# Patient Record
(demographics unavailable — no encounter records)

---

## 2024-10-10 NOTE — CONSULT LETTER
[Dear  ___] : Dear  [unfilled], [Courtesy Letter:] : I had the pleasure of seeing your patient, [unfilled], in my office today. [Please see my note below.] : Please see my note below. [Consult Closing:] : Thank you very much for allowing me to participate in the care of this patient.  If you have any questions, please do not hesitate to contact me. [Sincerely,] : Sincerely, [DrGuanakito  ___] : Dr. SCOTT

## 2024-10-16 NOTE — PHYSICAL EXAM
[Fully active, able to carry on all pre-disease performance without restriction] : Status 0 - Fully active, able to carry on all pre-disease performance without restriction [Normal] : affect appropriate [de-identified] : anicteric  [de-identified] : no LE edema

## 2024-10-16 NOTE — PHYSICAL EXAM
[Fully active, able to carry on all pre-disease performance without restriction] : Status 0 - Fully active, able to carry on all pre-disease performance without restriction [Normal] : affect appropriate [de-identified] : anicteric  [de-identified] : no LE edema

## 2024-10-16 NOTE — REVIEW OF SYSTEMS
[Fatigue] : fatigue [Joint Pain] : joint pain [Hot Flashes] : hot flashes [Fever] : no fever [Chills] : no chills [Night Sweats] : no night sweats [Chest Pain] : no chest pain [Palpitations] : no palpitations [Lower Ext Edema] : no lower extremity edema [Shortness Of Breath] : no shortness of breath [Cough] : no cough [Abdominal Pain] : no abdominal pain [Vomiting] : no vomiting [Constipation] : no constipation [Dysuria] : no dysuria [Incontinence] : no incontinence [Joint Stiffness] : no joint stiffness [Muscle Pain] : no muscle pain [Muscle Weakness] : no muscle weakness [Dizziness] : no dizziness [Easy Bleeding] : no tendency for easy bleeding [Easy Bruising] : no tendency for easy bruising [de-identified] : occasional

## 2024-10-16 NOTE — ASSESSMENT
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 He started abiraterone + prednisone in June 2020, dose was reduced to 500mg d/t transaminitis. Abiraterone discontinued late December 2023 for disease progression. Xtandi started Jan 2024.   mCRPC: - PSA prior to start of Xtandi was 3.6 on 1/2/24, 5.17 on 2/14/24, 5.6 on 3/14/24, 6.8 on 4/16/24, 9.68 on 5/15/24, 8.13 on 6/12/24, 9.39 on 7/11/24, 10.8 on 8/14/24, 12.3 on 9/17/24.  - Repeat PSA today is pending. CT scans at end of May and bone scan in late June were stable. To consider repeat scans if PSA continues to rise.  - 5/30/24 CT chest/abd/pelvis shows stable disease, appearance of bone is grossly unchanged. 6/25/24 bone scan shows significant improve in skeletal lesions when compared to the prior Dec 2023 scan.  - Continue Xtandi 80mg daily (dose reduced d/t poor appetite and weight loss), tolerating better at a decreased dose.  - Feb 2024 Foundation Liquid CDx was negative for any actionable mutations, MSI high not detected, TMB 8 muts/Mb, ctDNA <1%.  - Continue Eligard q6mo inj with urology, given 6/10/24, next scheduled for 12/12/24.   Bones: - He is off Ca + D supplementation d/t hypercalcemia.  - Monthly Xgeva inj started 5/15/24, given today   Diarrhea: - Occasional diarrhea is improved, only 2 episodes over the past month, monitor closely.  - Continue PRN Imodium per package insert. Instructed to contact the office if diarrhea is more persistent.   CKD: - Baseline Cr has been 1.52 - 1.87 over the past year - 8/14/24 Cr = 1.5 - 9/17/24 Cr = 1.5 - Repeat CMP today is pending.  - Continue follow up with nephrology.  Instructed to contact our office with any new/worsening symptoms. Pt and family member educated regarding plan of care, all questions/concerns addressed to the best of my abilities and their apparent satisfaction. F/u in 1 month for provider visit + Xgeva inj, transfer care to Dr. Moody as Dr. Basurto is leaving [facility].  [AdvancecareDate] : 1/2/24 [FreeTextEntry2] : Health care proxy form provided to pt 12/5/23, he still has at home. Instructed once again to bring the completed form to his next office visit.

## 2024-10-16 NOTE — HISTORY OF PRESENT ILLNESS
[Disease: _____________________] : Disease: [unfilled] [T: ___] : T[unfilled] [N: ___] : N[unfilled] [M: ___] : M[unfilled] [de-identified] : This 66-year-old male was first seen in consultation on July 20, 2016. In August of 2013 his PSA was 4.3. In September 30, 2013 it was 4.9. He underwent a biopsy in March 5, 2014 revealing Ade 7 (3+4) disease. He underwent external beam radiation therapy for a total dose of 8100 cGy in 45 fractions from May 25 and total July 31 of 2014. He subsequently experienced PSA failure and brachytherapy was considered. A CT scan was performed as part of that evaluation revealing a filling defect in the left ureter. In March 2016, he presented to the emergency room with a creatinine of 13.7 and potassium of 8.9. Bilateral nephrostomy tubes were placed. He was found to have GI bleeding and a colonoscopy revealed evidence of radiation proctitis. He has been on Lupron. 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 His initial staging was stage II, [id number].    The original pathology was reviewed prior to radiation. The left base revealed adenocarcinoma the prostate, Ade score 7 (3+4) involving 2 out of 2 cores, 40% of each core, with perineural invasion. The left mid zone revealed adenocarcinoma the prostate, Ade score 7 and (4+3) involving 50% of each of 2 cores, with perineural invasion noted. The left apex had 3 of 3 cores involved. One core was Ade 7 (3+4) involving 100% of the core. The other 2 cores had a Ade 6 (3+3) in less than 5% of 2 cores. Perineural invasion was identified as well.  Posttreatment biopsy revealed the presence of adenocarcinoma within the prostate gland. The PSA was 11 at the time of the biopsy.  Feels well at initial consultation. Started Lupron in January 2016 before the renal failure/obstruction. No pains. Fatigue at times. Appetite good, stable weight. Lost weight with illness in March, typical weight prior was 210, left hospital at 170. Weight stable. He was transfused multiple times. Some urine through penis recently. Has bilateral percutaneous nephrostomies. No blood, dysuria, no incontinence. Nocturia x 2-3 at this time. Right sided nephrostomy still has output, small amount in left bag. Due for change of tubes in September. Had radiation proctitis and has laser treatment. Sees GI in follow-up next month.   10/18/16...Had a colonoscopy, showed radiation colitis. No further rectal blood noted. Urine flow mostly from PCNs, some from penis. No dysuria, some  hematuria noted at times. Appetite is normal. No weight loss. No fatigue. Hot flushes occur daily. .  4/18/17...last scans 9/26.  Saw PCP and had PSA done, shows slight increase over manjinder. he was given ferrous sulfate to take for anemia. No pains. Urine flow is mostly thru PCNs. Still has occasional blood in the urine, noted in the bag. Has less volume form the left side.  Hot flushes occur a few a day, 15-20 minutes, occ sweats. Appetite is good, no weight loss. Some fatigue noted. No edema. Occ blood with stools, saw Dr Grossman in September, due for follow up. has not had major bleeding recently.   8/1/17...Surya states that he is feeling good, his urine flow is strong, slight increase, no pains anywhere, he gets hot flashes a "few  day, they are coming." He has some minor fatigue, He has percutaneous nephrostomies, He follows with Dr. Dick, no obvious hematuria, no breast tenderness.  11/14/17...Received Lupron from Dr Dick a few weeks ago. PSA was 0.02 on 8/1/17. He gets hot flashes a couple times a day, He has b/l nephrostomies, is able to pass urine through his penis. He denies hematuria, dysuria He states appetite is good. He gets some fatigue, He denies any pains.  4/17/18...Received Lupron from Dr Dick Jan 23, 2018. Not seen since November 17, due to illness. 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 Feels well. No pains noted. Some fatigue. Appetite is good. Reports that he has diarrheal for 2-3 months, not daily. He had loose stools this AM, took Imodium. Diarrhea occurs 1-2 days a week, the n stops. Some flatus, no cramps. No blood in the stool. Urine flow is via nephrostomy, minor amount thru penis, no dysuria, no incontinence. No blood in urine. Hot flushes most days.    Minor fatigue. Appetite normal.   7/17/18...On CAB. Feels "okay". No pains. Has bilateral nephrostomy tubes, due for change in August. No recent infections, occasionally passes urine via penis, no blood, no dysuria. Still has hot flushes, daily, more in the winter. Some fatigue, no physical impairment. Appetite is good. Weight stable. Diarrhea persists, on and off, not daily. Watery at times. takes Imodium on occasion. Occ small amount of blood with hard stools, secondary to radiation proctitis.   10/23/18...Feels "okay". No pains. Urine flow is minimal, most comes out of the nephrostomy tubes, nocturia -none. No blood, no dysuria. No incontinence. Hot flushes occur, day and night. Appetite is good, no weight. No dyspepsia, no nausea, no vomiting. Diarrheal stools occur episodically, every 2-3 weeks. No blood in the stool. Usually 1-2 BMs per day. Occasionally notes blood on toiler paper with hard stool. has RT proctitis.   2/5/19...Feels well, no pains. has hot flushes frequently, daily. Has some sweats with them at night. Appetite is good, no weight loss. Urine flow is minimal thru penis, nocturia does not occur. Most urine form PCNs, last catheter change January 2019, by IR at [facility]. Occ minor blood in the urine, when the catheter is loose. No back pains, no bone pains. Occ fatigue.   5/7/19...Had a calcium of 11.1 last visit. Called him and told to stop calcium and vit D. Repeat 3 weeks later was normal. Continues on Lupron and Casodex. Feels well. Hot flushes every day. He has some fatigue, unchanged. Appetite is good, no weight loss. Urine is minimal from penis, has bilateral PCNs. No bone pains. No edema.   8/8/19...Feels "all right", as a URTI. No pains noted. Hot flushes multiple time a day. Fatigue is present, mild. Urine flow is 'good', PCN bilateral, most flow form the right. Some flow thru penis, voids 1-2 times a day. NO blood, no dysuria. Due for PCN change next week. Appetite is good, no weight change.  10/29/19...Local failure prostate cancer, on CAB. Feels well. No pains., Urine flow is good. Has bilateral PCNs. Most of urine goes to PCNs. Occ small amount of blood in the bag. Voids 2 times a day. Hot flushes, daily, with sweats on occasion. has fatigue at times. Appetite is good, no weight loss. NO leg edema. No bleeding from the RT proctitis recently  1/28/20...Stopped bicalutamide after last visit. Last seen 3 months ago. PSA on 1/7 was 0.27, thus continued to rise somewhat. NO bone pains. Hot flushes daily, occ sweats. Appetite is good, no weight loss. No fatigue of note. Urine flow is good, but most flow is into the PCNs. Last change of PCNs was December after he had some leaking on one side. No hematuria, no dysuria. No incontinence.   5/6/20...Verbal permission granted for telephone services by patient, Surya Cervantes, on 5/6/20 at 1:35 PM.   Fells well. No pains noted. Nephrostomy tubes in place, replaced last week. Appetite is good, no weight loss. Hot flushes daily. He has chronic fatigue complaints. No leg edema noted. Urine through penis is minimal, only when tubes become clogged. No blood in the urine. No nausea, no vomiting. Has diarrhea at times, once every 2-3 weeks. No back pains noted. No fevers, no chills., No cough, no sputum.   6/17/20...Verbal permission for telephone contact was granted by the patient, Surya Cervantes, on June 17, at 4 PM. Feels "all right". NO pains. Hot flushes, daily, occ minor sweats. Fatigue is present, rated mild, occasional naps. Appetite is good, no weight loss. No edema. Urine flow is minimal from penis, has bilateral PCNs. No blood. No incontinence. No recent infections. No cough NO CHEN. No F/C. Helps in care of his mother, watches TV, not very active. Jeana from [employer].   7/29/20...Verbal permission for telephone contact was granted by the patient, Surya Cervantes, on July 29, 2020 at 3:10 PM.  Started tawanda/pred on June 16, 2020. Feels  "pretty good". He was having diarrhea prior and he no longer had diarrhea.   No pains at this time. No edema. No headaches. Checks BP on occasion.  BPs have been "normal", he will check often. Urine flow "about the same",. Most urine comes from PCNs. No nocturia, no  hematuria, no dysuria. Appetite is normal, thinks he may have lost a few pounds. No cough, No CHEN. NO F/C. Hot flushes occur daily. No change. Mild fatigue.  8/26/20...On abiraterone and prednisone since June, PSA dropped significantly. taking meds correctly. Feels 'all right", no pains. Fatigue is present, as before, "not terrible", takes naps. Slightly more fatigue than before. Appetite is good, but lost weight. eats with his mother, she eats less and so does he. No nausea, no vomiting. No headaches, no edema of note. Most of urine comes from PCNs, Urine from penis is minimal, does not get up at night. No blood, no dysuria. No incontinence. No blood in the PCN bags.  Has leg cramps daily, particularly in the AM. Dr Angulo administers Lupron, due for Rx on 9/1/20.   9/22/20...He is seen in the office today in follow-up. He's been on abiraterone and prednisone since June. He had elevated transaminases on September 3 and his abiraterone was subsequently held. He will have repeat blood work done today after today's visit. He states that he feels "all right." His appetite is stated to be "all right." His weight has been stable. He denies dyspepsia, nausea or vomiting. He denies constipation or diarrheal stools. There is no cough or shortness of breath. He denies skin rashes or pruritus. There are no complaints of pains at this time. Fatigue is present but it does not impair his daily activities. He takes occasional naps during the day. He denies arthralgias or myalgias. There are no headaches or dizziness. He has bilateral percutaneous nephrostomy tubes draining yellow urine. There is no blood in the percutaneous nephrostomy bags. Most of the urine comes from the percutaneous nephrostomy tubes with minimal amount of urine coming out through the penis. There is no nocturia. There is no hematuria or dysuria. There is no incontinence. There is no edema in the extremities. He has occasional hot flashes but felt it has improved these past few days. He denies fevers or chills. He infrequently checks his blood pressure at home. He would get blood pressure readings ranging from 150s-160s systolic over 80s-90s diastolic. He remains independent in his activities of daily living.   10/27/20...Verbal permission for telephone services granted by the patient,  Surya Cervantes on October 27, 2020 at 3:48 PM.  Stared abiraterone in June. Feels "pretty good". NO increased edema. No headaches. Hot flushes are less. Appetite is good, no weight loss. Has PCNs, has urine form the penis at times. NO blood in the urine. No dysuria. No nocturia. No incontinence. No bone pains noted. Fatigue  RTs, takes a nap at times. Cares for his elderly mother at home.   11/25/20...Verbal permission for telephone services granted by the patient,  Surya Cervantes on November 25, 2020 at 1:50 PM On 1/2 dose tawanda due to transaminases. No pains, feels "all right". Some fatigue. Urine flow form penis in minimal, has bilateral PCNs. Occasional small amount of blood in the right PCN tube. Appetite is good, no weight loss. No cough, no CHEN. Hot flushes daily. No headaches, no edema. BP monitored at home, 151/89, pulse 95.   12/23/20...Verbal permission for telephone services granted by the patient,  Surya Cervantes on December 23, 2020 at  1:32 PM.  Feels well. No pains noted. Urine flow RTS. Has PCNs, minimal urine via penis on occasion. No blood in urine or tubes of note. Appetite is good, no weight loss. NO edema of the legs. NO headaches, no dizziness. No cough, no CHEN. No F/C. Hot flushes occur daily, less often. Due Lupron next month from Dr Angulo. Mild fatigue, take a nap. Independent in ADLs, cares for his mother. BP at home 138/90, pulse 71. Left arm 147/92  1/28/21...Verbal permission for telephone services granted by the patient,  Surya Cervantes on January 28, 2021 at 315 PM.  Surya reports that he has no pains.  There is no significant urine within his blood.  In the interim, he had to have the left percutaneous nephrostomy tube changed as there was pus present.  They had some difficulties apparently.  The tube was clogged and he was unable to change it over a guidewire.  He was not treated with any antibiotics.  He does have some urine from the penis at times.  There is no fevers or chills.  His appetite is good and there is no weight loss.  He reports that his weight is 190 pounds today.  He says his blood pressure was 146/92 in the left arm and 145/85 on the right arm.  The pulse rate was 75.  He reports no edema or headaches.  There are some hot flushes.  2/25/21...Verbal permission for telephone services granted by the patient,  Surya Cervantes on 2/25/21 at 3:10 PM Feels "all right". NO pains. He was having issues with PCNs clogged, had to have removal and reinsertion. It was the right side this week, done on Tuesday. Told to flush it daily rather than every 3 days. Appetite is good, no weight loss, no edema. No cough, no CHEN. BP is monitored, right side 139/84 today, left 134/82, pulse 79. No headaches, no significant fatigue, does nap on occasion. NO leg weakness. No N/V/D/C. Minimal urine from penis. There was more flow when the dysfunction occurred. NO blood in the urine, no dysuria.   4/7/21...  10/26/21....Last evaluation was 4/7/21, by telephone. Completed antibiotics. Was in rehab for 6 weeks. Strength is good, no pains. Appetite is good, gained some weight. urine flow is good, no incontinence, very little urine from penis, mostly from the tubes. No headaches, no dizziness, no balance issues, No falls. Hot flushes occur. Lives with mother, helps her. No edema at this time.   From neurosurgery, this summary of events....70 yo Male w/ PMHx of HTN, prostate CA (on chemo, s/p radiation), b/l nephrostomy tubes, presented  to [facility] ED on 6/14/21 with weakness and confusion. In ED foulurine in b/l nephrostomy bags noted, tachycardic, and hypotensive. s/p IVF and levo drip for septic shock, developed fluid overload with pitting edema and decreased  EF. Imaging of the spine concerning for discitis and osteomyelitis c-spine with T1 inflammation/signal changes.   Hospital coursed remarkable for BC + Klebsiella and strep anguinous. Started on vanco/zosyn 6/14. As per ID switched to ceftriaxone 2 g on 6/17. Hospitalization c/b ASHLEY on CKD with right hydronephrosis. IR consulted- no intervention, both nephrostomy tubes flushing adequately. Nephrology  consulted, started on stress dose steroids/hydrocortisone 50 mg q6 and Midodrine. CTAP showed Sludge in gallbladder.  Patient  underwent C6-7 corpectomy and posterior fusion C4-T2 on 6/29/21. Hospital course c/b tachycardia, fever with low O2 sats. Chest CT negative for  PE, lower extremity Dopplers negative. s/p PICC, discharged to inpatient rehab on 7/12/21 and then was discharged home after completion of rehab.   Last Eligard was August with Dr Angulo, 30 mg dose.   11/30/21...on tawanda/pred since June 2020. Feels  "all right". Has hot flushes and fatigue. Does not prevent activities. He falls asleep easily if he sits in a chair. Independent in ADLs, cares for his mother. No infections recently, No F/C. No recent adventures with with his PCNs. No blood in urine, occ urine via penis. Appetite is good, gained 2.2 kilos. Edema decreased. No chest pain/pressure. No cough/ CHEN. No headaches, no dizziness. No N/V/D/C.   1/11/22....telehealth today. Feels  "all right". No pains of note. t flushes daily. Has some fatigue as well. No edema noted. NO cough/CEHN No chest pain/pressure. No F/C. Appetite is good, no weight loss. Weighs 176. Checks BP at home, 142/98 RP 72 today, L arm, 142/91. Takes terazosin at night. Sees PCP in March. Needs to call PCP for tighter BP control. NO headaches, no dizziness. No F/C. No hematuria, has bilateral PCNS, has some small amount of urine from the penis. Had change of PCNs last week. Independent in ADLs.   2/8/22 - telehealth visit today. Continues on abiraterone 500mg daily + prednisone. Reports his BP today was 143/94 with HR of 74. Overall feeling "alright". Moderate fatigue, takes naps during the day. Occasional hot flashes. Appetite is good, weight today is 180lbs, he attributes recent weight gain to eating better after being discharged from the hospital. Mild ptosis of left eyelid comes and goes and has reportedly been stable for his "whole life". Bilat percutaneous nephrostomy tubes in place, no hematuria. Ongoing mild BLE edema is reportedly stable. Ongoing intermittent arthritis pains of left knee and right shoulder are stable. Denies fever, chills, headache, dizziness, balance issues, eye pain/problems, mucositis/odynophagia, chest pain, palpitations, SOB, cough, nausea/vomiting, diarrhea/constipation, abdominal pain, hematuria, rash/pruritus, neuropathy, bleeding, weakness, anxiety/depression.  3/17/22...tawanda/pred started mid June 2020. PSA was 11.6 at start. Feels "all right". No pains. No major issues with the PCNs, having a change done next week. Appetite is good, no weight loss. No cough, No CHEN. Some leg edema. No chest pain/pressure. Urine from penis is relatively little. No dysuria, no blood. has calcium oxalate excretion, which causes his tubes to be clogged. Notes some bruising. NO headaches, no dizziness, no balance issues. No F/C. No N/V/C, some minor diarrheal stools at times. Takes Imodium as as needed. Fatigue is present at times, has hot flushes occur multiple times a day.   4/20/22 - continues on abiraterone (500mg daily) + prednisone since June 2020 for mCRPC. Overall feeling "alright", ongoing mild fatigue is stable. Intermittent hot flashes are tolerable. Mild night sweats during the summer when it gets warmer. Ongoing mildly productive cough which he's had for "a long time". Occasional diarrhea that doesn't last more than a day. Bilateral percutaneous nephrostomy tubes in place, passing minimal urine from his penis. Occasional hematuria especially from right nephrostomy bag, clear today. Ongoing mild BLE edema. Ongoing chronic left knee swelling and pain 2/2 arthritis. Ongoing right shoulder pain 2/2 arthritis as well. Denies fever, chills, headache, dizziness, balance issues, eye pain/problems, mucositis/odynophagia, chest pain, palpitations, SOB, nausea/vomiting, constipation, abdominal pain, rash/pruritus, bleeding, muscle pain/weakness  5/24/22 - continues on abiraterone (500mg daily) + prednisone since June 2020 for mCRPC. BP today is 164/95, asymptomatic. BP at home has been 150s/80-90s. On occasion the DBP has been >100. Overall feeling "alright", ongoing fatigue is stable. Ongoing intermittent hot flashes. Appetite is good. Vision is changing in his left eye, he has an appt with [employer] this Thurs. Bilateral nephrostomy tubes in place, occasional hematuria at times resolves after 2-3 days. Ongoing trace edema of BLEs. Intermittent pruritus around nephrostomy tube dressings. Denies fever, chills, night sweats, headache, dizziness, balance issues, mucositis/odynophagia, chest pain, palpitations, SOB, cough, nausea/vomiting, diarrhea/constipation, abdominal pain, dysuria, incontinence,  rash, neuropathy, bleeding, muscle or joint pain/weakness.   6/21/22 - continues on abiraterone, half dose + prednisone since June 2020 for mCRPC. PSA 11.6 at start of treatment in June 2020, manjinder PSA at o.o5 August 2021, slow rise, recent PSA in May 2022 was 0.17.  feels  "all right". No pains noted. Has bilateral PCNs, changed last week. No infections, no F/C. Some fatigue, always, no worse. Appetite is good, no weight loss. NO headaches, no dizziness, no balance issues. Some edema noted, as before. No chest pain/pressure. NO bone pains. No N/V/D/C. Small amounts of urine via the penis. Hot flushes daily, multiple times. No cough, no CHEN.   7/28/22 - continues on abiraterone 500mg + prednisone since June 2020 for mCRPC. Overall feeling "alright", ongoing mild fatigue for which he occasionally takes a nap once a day. Ongoing hot flashes are tolerable. Appetite is good. Occasional cough. Bilateral percutaneous nephrostomy tubes in place passing most of the urine through the bags but still passing minimal urine via penis, no nocturia. Trace edema of BLEs. Notes intermittent cramping of hands which may be arthritis as it improves with movement. Has ongoing intermittent right buttock pain radiating down to the knee, present for the past year, pain is worse with sitting for a long time, improves with changing positions, max pain is 3/10 not requiring medication. Denies fever, chills, night sweats, headache, dizziness, balance issues, eye pain/problems, mucositis/odynophagia, chest pain, palpitations, SOB, nausea/vomiting, diarrhea/constipation, abdominal pain, dysuria, hematuria, incontinence, rash/pruritus, bleeding, weakness.   10/11/22 - continues on abiraterone 500mg daily + prednisone since June 2020 for mCRPC. Neurosurgical notes reviewed...."S/P cervical spinal fusion, 70 year old male 15 months pos op C6-7 corpectomy and posterior fusion C4-T2 on 6/29/21 with Dr. Tiffanie Martinez". Now seeing Dr Resendiz. Overall, feels "all right". No pains noted. urine flow is "fine", no incontinence, most urine still out PCNs, some from the penis. No blood, no dysuria. Hot flushes daily.  Some fatigue, RTS. Appetite is good, no weight loss. No headaches, dizziness, no balance issues, No paresthesias. Occ cough, sputum is white. No CHEN. Tubes to be changed next week. No N/V/D/C.  Some edema , improved. No chest pain/pressure  11/9/22 - continues on abiraterone 500mg daily + prednisone since June 2020 for mCRPC which he is tolerating well. Pt reports feeling generally well. No new complaints. Bilateral nephrostomy tubes in place, denies hematuria . Ongoing trace edema of BLEs. Intermittent pruritus around nephrostomy tube dressings. Denies fever, chills, night sweats, headache, dizziness, balance issues, mucositis/odynophagia, chest pain, palpitations, SOB, cough, nausea/vomiting, diarrhea/constipation, abdominal pain, dysuria, incontinence, rash, neuropathy, bleeding, muscle or joint pain/weakness. Appetite reported as good. No changes in medications  12/7/22 - continues on abiraterone 500mg daily + prednisone since June 2020 for mCRPC.  gets his Eligard 45 tomorrow form Dr Angulo. feels "all right". No pains noted except for arthrtic complaints. Appetite is okay< dropped a few pounds. usual edema, no change. No headaches, NO dizziness. Most of the urine comes from PCNs, changed every 6 weeks due to prior infections. NO fevers, no chills. Hot flushes occur, 1-2 times a day. Notes a bit more urine thru penis lately. Occ cough, no CHEN. No chest pain/pressure/palpitations. NO N/V/D/C. Mild fatigue. Independent in ADLs.   1/4/23 - continues on abiraterone 500mg daily + prednisone since June 2020 for mCRPC. Overall feeling "alright", ongoing fatigue is stable. Ongoing hot flashes are tolerable. Appetite is "ok". Ongoing occasional productive cough is stable. Bilateral perc nephrostomy tubes in place, continues to pass some urine through the penis, no nocturia. Denies fever, chills, night sweats,headache, dizziness, balance issues, eye pain/problems, mucositis/odynophagia, chest pain, palpitations, SOB, nausea/vomiting, diarrhea/constipation, abdominal pain, dysuria, hematuria, incontinence, LE edema, rash/pruritus, bleeding, muscle or joint pain/weakness.   2/13/23..... continues on abiraterone 500mg daily + prednisone since June 2020 for mCRPC. treated with cephalexin and developed rash, red skin, pruritus. Chart labeled as allergy. Hospitalization was brief. sudden onset of symptoms, Hd diarrheal stools as well. Feels  "pretty good", except for residual pruritus. No pains noted.  Has tube change set for next week. Appetite has retuned. No N/V. Diarrhea resolved, NO taste alteration. No headaches, no dizziness. had some edema. resolved. No chest pain/pressure/palpations.  has his usual fatigue and hot flushes. Cough, chronic, with clear sputum. No CHEN/SOB. No fevers of chills since discharge  Hospital Course:  Discharge Date	01-Feb-2023  Admission Date	29-Jan-2023 17:52  Reason for Admission	Syncope  Hospital Course	  69 yo M w/ HTN, gout, prostate cancer s/p b/l nephrostomy tubes, on abiraterone, admitted w/ sepsis 2/2 UTI, improved on antibiotics.  Sepsis secondary to UTI.  was febrile, tachycardic in the ED, met criteria for sepsis on admission  - U/A w/ +nitrite, large LE, prior cultures with klebsiella and E. coli  - current Ucx >3 organisms suggestive of collection contamination  - received IV zosyn (1/29/23-2/1/23),  Bcx negative, discharge on oral cephalexin to complete 10 days for complicated UTI (end date 2/7/23)  - sepsis resolved (currently afebrile, without leukocytosis, tachycardia resolved)  - IR consulted nephrostomy tubes in position and draining well, no indication for exchange at this time.   Gastroenteritis.   had reported nausea/vomiting/diarrhea after eating fried rice w/ beef  - s/p IV fluids, now resolved.   Syncope.   reports syncopal episode likely 2/2 dehydration from gastroenteritis  - s/p IV fluids, EKG sinus rhythm, troponin indeterminate but no increased  - reports symptoms resolved, ambulating around unit independently (advised call don't fall).   3/21/23..... continues on abiraterone 500mg daily + prednisone since June 2020 for mCRPC. treated with cephalexin and developed rash, red skin, pruritus. Chart labeled as allergy. Overall, he feels "all right". No pains of note. Appetite is good, no weight loss. No edema. No chest pain/pressure/palpitations. some cough, no CHEN. Hot flushes daily. Fatigue is present.  Does yoga, runs in place. No HA, no dizziness, no falls. Independent in ADLs. no fevers, no chills. Minimal urine from penis, no dysuria. PCNs are changed every 6 weeks, no blood in tubes. No N/V/D/C.   4/17/23.....  on abiraterone 500mg and prednisone since June 2020 for mCRPC. Prior transaminitis led to decrease in dose. "doing okay". Minor discomfort on left side near the PCN. Has change every 6 weeks at this time. Appetite is good, no weight loss. No N/V/D/C. Has some urine form the penis, noted if PCNs pinch. No blood in the urine, no hematuria. Hot flushes as before, about 3 times a day.  fatigue RTS. No cough, no CHEN, No fevers, no chills. No edema, no chest pains,pressure/palpitations  Exercises every AM.   5/16/23....continues on abiraterone 500mg and prednisone since June 2020 for mCRPC. Prior transaminitis led to decrease in dose. Eligard next month with Dr Angulo. Overall, feels "all right". No pains noted. Appetite is good, no weight loss. No N/V/D/C. Urine from penis on occasion, empties bladder before bed. No hematuria, no dysuria. No fevers, no chills. PCNs were changed last week. No headaches, no dizziness, no balance issues.  Exercises daily, walking and running. Occ cough, no CHEN. No chest pain/pressure/palpitations. Hot flushes remains. No fevers, no chills  6/12/23.... on abiraterone 500mg and prednisone since June 2020 for mCRPC. Prior transaminitis led to decrease in dose. Mara with Dr Angulo. Feels "well". NO pains, except for radicular pain in right thigh, present for about 6 weeks, constant. uses a topical, icy-hot. Does not awaken him, better when he is lying down, aggravated by ambulation. Appetite is normal. No weight loss. NO fevers, no chills. NO fatigue. No headaches, no dizziness, no paresthesias. No cane. No falls. No cough, no CHEN. NO N/V/D/C. Urine from PCNs. Some from the penis, no hematuria, no dysuria. No nocturia. Hot flushes daily. No edema. NO chest pain/pressure/palpitations. No back pains. Independent in ADLs  7/18/23 - continues on abiraterone + prednisone (500mg daily d/t transaminitis) for mCRPC since June 2020. Ongoing fatigue after periods activity, naps once a day. Ongoing hot flashes are tolerable. Appetite is good. Occasional cough. Bilateral perc nephrostomy tubes in place, scant blood if the tubing moves around but not persistent. Ongoing pain in right buttock radiating down posterior thigh/leg, no pain with lying down, pain is worse with sitting for a prolonged time and improves with walking, max pain is 8/10. Not taking any pain medication as the pain eases up when he gets up and walks around.   8/16/23 - continues on abiraterone + prednisone (500mg daily d/t transaminitis) for mCRPC since June 2020. Overall feeling "alright", ongoing fatigue is stable. Ongoing frequent hot flashes, tolerable. Appetite is "alright". Ongoing occasional cough. Perc nephrostomy tubes exchanged 2wks ago, occasional transient hematuria which he attributes to accidentally pulling on the tubes. Ongoing stable intermittent right buttock pain radiating down posterior thigh down to the calf, improves with movement and worse with sitting for a long time, max pain is 5/10. Using a topical cream called "[place] Dream" which has been helpful.   9/20/23 - continues on abiraterone + prednisone (500mg daily d/t transaminitis) for mCRPC since June 2020. last PCN change was last week. On a 6 week change protocol. Feels "not bad", chronic leg pains, form right buttock, down the leg. Passing urine via penis, very small amounts. No blood, no dysuria. No nocturia. No incontinence, voids about 2 times a day. Hot flushes, every day, no sweats. Fatigue is present, naps, feels refreshed afterwards. Does yoga, stretching, walking, lifts some weights, daily basis. Appetite is good, no weight Kenji Occ cough, no CHEN. No edema, no chest pain/pressure.   10/31/23 - continues on abiraterone + prednisone (500mg daily d/t transaminitis) for mCRPC since June 2020. Overall feeling "alright", ongoing fatigue is stable. Occasional hot flashes. Appetite is "alright". Perc nephrostomy tubes in place, passing minimal urine through his penis. Ongoing pain in right buttock that radiates down the posterior leg, worse in the morning when first waking up and worse with sitting for a prolonged period, improves/resolves with activity. Denies fever, chills, night sweats, headache, dizziness, balance issues, chest pain, palpitations, SOB, cough, nausea/vomiting, diarrhea/constipation, abdominal pain, dysuria, hematuria, incontinence, LE edema, bleeding.   12/5/23 - continues on abiraterone + prednisone (500mg daily d/t transaminitis) for mCRPC since June 2020. Overall feeling well, some fatigue but remains active and exercising regularly. Ongoing hot flashes are tolerable. Appetite is good. Bilateral percutaneous nephrostomy tubes in place, no hematuria. Ongoing right buttock pain that radiates down the posterior leg, worse in the morning and improves with activity, max pain is 4-5/10, declines referral to PM&R. Denies fever, chills, night sweats, headache, dizziness, balance issues, chest pain, palpitations, SOB, cough, nausea/vomiting, diarrhea/constipation, abdominal pain, LE edema, bleeding.  1/2/24 - on abiraterone + prednisone since June 2020, discontinued 12/26/23 for POD seen on 12/21/23 bone scan. Feeling well, no significant fatigue. Remains active and doing exercises daily. Occasional hot flashes are tolerable. Bilateral percutaneous nephrostomy tubes in place, no hematuria. Ongoing right buttock pain that radiates down the posterior leg, worse in the morning and improves with activity, max pain is 7-8/10, pain is stable. Denies fever, chills, night sweats, headache, dizziness, balance issues, chest pain, palpitations, SOB, cough, nausea/vomiting, diarrhea/constipation, abdominal pain, LE edema, bleeding.   2/14/24 - Xtandi started early Jan 2024 for mCRPC. Over the past week he's been very fatigued. Ongoing hot flashes. Appetite is decreased and not eating as much as he used to, weight is down 11lbs over the past 6wks. Stable cough since before start of Xtandi. Percutaneous nephrostomy tubes in place, no hematuria. Notes some pains in the hands, knees, and ankles which he attributes to arthritis. Ongoing pain in right buttock radiating down posterior leg, pain is worse in the AM when first waking up and improves with activity/walking, pain waxes and wanes, max pain is 7/10 but not taking any medication for this. Denies fever, chills, night sweats, headache, dizziness, balance issues, chest pain, palpitations, SOB, nausea/vomiting, diarrhea/constipation, abdominal pain, LE edema, bleeding.   3/14/24 - continues on Xtandi since early Jan 2024 for mCRPC. Overall feeling ok, fatigue is a little improved which he believes is because his BP hasn't been as low as it was in the past. BP at home has been 120's/70-80's in the mornings. Ongoing hot flashes are not as intense. Appetite is decreased, weight is down 7lbs over the past month, but overall down 18lbs over 2 months. Urine from bilateral percutaneous nephrostomy tubes is "good", no hematuria. Ongoing pain in right buttock down the posterior leg, pain is worse with sitting for a prolonged period, improves with getting up and moving around, not interfering with his sleep, not needing any medication for pain. Denies fever, chills, night sweats, headache, dizziness, balance issues, chest pain, palpitations, SOB, cough, nausea/vomiting, diarrhea/constipation, abdominal pain, LE edema, bleeding.  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 NO AAW benefit. Started tawanda/pred mid June 2020, discontinued 12/26/23 for disease progression. Xtandi started early Jan 2024, decreased to 80mg daily in May 2024 d/t weight loss.  [de-identified] : 4/16/24 - continues on Xtandi since early Jan 2024 for mCRPC. Excess lambda light chains on last blood work, no M-spike. Feels well. no Pains. Has to some fatigue, naps during the day. Appetite is "good", No N/V/D/C. No cough, no CHEN. NO chest pain/pressure. No headaches, no dizziness. No balance issues, minimal, walks with a cane. No falls. Urine  flos via tubes, no recent infections. NO blood in urine. Some minor urine amounts from penis. No nocturia, no dysuria. Hot flushes occur, less than before, 1-2 times a day. No edema noted. Independent in ADLs.  CDX done, no targets  5/15/24 - continues on Xtandi since early Jan 2024 for mCRPC. Feeling "alright". Ongoing fatigue, naps 1-2x/day. Occasional hot flashes. Decreased appetite, not eating as much as before, drinking 1-2 Ensure per day, weight is down 5lbs over the past month. Bilateral percutaneous nephrostomy tubes in place, no hematuria. Ongoing arthritic pains of the shoulders and hands at times. Denies fever, chills, night sweats, headache, dizziness, balance issues, chest pain, palpitations, SOB, cough, nausea/vomiting, diarrhea/constipation, abdominal pain, LE edema, bleeding.   6/12/24 - continues on Xtandi since early Jan 2024 for mCRPC, decreased to 80mg daily as of May 2024 for weight loss. Ongoing fatigue is stable. Ongoing hot flashes. Appetite is "a little better". Urine from nephrostomy tubes is clear yellow, no hematuria. Denies fever, chills, night sweats, headache, dizziness, balance issues, chest pain, palpitations, SOB, cough, nausea/vomiting, diarrhea/constipation, abdominal pain, LE edema, bleeding, muscle or joint pain/weakness.  7/11/24 - on Xtandi since early Jan 2024 for mCRPC, decreased to 80mg daily as of May 2024 for weight loss. Patient accompanied by brother, reports to be feeling well, appetite slowly improving, continues to have nephrostomy tubes in places, last changed 23 weeks ago draining clear yellow urine. Denies fever, chills, headaches, chest pain, sob, abdominal pain/back pain.   8/14/24 - on Xtandi since early Jan 2024 for mCRPC, decreased to 80mg daily as of May 2024 for weight loss. Overall feeling well, ongoing fatigue is stable. Ongoing hot flashes.  Appetite is stable, weight is up a couple lbs since last visit. Diarrhea yesterday "all day" with >6-7 episodes of soft/loose stool, he took PRN Imodium yesterday, no more stool today, unsure if it was r/t something he ate. Urine from nephrostomy tubes is "good", no hematuria. Denies fever, chills, night sweats, headache, dizziness, chest pain, palpitations, SOB, cough, nausea/vomiting, constipation, abdominal pain, LE edema, bleeding, muscle or joint pain/weakness.  9/17/24 - on Xtandi since early Jan 2024 for mCRPC, decreased to 80mg daily as of May 2024 for weight loss. Feeling "alright", stable fatigue. Ongoing hot flashes are tolerable. Appetite is stable. Having intermittent diarrhea approx every other week with approx 3 episodes when it occurs but resolves with PRN Imodium 1-2 tabs, having normal BMs in between, unsure if diarrhea is r/t eating spinach. Bilateral nephrostomy tubes in place. Denies fever, chills, night sweats, headache, dizziness, chest pain, palpitations, SOB, cough, nausea/vomiting, constipation, abdominal pain, hematuria, LE edema, bleeding, muscle or joint pain/weakness.  10/16/24 - on Xtandi since early Jan 2024 for mCRPC, decreased to 80mg daily as of May 2024 for weight loss. Ongoing fatigue is stable. Ongoing hot flashes are tolerable. Appetite is "alright". Occasional diarrhea is improved, only 2 episodes of diarrhea over the past month. Bilateral percutaneous nephrostomy tubes in place, still passes some urine through his penis. Ongoing intermittent arthritic pains of the shoulders and knees are stable since before cancer diagnosis. Denies fever, chills, night sweats, headache, dizziness, chest pain, palpitations, SOB, cough, nausea/vomiting, constipation, abdominal pain, dysuria, hematuria, incontinence, LE edema, bleeding.

## 2024-10-16 NOTE — HISTORY OF PRESENT ILLNESS
[Disease: _____________________] : Disease: [unfilled] [T: ___] : T[unfilled] [N: ___] : N[unfilled] [M: ___] : M[unfilled] [de-identified] : This 66-year-old male was first seen in consultation on July 20, 2016. In August of 2013 his PSA was 4.3. In September 30, 2013 it was 4.9. He underwent a biopsy in March 5, 2014 revealing Ade 7 (3+4) disease. He underwent external beam radiation therapy for a total dose of 8100 cGy in 45 fractions from May 25 and total July 31 of 2014. He subsequently experienced PSA failure and brachytherapy was considered. A CT scan was performed as part of that evaluation revealing a filling defect in the left ureter. In March 2016, he presented to the emergency room with a creatinine of 13.7 and potassium of 8.9. Bilateral nephrostomy tubes were placed. He was found to have GI bleeding and a colonoscopy revealed evidence of radiation proctitis. He has been on Lupron. 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 His initial staging was stage II, [id number].    The original pathology was reviewed prior to radiation. The left base revealed adenocarcinoma the prostate, Ade score 7 (3+4) involving 2 out of 2 cores, 40% of each core, with perineural invasion. The left mid zone revealed adenocarcinoma the prostate, Ade score 7 and (4+3) involving 50% of each of 2 cores, with perineural invasion noted. The left apex had 3 of 3 cores involved. One core was Ade 7 (3+4) involving 100% of the core. The other 2 cores had a Ade 6 (3+3) in less than 5% of 2 cores. Perineural invasion was identified as well.  Posttreatment biopsy revealed the presence of adenocarcinoma within the prostate gland. The PSA was 11 at the time of the biopsy.  Feels well at initial consultation. Started Lupron in January 2016 before the renal failure/obstruction. No pains. Fatigue at times. Appetite good, stable weight. Lost weight with illness in March, typical weight prior was 210, left hospital at 170. Weight stable. He was transfused multiple times. Some urine through penis recently. Has bilateral percutaneous nephrostomies. No blood, dysuria, no incontinence. Nocturia x 2-3 at this time. Right sided nephrostomy still has output, small amount in left bag. Due for change of tubes in September. Had radiation proctitis and has laser treatment. Sees GI in follow-up next month.   10/18/16...Had a colonoscopy, showed radiation colitis. No further rectal blood noted. Urine flow mostly from PCNs, some from penis. No dysuria, some  hematuria noted at times. Appetite is normal. No weight loss. No fatigue. Hot flushes occur daily. .  4/18/17...last scans 9/26.  Saw PCP and had PSA done, shows slight increase over manjinder. he was given ferrous sulfate to take for anemia. No pains. Urine flow is mostly thru PCNs. Still has occasional blood in the urine, noted in the bag. Has less volume form the left side.  Hot flushes occur a few a day, 15-20 minutes, occ sweats. Appetite is good, no weight loss. Some fatigue noted. No edema. Occ blood with stools, saw Dr Grossman in September, due for follow up. has not had major bleeding recently.   8/1/17...Surya states that he is feeling good, his urine flow is strong, slight increase, no pains anywhere, he gets hot flashes a "few  day, they are coming." He has some minor fatigue, He has percutaneous nephrostomies, He follows with Dr. Dick, no obvious hematuria, no breast tenderness.  11/14/17...Received Lupron from Dr Dick a few weeks ago. PSA was 0.02 on 8/1/17. He gets hot flashes a couple times a day, He has b/l nephrostomies, is able to pass urine through his penis. He denies hematuria, dysuria He states appetite is good. He gets some fatigue, He denies any pains.  4/17/18...Received Lupron from Dr Dick Jan 23, 2018. Not seen since November 17, due to illness. 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 Feels well. No pains noted. Some fatigue. Appetite is good. Reports that he has diarrheal for 2-3 months, not daily. He had loose stools this AM, took Imodium. Diarrhea occurs 1-2 days a week, the n stops. Some flatus, no cramps. No blood in the stool. Urine flow is via nephrostomy, minor amount thru penis, no dysuria, no incontinence. No blood in urine. Hot flushes most days.    Minor fatigue. Appetite normal.   7/17/18...On CAB. Feels "okay". No pains. Has bilateral nephrostomy tubes, due for change in August. No recent infections, occasionally passes urine via penis, no blood, no dysuria. Still has hot flushes, daily, more in the winter. Some fatigue, no physical impairment. Appetite is good. Weight stable. Diarrhea persists, on and off, not daily. Watery at times. takes Imodium on occasion. Occ small amount of blood with hard stools, secondary to radiation proctitis.   10/23/18...Feels "okay". No pains. Urine flow is minimal, most comes out of the nephrostomy tubes, nocturia -none. No blood, no dysuria. No incontinence. Hot flushes occur, day and night. Appetite is good, no weight. No dyspepsia, no nausea, no vomiting. Diarrheal stools occur episodically, every 2-3 weeks. No blood in the stool. Usually 1-2 BMs per day. Occasionally notes blood on toiler paper with hard stool. has RT proctitis.   2/5/19...Feels well, no pains. has hot flushes frequently, daily. Has some sweats with them at night. Appetite is good, no weight loss. Urine flow is minimal thru penis, nocturia does not occur. Most urine form PCNs, last catheter change January 2019, by IR at [facility]. Occ minor blood in the urine, when the catheter is loose. No back pains, no bone pains. Occ fatigue.   5/7/19...Had a calcium of 11.1 last visit. Called him and told to stop calcium and vit D. Repeat 3 weeks later was normal. Continues on Lupron and Casodex. Feels well. Hot flushes every day. He has some fatigue, unchanged. Appetite is good, no weight loss. Urine is minimal from penis, has bilateral PCNs. No bone pains. No edema.   8/8/19...Feels "all right", as a URTI. No pains noted. Hot flushes multiple time a day. Fatigue is present, mild. Urine flow is 'good', PCN bilateral, most flow form the right. Some flow thru penis, voids 1-2 times a day. NO blood, no dysuria. Due for PCN change next week. Appetite is good, no weight change.  10/29/19...Local failure prostate cancer, on CAB. Feels well. No pains., Urine flow is good. Has bilateral PCNs. Most of urine goes to PCNs. Occ small amount of blood in the bag. Voids 2 times a day. Hot flushes, daily, with sweats on occasion. has fatigue at times. Appetite is good, no weight loss. NO leg edema. No bleeding from the RT proctitis recently  1/28/20...Stopped bicalutamide after last visit. Last seen 3 months ago. PSA on 1/7 was 0.27, thus continued to rise somewhat. NO bone pains. Hot flushes daily, occ sweats. Appetite is good, no weight loss. No fatigue of note. Urine flow is good, but most flow is into the PCNs. Last change of PCNs was December after he had some leaking on one side. No hematuria, no dysuria. No incontinence.   5/6/20...Verbal permission granted for telephone services by patient, Surya Cervantes, on 5/6/20 at 1:35 PM.   Fells well. No pains noted. Nephrostomy tubes in place, replaced last week. Appetite is good, no weight loss. Hot flushes daily. He has chronic fatigue complaints. No leg edema noted. Urine through penis is minimal, only when tubes become clogged. No blood in the urine. No nausea, no vomiting. Has diarrhea at times, once every 2-3 weeks. No back pains noted. No fevers, no chills., No cough, no sputum.   6/17/20...Verbal permission for telephone contact was granted by the patient, Surya Cervantes, on June 17, at 4 PM. Feels "all right". NO pains. Hot flushes, daily, occ minor sweats. Fatigue is present, rated mild, occasional naps. Appetite is good, no weight loss. No edema. Urine flow is minimal from penis, has bilateral PCNs. No blood. No incontinence. No recent infections. No cough NO CHEN. No F/C. Helps in care of his mother, watches TV, not very active. Jeana from [employer].   7/29/20...Verbal permission for telephone contact was granted by the patient, Surya Cervantes, on July 29, 2020 at 3:10 PM.  Started tawanda/pred on June 16, 2020. Feels  "pretty good". He was having diarrhea prior and he no longer had diarrhea.   No pains at this time. No edema. No headaches. Checks BP on occasion.  BPs have been "normal", he will check often. Urine flow "about the same",. Most urine comes from PCNs. No nocturia, no  hematuria, no dysuria. Appetite is normal, thinks he may have lost a few pounds. No cough, No CHEN. NO F/C. Hot flushes occur daily. No change. Mild fatigue.  8/26/20...On abiraterone and prednisone since June, PSA dropped significantly. taking meds correctly. Feels 'all right", no pains. Fatigue is present, as before, "not terrible", takes naps. Slightly more fatigue than before. Appetite is good, but lost weight. eats with his mother, she eats less and so does he. No nausea, no vomiting. No headaches, no edema of note. Most of urine comes from PCNs, Urine from penis is minimal, does not get up at night. No blood, no dysuria. No incontinence. No blood in the PCN bags.  Has leg cramps daily, particularly in the AM. Dr Angulo administers Lupron, due for Rx on 9/1/20.   9/22/20...He is seen in the office today in follow-up. He's been on abiraterone and prednisone since June. He had elevated transaminases on September 3 and his abiraterone was subsequently held. He will have repeat blood work done today after today's visit. He states that he feels "all right." His appetite is stated to be "all right." His weight has been stable. He denies dyspepsia, nausea or vomiting. He denies constipation or diarrheal stools. There is no cough or shortness of breath. He denies skin rashes or pruritus. There are no complaints of pains at this time. Fatigue is present but it does not impair his daily activities. He takes occasional naps during the day. He denies arthralgias or myalgias. There are no headaches or dizziness. He has bilateral percutaneous nephrostomy tubes draining yellow urine. There is no blood in the percutaneous nephrostomy bags. Most of the urine comes from the percutaneous nephrostomy tubes with minimal amount of urine coming out through the penis. There is no nocturia. There is no hematuria or dysuria. There is no incontinence. There is no edema in the extremities. He has occasional hot flashes but felt it has improved these past few days. He denies fevers or chills. He infrequently checks his blood pressure at home. He would get blood pressure readings ranging from 150s-160s systolic over 80s-90s diastolic. He remains independent in his activities of daily living.   10/27/20...Verbal permission for telephone services granted by the patient,  Suyra Cervantes on October 27, 2020 at 3:48 PM.  Stared abiraterone in June. Feels "pretty good". NO increased edema. No headaches. Hot flushes are less. Appetite is good, no weight loss. Has PCNs, has urine form the penis at times. NO blood in the urine. No dysuria. No nocturia. No incontinence. No bone pains noted. Fatigue  RTs, takes a nap at times. Cares for his elderly mother at home.   11/25/20...Verbal permission for telephone services granted by the patient,  Surya Cervantes on November 25, 2020 at 1:50 PM On 1/2 dose tawanda due to transaminases. No pains, feels "all right". Some fatigue. Urine flow form penis in minimal, has bilateral PCNs. Occasional small amount of blood in the right PCN tube. Appetite is good, no weight loss. No cough, no CHEN. Hot flushes daily. No headaches, no edema. BP monitored at home, 151/89, pulse 95.   12/23/20...Verbal permission for telephone services granted by the patient,  Surya Cervantes on December 23, 2020 at  1:32 PM.  Feels well. No pains noted. Urine flow RTS. Has PCNs, minimal urine via penis on occasion. No blood in urine or tubes of note. Appetite is good, no weight loss. NO edema of the legs. NO headaches, no dizziness. No cough, no CHEN. No F/C. Hot flushes occur daily, less often. Due Lupron next month from Dr Angulo. Mild fatigue, take a nap. Independent in ADLs, cares for his mother. BP at home 138/90, pulse 71. Left arm 147/92  1/28/21...Verbal permission for telephone services granted by the patient,  Surya Cervantes on January 28, 2021 at 315 PM.  Surya reports that he has no pains.  There is no significant urine within his blood.  In the interim, he had to have the left percutaneous nephrostomy tube changed as there was pus present.  They had some difficulties apparently.  The tube was clogged and he was unable to change it over a guidewire.  He was not treated with any antibiotics.  He does have some urine from the penis at times.  There is no fevers or chills.  His appetite is good and there is no weight loss.  He reports that his weight is 190 pounds today.  He says his blood pressure was 146/92 in the left arm and 145/85 on the right arm.  The pulse rate was 75.  He reports no edema or headaches.  There are some hot flushes.  2/25/21...Verbal permission for telephone services granted by the patient,  Surya Cervantes on 2/25/21 at 3:10 PM Feels "all right". NO pains. He was having issues with PCNs clogged, had to have removal and reinsertion. It was the right side this week, done on Tuesday. Told to flush it daily rather than every 3 days. Appetite is good, no weight loss, no edema. No cough, no CHEN. BP is monitored, right side 139/84 today, left 134/82, pulse 79. No headaches, no significant fatigue, does nap on occasion. NO leg weakness. No N/V/D/C. Minimal urine from penis. There was more flow when the dysfunction occurred. NO blood in the urine, no dysuria.   4/7/21...  10/26/21....Last evaluation was 4/7/21, by telephone. Completed antibiotics. Was in rehab for 6 weeks. Strength is good, no pains. Appetite is good, gained some weight. urine flow is good, no incontinence, very little urine from penis, mostly from the tubes. No headaches, no dizziness, no balance issues, No falls. Hot flushes occur. Lives with mother, helps her. No edema at this time.   From neurosurgery, this summary of events....70 yo Male w/ PMHx of HTN, prostate CA (on chemo, s/p radiation), b/l nephrostomy tubes, presented  to [facility] ED on 6/14/21 with weakness and confusion. In ED foulurine in b/l nephrostomy bags noted, tachycardic, and hypotensive. s/p IVF and levo drip for septic shock, developed fluid overload with pitting edema and decreased  EF. Imaging of the spine concerning for discitis and osteomyelitis c-spine with T1 inflammation/signal changes.   Hospital coursed remarkable for BC + Klebsiella and strep anguinous. Started on vanco/zosyn 6/14. As per ID switched to ceftriaxone 2 g on 6/17. Hospitalization c/b ASHLEY on CKD with right hydronephrosis. IR consulted- no intervention, both nephrostomy tubes flushing adequately. Nephrology  consulted, started on stress dose steroids/hydrocortisone 50 mg q6 and Midodrine. CTAP showed Sludge in gallbladder.  Patient  underwent C6-7 corpectomy and posterior fusion C4-T2 on 6/29/21. Hospital course c/b tachycardia, fever with low O2 sats. Chest CT negative for  PE, lower extremity Dopplers negative. s/p PICC, discharged to inpatient rehab on 7/12/21 and then was discharged home after completion of rehab.   Last Eligard was August with Dr Angulo, 30 mg dose.   11/30/21...on tawanda/pred since June 2020. Feels  "all right". Has hot flushes and fatigue. Does not prevent activities. He falls asleep easily if he sits in a chair. Independent in ADLs, cares for his mother. No infections recently, No F/C. No recent adventures with with his PCNs. No blood in urine, occ urine via penis. Appetite is good, gained 2.2 kilos. Edema decreased. No chest pain/pressure. No cough/ CHEN. No headaches, no dizziness. No N/V/D/C.   1/11/22....telehealth today. Feels  "all right". No pains of note. t flushes daily. Has some fatigue as well. No edema noted. NO cough/CHEN No chest pain/pressure. No F/C. Appetite is good, no weight loss. Weighs 176. Checks BP at home, 142/98 RP 72 today, L arm, 142/91. Takes terazosin at night. Sees PCP in March. Needs to call PCP for tighter BP control. NO headaches, no dizziness. No F/C. No hematuria, has bilateral PCNS, has some small amount of urine from the penis. Had change of PCNs last week. Independent in ADLs.   2/8/22 - telehealth visit today. Continues on abiraterone 500mg daily + prednisone. Reports his BP today was 143/94 with HR of 74. Overall feeling "alright". Moderate fatigue, takes naps during the day. Occasional hot flashes. Appetite is good, weight today is 180lbs, he attributes recent weight gain to eating better after being discharged from the hospital. Mild ptosis of left eyelid comes and goes and has reportedly been stable for his "whole life". Bilat percutaneous nephrostomy tubes in place, no hematuria. Ongoing mild BLE edema is reportedly stable. Ongoing intermittent arthritis pains of left knee and right shoulder are stable. Denies fever, chills, headache, dizziness, balance issues, eye pain/problems, mucositis/odynophagia, chest pain, palpitations, SOB, cough, nausea/vomiting, diarrhea/constipation, abdominal pain, hematuria, rash/pruritus, neuropathy, bleeding, weakness, anxiety/depression.  3/17/22...tawanda/pred started mid June 2020. PSA was 11.6 at start. Feels "all right". No pains. No major issues with the PCNs, having a change done next week. Appetite is good, no weight loss. No cough, No CHEN. Some leg edema. No chest pain/pressure. Urine from penis is relatively little. No dysuria, no blood. has calcium oxalate excretion, which causes his tubes to be clogged. Notes some bruising. NO headaches, no dizziness, no balance issues. No F/C. No N/V/C, some minor diarrheal stools at times. Takes Imodium as as needed. Fatigue is present at times, has hot flushes occur multiple times a day.   4/20/22 - continues on abiraterone (500mg daily) + prednisone since June 2020 for mCRPC. Overall feeling "alright", ongoing mild fatigue is stable. Intermittent hot flashes are tolerable. Mild night sweats during the summer when it gets warmer. Ongoing mildly productive cough which he's had for "a long time". Occasional diarrhea that doesn't last more than a day. Bilateral percutaneous nephrostomy tubes in place, passing minimal urine from his penis. Occasional hematuria especially from right nephrostomy bag, clear today. Ongoing mild BLE edema. Ongoing chronic left knee swelling and pain 2/2 arthritis. Ongoing right shoulder pain 2/2 arthritis as well. Denies fever, chills, headache, dizziness, balance issues, eye pain/problems, mucositis/odynophagia, chest pain, palpitations, SOB, nausea/vomiting, constipation, abdominal pain, rash/pruritus, bleeding, muscle pain/weakness  5/24/22 - continues on abiraterone (500mg daily) + prednisone since June 2020 for mCRPC. BP today is 164/95, asymptomatic. BP at home has been 150s/80-90s. On occasion the DBP has been >100. Overall feeling "alright", ongoing fatigue is stable. Ongoing intermittent hot flashes. Appetite is good. Vision is changing in his left eye, he has an appt with [email or web address] this Thurs. Bilateral nephrostomy tubes in place, occasional hematuria at times resolves after 2-3 days. Ongoing trace edema of BLEs. Intermittent pruritus around nephrostomy tube dressings. Denies fever, chills, night sweats, headache, dizziness, balance issues, mucositis/odynophagia, chest pain, palpitations, SOB, cough, nausea/vomiting, diarrhea/constipation, abdominal pain, dysuria, incontinence,  rash, neuropathy, bleeding, muscle or joint pain/weakness.   6/21/22 - continues on abiraterone, half dose + prednisone since June 2020 for mCRPC. PSA 11.6 at start of treatment in June 2020, manjinder PSA at o.o5 August 2021, slow rise, recent PSA in May 2022 was 0.17.  feels  "all right". No pains noted. Has bilateral PCNs, changed last week. No infections, no F/C. Some fatigue, always, no worse. Appetite is good, no weight loss. NO headaches, no dizziness, no balance issues. Some edema noted, as before. No chest pain/pressure. NO bone pains. No N/V/D/C. Small amounts of urine via the penis. Hot flushes daily, multiple times. No cough, no CHEN.   7/28/22 - continues on abiraterone 500mg + prednisone since June 2020 for mCRPC. Overall feeling "alright", ongoing mild fatigue for which he occasionally takes a nap once a day. Ongoing hot flashes are tolerable. Appetite is good. Occasional cough. Bilateral percutaneous nephrostomy tubes in place passing most of the urine through the bags but still passing minimal urine via penis, no nocturia. Trace edema of BLEs. Notes intermittent cramping of hands which may be arthritis as it improves with movement. Has ongoing intermittent right buttock pain radiating down to the knee, present for the past year, pain is worse with sitting for a long time, improves with changing positions, max pain is 3/10 not requiring medication. Denies fever, chills, night sweats, headache, dizziness, balance issues, eye pain/problems, mucositis/odynophagia, chest pain, palpitations, SOB, nausea/vomiting, diarrhea/constipation, abdominal pain, dysuria, hematuria, incontinence, rash/pruritus, bleeding, weakness.   10/11/22 - continues on abiraterone 500mg daily + prednisone since June 2020 for mCRPC. Neurosurgical notes reviewed...."S/P cervical spinal fusion, 70 year old male 15 months pos op C6-7 corpectomy and posterior fusion C4-T2 on 6/29/21 with Dr. Tiffanie Martinez". Now seeing Dr Resendiz. Overall, feels "all right". No pains noted. urine flow is "fine", no incontinence, most urine still out PCNs, some from the penis. No blood, no dysuria. Hot flushes daily.  Some fatigue, RTS. Appetite is good, no weight loss. No headaches, dizziness, no balance issues, No paresthesias. Occ cough, sputum is white. No CHEN. Tubes to be changed next week. No N/V/D/C.  Some edema , improved. No chest pain/pressure  11/9/22 - continues on abiraterone 500mg daily + prednisone since June 2020 for mCRPC which he is tolerating well. Pt reports feeling generally well. No new complaints. Bilateral nephrostomy tubes in place, denies hematuria . Ongoing trace edema of BLEs. Intermittent pruritus around nephrostomy tube dressings. Denies fever, chills, night sweats, headache, dizziness, balance issues, mucositis/odynophagia, chest pain, palpitations, SOB, cough, nausea/vomiting, diarrhea/constipation, abdominal pain, dysuria, incontinence, rash, neuropathy, bleeding, muscle or joint pain/weakness. Appetite reported as good. No changes in medications  12/7/22 - continues on abiraterone 500mg daily + prednisone since June 2020 for mCRPC.  gets his Eligard 45 tomorrow form Dr Angulo. feels "all right". No pains noted except for arthrtic complaints. Appetite is okay< dropped a few pounds. usual edema, no change. No headaches, NO dizziness. Most of the urine comes from PCNs, changed every 6 weeks due to prior infections. NO fevers, no chills. Hot flushes occur, 1-2 times a day. Notes a bit more urine thru penis lately. Occ cough, no CHEN. No chest pain/pressure/palpitations. NO N/V/D/C. Mild fatigue. Independent in ADLs.   1/4/23 - continues on abiraterone 500mg daily + prednisone since June 2020 for mCRPC. Overall feeling "alright", ongoing fatigue is stable. Ongoing hot flashes are tolerable. Appetite is "ok". Ongoing occasional productive cough is stable. Bilateral perc nephrostomy tubes in place, continues to pass some urine through the penis, no nocturia. Denies fever, chills, night sweats,headache, dizziness, balance issues, eye pain/problems, mucositis/odynophagia, chest pain, palpitations, SOB, nausea/vomiting, diarrhea/constipation, abdominal pain, dysuria, hematuria, incontinence, LE edema, rash/pruritus, bleeding, muscle or joint pain/weakness.   2/13/23..... continues on abiraterone 500mg daily + prednisone since June 2020 for mCRPC. treated with cephalexin and developed rash, red skin, pruritus. Chart labeled as allergy. Hospitalization was brief. sudden onset of symptoms, Hd diarrheal stools as well. Feels  "pretty good", except for residual pruritus. No pains noted.  Has tube change set for next week. Appetite has retuned. No N/V. Diarrhea resolved, NO taste alteration. No headaches, no dizziness. had some edema. resolved. No chest pain/pressure/palpations.  has his usual fatigue and hot flushes. Cough, chronic, with clear sputum. No CHEN/SOB. No fevers of chills since discharge  Hospital Course:  Discharge Date	01-Feb-2023  Admission Date	29-Jan-2023 17:52  Reason for Admission	Syncope  Hospital Course	  69 yo M w/ HTN, gout, prostate cancer s/p b/l nephrostomy tubes, on abiraterone, admitted w/ sepsis 2/2 UTI, improved on antibiotics.  Sepsis secondary to UTI.  was febrile, tachycardic in the ED, met criteria for sepsis on admission  - U/A w/ +nitrite, large LE, prior cultures with klebsiella and E. coli  - current Ucx >3 organisms suggestive of collection contamination  - received IV zosyn (1/29/23-2/1/23),  Bcx negative, discharge on oral cephalexin to complete 10 days for complicated UTI (end date 2/7/23)  - sepsis resolved (currently afebrile, without leukocytosis, tachycardia resolved)  - IR consulted nephrostomy tubes in position and draining well, no indication for exchange at this time.   Gastroenteritis.   had reported nausea/vomiting/diarrhea after eating fried rice w/ beef  - s/p IV fluids, now resolved.   Syncope.   reports syncopal episode likely 2/2 dehydration from gastroenteritis  - s/p IV fluids, EKG sinus rhythm, troponin indeterminate but no increased  - reports symptoms resolved, ambulating around unit independently (advised call don't fall).   3/21/23..... continues on abiraterone 500mg daily + prednisone since June 2020 for mCRPC. treated with cephalexin and developed rash, red skin, pruritus. Chart labeled as allergy. Overall, he feels "all right". No pains of note. Appetite is good, no weight loss. No edema. No chest pain/pressure/palpitations. some cough, no CHEN. Hot flushes daily. Fatigue is present.  Does yoga, runs in place. No HA, no dizziness, no falls. Independent in ADLs. no fevers, no chills. Minimal urine from penis, no dysuria. PCNs are changed every 6 weeks, no blood in tubes. No N/V/D/C.   4/17/23.....  on abiraterone 500mg and prednisone since June 2020 for mCRPC. Prior transaminitis led to decrease in dose. "doing okay". Minor discomfort on left side near the PCN. Has change every 6 weeks at this time. Appetite is good, no weight loss. No N/V/D/C. Has some urine form the penis, noted if PCNs pinch. No blood in the urine, no hematuria. Hot flushes as before, about 3 times a day.  fatigue RTS. No cough, no CHEN, No fevers, no chills. No edema, no chest pains,pressure/palpitations  Exercises every AM.   5/16/23....continues on abiraterone 500mg and prednisone since June 2020 for mCRPC. Prior transaminitis led to decrease in dose. Eligard next month with Dr Angulo. Overall, feels "all right". No pains noted. Appetite is good, no weight loss. No N/V/D/C. Urine from penis on occasion, empties bladder before bed. No hematuria, no dysuria. No fevers, no chills. PCNs were changed last week. No headaches, no dizziness, no balance issues.  Exercises daily, walking and running. Occ cough, no CHEN. No chest pain/pressure/palpitations. Hot flushes remains. No fevers, no chills  6/12/23.... on abiraterone 500mg and prednisone since June 2020 for mCRPC. Prior transaminitis led to decrease in dose. Mara with Dr Angulo. Feels "well". NO pains, except for radicular pain in right thigh, present for about 6 weeks, constant. uses a topical, icy-hot. Does not awaken him, better when he is lying down, aggravated by ambulation. Appetite is normal. No weight loss. NO fevers, no chills. NO fatigue. No headaches, no dizziness, no paresthesias. No cane. No falls. No cough, no CHEN. NO N/V/D/C. Urine from PCNs. Some from the penis, no hematuria, no dysuria. No nocturia. Hot flushes daily. No edema. NO chest pain/pressure/palpitations. No back pains. Independent in ADLs  7/18/23 - continues on abiraterone + prednisone (500mg daily d/t transaminitis) for mCRPC since June 2020. Ongoing fatigue after periods activity, naps once a day. Ongoing hot flashes are tolerable. Appetite is good. Occasional cough. Bilateral perc nephrostomy tubes in place, scant blood if the tubing moves around but not persistent. Ongoing pain in right buttock radiating down posterior thigh/leg, no pain with lying down, pain is worse with sitting for a prolonged time and improves with walking, max pain is 8/10. Not taking any pain medication as the pain eases up when he gets up and walks around.   8/16/23 - continues on abiraterone + prednisone (500mg daily d/t transaminitis) for mCRPC since June 2020. Overall feeling "alright", ongoing fatigue is stable. Ongoing frequent hot flashes, tolerable. Appetite is "alright". Ongoing occasional cough. Perc nephrostomy tubes exchanged 2wks ago, occasional transient hematuria which he attributes to accidentally pulling on the tubes. Ongoing stable intermittent right buttock pain radiating down posterior thigh down to the calf, improves with movement and worse with sitting for a long time, max pain is 5/10. Using a topical cream called "Australian Dream" which has been helpful.   9/20/23 - continues on abiraterone + prednisone (500mg daily d/t transaminitis) for mCRPC since June 2020. last PCN change was last week. On a 6 week change protocol. Feels "not bad", chronic leg pains, form right buttock, down the leg. Passing urine via penis, very small amounts. No blood, no dysuria. No nocturia. No incontinence, voids about 2 times a day. Hot flushes, every day, no sweats. Fatigue is present, naps, feels refreshed afterwards. Does yoga, stretching, walking, lifts some weights, daily basis. Appetite is good, no weight Kenji Occ cough, no CHEN. No edema, no chest pain/pressure.   10/31/23 - continues on abiraterone + prednisone (500mg daily d/t transaminitis) for mCRPC since June 2020. Overall feeling "alright", ongoing fatigue is stable. Occasional hot flashes. Appetite is "alright". Perc nephrostomy tubes in place, passing minimal urine through his penis. Ongoing pain in right buttock that radiates down the posterior leg, worse in the morning when first waking up and worse with sitting for a prolonged period, improves/resolves with activity. Denies fever, chills, night sweats, headache, dizziness, balance issues, chest pain, palpitations, SOB, cough, nausea/vomiting, diarrhea/constipation, abdominal pain, dysuria, hematuria, incontinence, LE edema, bleeding.   12/5/23 - continues on abiraterone + prednisone (500mg daily d/t transaminitis) for mCRPC since June 2020. Overall feeling well, some fatigue but remains active and exercising regularly. Ongoing hot flashes are tolerable. Appetite is good. Bilateral percutaneous nephrostomy tubes in place, no hematuria. Ongoing right buttock pain that radiates down the posterior leg, worse in the morning and improves with activity, max pain is 4-5/10, declines referral to PM&R. Denies fever, chills, night sweats, headache, dizziness, balance issues, chest pain, palpitations, SOB, cough, nausea/vomiting, diarrhea/constipation, abdominal pain, LE edema, bleeding.  1/2/24 - on abiraterone + prednisone since June 2020, discontinued 12/26/23 for POD seen on 12/21/23 bone scan. Feeling well, no significant fatigue. Remains active and doing exercises daily. Occasional hot flashes are tolerable. Bilateral percutaneous nephrostomy tubes in place, no hematuria. Ongoing right buttock pain that radiates down the posterior leg, worse in the morning and improves with activity, max pain is 7-8/10, pain is stable. Denies fever, chills, night sweats, headache, dizziness, balance issues, chest pain, palpitations, SOB, cough, nausea/vomiting, diarrhea/constipation, abdominal pain, LE edema, bleeding.   2/14/24 - Xtandi started early Jan 2024 for mCRPC. Over the past week he's been very fatigued. Ongoing hot flashes. Appetite is decreased and not eating as much as he used to, weight is down 11lbs over the past 6wks. Stable cough since before start of Xtandi. Percutaneous nephrostomy tubes in place, no hematuria. Notes some pains in the hands, knees, and ankles which he attributes to arthritis. Ongoing pain in right buttock radiating down posterior leg, pain is worse in the AM when first waking up and improves with activity/walking, pain waxes and wanes, max pain is 7/10 but not taking any medication for this. Denies fever, chills, night sweats, headache, dizziness, balance issues, chest pain, palpitations, SOB, nausea/vomiting, diarrhea/constipation, abdominal pain, LE edema, bleeding.   3/14/24 - continues on Xtandi since early Jan 2024 for mCRPC. Overall feeling ok, fatigue is a little improved which he believes is because his BP hasn't been as low as it was in the past. BP at home has been 120's/70-80's in the mornings. Ongoing hot flashes are not as intense. Appetite is decreased, weight is down 7lbs over the past month, but overall down 18lbs over 2 months. Urine from bilateral percutaneous nephrostomy tubes is "good", no hematuria. Ongoing pain in right buttock down the posterior leg, pain is worse with sitting for a prolonged period, improves with getting up and moving around, not interfering with his sleep, not needing any medication for pain. Denies fever, chills, night sweats, headache, dizziness, balance issues, chest pain, palpitations, SOB, cough, nausea/vomiting, diarrhea/constipation, abdominal pain, LE edema, bleeding.  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 NO AAW benefit. Started tawanda/pred mid June 2020, discontinued 12/26/23 for disease progression. Xtandi started early Jan 2024, decreased to 80mg daily in May 2024 d/t weight loss.  [de-identified] : 4/16/24 - continues on Xtandi since early Jan 2024 for mCRPC. Excess lambda light chains on last blood work, no M-spike. Feels well. no Pains. Has to some fatigue, naps during the day. Appetite is "good", No N/V/D/C. No cough, no CHEN. NO chest pain/pressure. No headaches, no dizziness. No balance issues, minimal, walks with a cane. No falls. Urine  flos via tubes, no recent infections. NO blood in urine. Some minor urine amounts from penis. No nocturia, no dysuria. Hot flushes occur, less than before, 1-2 times a day. No edema noted. Independent in ADLs.  CDX done, no targets  5/15/24 - continues on Xtandi since early Jan 2024 for mCRPC. Feeling "alright". Ongoing fatigue, naps 1-2x/day. Occasional hot flashes. Decreased appetite, not eating as much as before, drinking 1-2 Ensure per day, weight is down 5lbs over the past month. Bilateral percutaneous nephrostomy tubes in place, no hematuria. Ongoing arthritic pains of the shoulders and hands at times. Denies fever, chills, night sweats, headache, dizziness, balance issues, chest pain, palpitations, SOB, cough, nausea/vomiting, diarrhea/constipation, abdominal pain, LE edema, bleeding.   6/12/24 - continues on Xtandi since early Jan 2024 for mCRPC, decreased to 80mg daily as of May 2024 for weight loss. Ongoing fatigue is stable. Ongoing hot flashes. Appetite is "a little better". Urine from nephrostomy tubes is clear yellow, no hematuria. Denies fever, chills, night sweats, headache, dizziness, balance issues, chest pain, palpitations, SOB, cough, nausea/vomiting, diarrhea/constipation, abdominal pain, LE edema, bleeding, muscle or joint pain/weakness.  7/11/24 - on Xtandi since early Jan 2024 for mCRPC, decreased to 80mg daily as of May 2024 for weight loss. Patient accompanied by brother, reports to be feeling well, appetite slowly improving, continues to have nephrostomy tubes in places, last changed 23 weeks ago draining clear yellow urine. Denies fever, chills, headaches, chest pain, sob, abdominal pain/back pain.   8/14/24 - on Xtandi since early Jan 2024 for mCRPC, decreased to 80mg daily as of May 2024 for weight loss. Overall feeling well, ongoing fatigue is stable. Ongoing hot flashes.  Appetite is stable, weight is up a couple lbs since last visit. Diarrhea yesterday "all day" with >6-7 episodes of soft/loose stool, he took PRN Imodium yesterday, no more stool today, unsure if it was r/t something he ate. Urine from nephrostomy tubes is "good", no hematuria. Denies fever, chills, night sweats, headache, dizziness, chest pain, palpitations, SOB, cough, nausea/vomiting, constipation, abdominal pain, LE edema, bleeding, muscle or joint pain/weakness.  9/17/24 - on Xtandi since early Jan 2024 for mCRPC, decreased to 80mg daily as of May 2024 for weight loss. Feeling "alright", stable fatigue. Ongoing hot flashes are tolerable. Appetite is stable. Having intermittent diarrhea approx every other week with approx 3 episodes when it occurs but resolves with PRN Imodium 1-2 tabs, having normal BMs in between, unsure if diarrhea is r/t eating spinach. Bilateral nephrostomy tubes in place. Denies fever, chills, night sweats, headache, dizziness, chest pain, palpitations, SOB, cough, nausea/vomiting, constipation, abdominal pain, hematuria, LE edema, bleeding, muscle or joint pain/weakness.  10/16/24 - on Xtandi since early Jan 2024 for mCRPC, decreased to 80mg daily as of May 2024 for weight loss. Ongoing fatigue is stable. Ongoing hot flashes are tolerable. Appetite is "alright". Occasional diarrhea is improved, only 2 episodes of diarrhea over the past month. Bilateral percutaneous nephrostomy tubes in place, still passes some urine through his penis. Ongoing intermittent arthritic pains of the shoulders and knees are stable since before cancer diagnosis. Denies fever, chills, night sweats, headache, dizziness, chest pain, palpitations, SOB, cough, nausea/vomiting, constipation, abdominal pain, dysuria, hematuria, incontinence, LE edema, bleeding.

## 2024-10-16 NOTE — REVIEW OF SYSTEMS
[Fatigue] : fatigue [Joint Pain] : joint pain [Hot Flashes] : hot flashes [Fever] : no fever [Chills] : no chills [Night Sweats] : no night sweats [Chest Pain] : no chest pain [Palpitations] : no palpitations [Lower Ext Edema] : no lower extremity edema [Shortness Of Breath] : no shortness of breath [Cough] : no cough [Abdominal Pain] : no abdominal pain [Vomiting] : no vomiting [Constipation] : no constipation [Dysuria] : no dysuria [Incontinence] : no incontinence [Joint Stiffness] : no joint stiffness [Muscle Pain] : no muscle pain [Muscle Weakness] : no muscle weakness [Dizziness] : no dizziness [Easy Bleeding] : no tendency for easy bleeding [Easy Bruising] : no tendency for easy bruising [de-identified] : occasional

## 2024-10-16 NOTE — ASSESSMENT
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 He started abiraterone + prednisone in June 2020, dose was reduced to 500mg d/t transaminitis. Abiraterone discontinued late December 2023 for disease progression. Xtandi started Jan 2024.   mCRPC: - PSA prior to start of Xtandi was 3.6 on 1/2/24, 5.17 on 2/14/24, 5.6 on 3/14/24, 6.8 on 4/16/24, 9.68 on 5/15/24, 8.13 on 6/12/24, 9.39 on 7/11/24, 10.8 on 8/14/24, 12.3 on 9/17/24.  - Repeat PSA today is pending. CT scans at end of May and bone scan in late June were stable. To consider repeat scans if PSA continues to rise.  - 5/30/24 CT chest/abd/pelvis shows stable disease, appearance of bone is grossly unchanged. 6/25/24 bone scan shows significant improve in skeletal lesions when compared to the prior Dec 2023 scan.  - Continue Xtandi 80mg daily (dose reduced d/t poor appetite and weight loss), tolerating better at a decreased dose.  - Feb 2024 [facility] Liquid CDx was negative for any actionable mutations, MSI high not detected, TMB 8 muts/Mb, ctDNA <1%.  - Continue Eligard q6mo inj with urology, given 6/10/24, next scheduled for 12/12/24.   Bones: - He is off Ca + D supplementation d/t hypercalcemia.  - Monthly Xgeva inj started 5/15/24, given today   Diarrhea: - Occasional diarrhea is improved, only 2 episodes over the past month, monitor closely.  - Continue PRN Imodium per package insert. Instructed to contact the office if diarrhea is more persistent.   CKD: - Baseline Cr has been 1.52 - 1.87 over the past year - 8/14/24 Cr = 1.5 - 9/17/24 Cr = 1.5 - Repeat CMP today is pending.  - Continue follow up with nephrology.  Instructed to contact our office with any new/worsening symptoms. Pt and family member educated regarding plan of care, all questions/concerns addressed to the best of my abilities and their apparent satisfaction. F/u in 1 month for provider visit + Xgeva inj, transfer care to Dr. Moody as Dr. Basurto is leaving [facility].  [AdvancecareDate] : 1/2/24 [FreeTextEntry2] : Health care proxy form provided to pt 12/5/23, he still has at home. Instructed once again to bring the completed form to his next office visit.

## 2024-11-16 NOTE — HISTORY OF PRESENT ILLNESS
[Disease: _____________________] : Disease: [unfilled] [T: ___] : T[unfilled] [N: ___] : N[unfilled] [M: ___] : M[unfilled] [de-identified] : This 66-year-old male was first seen in consultation on July 20, 2016. In August of 2013 his PSA was 4.3. In September 30, 2013 it was 4.9. He underwent a biopsy in March 5, 2014 revealing Ade 7 (3+4) disease. He underwent external beam radiation therapy for a total dose of 8100 cGy in 45 fractions from May 25 and total July 31 of 2014. He subsequently experienced PSA failure and brachytherapy was considered. A CT scan was performed as part of that evaluation revealing a filling defect in the left ureter. In March 2016, he presented to the emergency room with a creatinine of 13.7 and potassium of 8.9. Bilateral nephrostomy tubes were placed. He was found to have GI bleeding and a colonoscopy revealed evidence of radiation proctitis. He has been on Lupron. 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 His initial staging was stage II, [id number].    The original pathology was reviewed prior to radiation. The left base revealed adenocarcinoma the prostate, Ade score 7 (3+4) involving 2 out of 2 cores, 40% of each core, with perineural invasion. The left mid zone revealed adenocarcinoma the prostate, Ade score 7 and (4+3) involving 50% of each of 2 cores, with perineural invasion noted. The left apex had 3 of 3 cores involved. One core was Ade 7 (3+4) involving 100% of the core. The other 2 cores had a Ade 6 (3+3) in less than 5% of 2 cores. Perineural invasion was identified as well.  Posttreatment biopsy revealed the presence of adenocarcinoma within the prostate gland. The PSA was 11 at the time of the biopsy.  Feels well at initial consultation. Started Lupron in January 2016 before the renal failure/obstruction. No pains. Fatigue at times. Appetite good, stable weight. Lost weight with illness in March, typical weight prior was 210, left hospital at 170. Weight stable. He was transfused multiple times. Some urine through penis recently. Has bilateral percutaneous nephrostomies. No blood, dysuria, no incontinence. Nocturia x 2-3 at this time. Right sided nephrostomy still has output, small amount in left bag. Due for change of tubes in September. Had radiation proctitis and has laser treatment. Sees GI in follow-up next month.   10/18/16...Had a colonoscopy, showed radiation colitis. No further rectal blood noted. Urine flow mostly from PCNs, some from penis. No dysuria, some  hematuria noted at times. Appetite is normal. No weight loss. No fatigue. Hot flushes occur daily. .  4/18/17...last scans 9/26.  Saw PCP and had PSA done, shows slight increase over manjinder. he was given ferrous sulfate to take for anemia. No pains. Urine flow is mostly thru PCNs. Still has occasional blood in the urine, noted in the bag. Has less volume form the left side.  Hot flushes occur a few a day, 15-20 minutes, occ sweats. Appetite is good, no weight loss. Some fatigue noted. No edema. Occ blood with stools, saw Dr Grossman in September, due for follow up. has not had major bleeding recently.   8/1/17...Surya states that he is feeling good, his urine flow is strong, slight increase, no pains anywhere, he gets hot flashes a "few  day, they are coming." He has some minor fatigue, He has percutaneous nephrostomies, He follows with Dr. Dick, no obvious hematuria, no breast tenderness.  11/14/17...Received Lupron from Dr Dick a few weeks ago. PSA was 0.02 on 8/1/17. He gets hot flashes a couple times a day, He has b/l nephrostomies, is able to pass urine through his penis. He denies hematuria, dysuria He states appetite is good. He gets some fatigue, He denies any pains.  4/17/18...Received Lupron from Dr Dick Jan 23, 2018. Not seen since November 17, due to illness. 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 Feels well. No pains noted. Some fatigue. Appetite is good. Reports that he has diarrheal for 2-3 months, not daily. He had loose stools this AM, took Imodium. Diarrhea occurs 1-2 days a week, the n stops. Some flatus, no cramps. No blood in the stool. Urine flow is via nephrostomy, minor amount thru penis, no dysuria, no incontinence. No blood in urine. Hot flushes most days.    Minor fatigue. Appetite normal.   7/17/18...On CAB. Feels "okay". No pains. Has bilateral nephrostomy tubes, due for change in August. No recent infections, occasionally passes urine via penis, no blood, no dysuria. Still has hot flushes, daily, more in the winter. Some fatigue, no physical impairment. Appetite is good. Weight stable. Diarrhea persists, on and off, not daily. Watery at times. takes Imodium on occasion. Occ small amount of blood with hard stools, secondary to radiation proctitis.   10/23/18...Feels "okay". No pains. Urine flow is minimal, most comes out of the nephrostomy tubes, nocturia -none. No blood, no dysuria. No incontinence. Hot flushes occur, day and night. Appetite is good, no weight. No dyspepsia, no nausea, no vomiting. Diarrheal stools occur episodically, every 2-3 weeks. No blood in the stool. Usually 1-2 BMs per day. Occasionally notes blood on toiler paper with hard stool. has RT proctitis.   2/5/19...Feels well, no pains. has hot flushes frequently, daily. Has some sweats with them at night. Appetite is good, no weight loss. Urine flow is minimal thru penis, nocturia does not occur. Most urine form PCNs, last catheter change January 2019, by IR at [facility]. Occ minor blood in the urine, when the catheter is loose. No back pains, no bone pains. Occ fatigue.   5/7/19...Had a calcium of 11.1 last visit. Called him and told to stop calcium and vit D. Repeat 3 weeks later was normal. Continues on Lupron and Casodex. Feels well. Hot flushes every day. He has some fatigue, unchanged. Appetite is good, no weight loss. Urine is minimal from penis, has bilateral PCNs. No bone pains. No edema.   8/8/19...Feels "all right", as a URTI. No pains noted. Hot flushes multiple time a day. Fatigue is present, mild. Urine flow is 'good', PCN bilateral, most flow form the right. Some flow thru penis, voids 1-2 times a day. NO blood, no dysuria. Due for PCN change next week. Appetite is good, no weight change.  10/29/19...Local failure prostate cancer, on CAB. Feels well. No pains., Urine flow is good. Has bilateral PCNs. Most of urine goes to PCNs. Occ small amount of blood in the bag. Voids 2 times a day. Hot flushes, daily, with sweats on occasion. has fatigue at times. Appetite is good, no weight loss. NO leg edema. No bleeding from the RT proctitis recently  1/28/20...Stopped bicalutamide after last visit. Last seen 3 months ago. PSA on 1/7 was 0.27, thus continued to rise somewhat. NO bone pains. Hot flushes daily, occ sweats. Appetite is good, no weight loss. No fatigue of note. Urine flow is good, but most flow is into the PCNs. Last change of PCNs was December after he had some leaking on one side. No hematuria, no dysuria. No incontinence.   5/6/20...Verbal permission granted for telephone services by patient, Surya Cervantes, on 5/6/20 at 1:35 PM.   Fells well. No pains noted. Nephrostomy tubes in place, replaced last week. Appetite is good, no weight loss. Hot flushes daily. He has chronic fatigue complaints. No leg edema noted. Urine through penis is minimal, only when tubes become clogged. No blood in the urine. No nausea, no vomiting. Has diarrhea at times, once every 2-3 weeks. No back pains noted. No fevers, no chills., No cough, no sputum.   6/17/20...Verbal permission for telephone contact was granted by the patient, Surya Cervantes, on June 17, at 4 PM. Feels "all right". NO pains. Hot flushes, daily, occ minor sweats. Fatigue is present, rated mild, occasional naps. Appetite is good, no weight loss. No edema. Urine flow is minimal from penis, has bilateral PCNs. No blood. No incontinence. No recent infections. No cough NO CHEN. No F/C. Helps in care of his mother, watches TV, not very active. Jeana from [employer].   7/29/20...Verbal permission for telephone contact was granted by the patient, Surya Cervantes, on July 29, 2020 at 3:10 PM.  Started tawanda/pred on June 16, 2020. Feels  "pretty good". He was having diarrhea prior and he no longer had diarrhea.   No pains at this time. No edema. No headaches. Checks BP on occasion.  BPs have been "normal", he will check often. Urine flow "about the same",. Most urine comes from PCNs. No nocturia, no  hematuria, no dysuria. Appetite is normal, thinks he may have lost a few pounds. No cough, No CHEN. NO F/C. Hot flushes occur daily. No change. Mild fatigue.  8/26/20...On abiraterone and prednisone since June, PSA dropped significantly. taking meds correctly. Feels 'all right", no pains. Fatigue is present, as before, "not terrible", takes naps. Slightly more fatigue than before. Appetite is good, but lost weight. eats with his mother, she eats less and so does he. No nausea, no vomiting. No headaches, no edema of note. Most of urine comes from PCNs, Urine from penis is minimal, does not get up at night. No blood, no dysuria. No incontinence. No blood in the PCN bags.  Has leg cramps daily, particularly in the AM. Dr Angulo administers Lupron, due for Rx on 9/1/20.   9/22/20...He is seen in the office today in follow-up. He's been on abiraterone and prednisone since June. He had elevated transaminases on September 3 and his abiraterone was subsequently held. He will have repeat blood work done today after today's visit. He states that he feels "all right." His appetite is stated to be "all right." His weight has been stable. He denies dyspepsia, nausea or vomiting. He denies constipation or diarrheal stools. There is no cough or shortness of breath. He denies skin rashes or pruritus. There are no complaints of pains at this time. Fatigue is present but it does not impair his daily activities. He takes occasional naps during the day. He denies arthralgias or myalgias. There are no headaches or dizziness. He has bilateral percutaneous nephrostomy tubes draining yellow urine. There is no blood in the percutaneous nephrostomy bags. Most of the urine comes from the percutaneous nephrostomy tubes with minimal amount of urine coming out through the penis. There is no nocturia. There is no hematuria or dysuria. There is no incontinence. There is no edema in the extremities. He has occasional hot flashes but felt it has improved these past few days. He denies fevers or chills. He infrequently checks his blood pressure at home. He would get blood pressure readings ranging from 150s-160s systolic over 80s-90s diastolic. He remains independent in his activities of daily living.   10/27/20...Verbal permission for telephone services granted by the patient,  Surya Cervantes on October 27, 2020 at 3:48 PM.  Stared abiraterone in June. Feels "pretty good". NO increased edema. No headaches. Hot flushes are less. Appetite is good, no weight loss. Has PCNs, has urine form the penis at times. NO blood in the urine. No dysuria. No nocturia. No incontinence. No bone pains noted. Fatigue  RTs, takes a nap at times. Cares for his elderly mother at home.   11/25/20...Verbal permission for telephone services granted by the patient,  Surya Cervantes on November 25, 2020 at 1:50 PM On 1/2 dose tawanda due to transaminases. No pains, feels "all right". Some fatigue. Urine flow form penis in minimal, has bilateral PCNs. Occasional small amount of blood in the right PCN tube. Appetite is good, no weight loss. No cough, no CHEN. Hot flushes daily. No headaches, no edema. BP monitored at home, 151/89, pulse 95.   12/23/20...Verbal permission for telephone services granted by the patient,  Surya Cervantes on December 23, 2020 at  1:32 PM.  Feels well. No pains noted. Urine flow RTS. Has PCNs, minimal urine via penis on occasion. No blood in urine or tubes of note. Appetite is good, no weight loss. NO edema of the legs. NO headaches, no dizziness. No cough, no CHEN. No F/C. Hot flushes occur daily, less often. Due Lupron next month from Dr Angulo. Mild fatigue, take a nap. Independent in ADLs, cares for his mother. BP at home 138/90, pulse 71. Left arm 147/92  1/28/21...Verbal permission for telephone services granted by the patient,  Surya Cervantes on January 28, 2021 at 315 PM.  Surya reports that he has no pains.  There is no significant urine within his blood.  In the interim, he had to have the left percutaneous nephrostomy tube changed as there was pus present.  They had some difficulties apparently.  The tube was clogged and he was unable to change it over a guidewire.  He was not treated with any antibiotics.  He does have some urine from the penis at times.  There is no fevers or chills.  His appetite is good and there is no weight loss.  He reports that his weight is 190 pounds today.  He says his blood pressure was 146/92 in the left arm and 145/85 on the right arm.  The pulse rate was 75.  He reports no edema or headaches.  There are some hot flushes.  2/25/21...Verbal permission for telephone services granted by the patient,  Surya Cervantes on 2/25/21 at 3:10 PM Feels "all right". NO pains. He was having issues with PCNs clogged, had to have removal and reinsertion. It was the right side this week, done on Tuesday. Told to flush it daily rather than every 3 days. Appetite is good, no weight loss, no edema. No cough, no CHEN. BP is monitored, right side 139/84 today, left 134/82, pulse 79. No headaches, no significant fatigue, does nap on occasion. NO leg weakness. No N/V/D/C. Minimal urine from penis. There was more flow when the dysfunction occurred. NO blood in the urine, no dysuria.   4/7/21...  10/26/21....Last evaluation was 4/7/21, by telephone. Completed antibiotics. Was in rehab for 6 weeks. Strength is good, no pains. Appetite is good, gained some weight. urine flow is good, no incontinence, very little urine from penis, mostly from the tubes. No headaches, no dizziness, no balance issues, No falls. Hot flushes occur. Lives with mother, helps her. No edema at this time.   From neurosurgery, this summary of events....70 yo Male w/ PMHx of HTN, prostate CA (on chemo, s/p radiation), b/l nephrostomy tubes, presented  to [facility] ED on 6/14/21 with weakness and confusion. In ED foulurine in b/l nephrostomy bags noted, tachycardic, and hypotensive. s/p IVF and levo drip for septic shock, developed fluid overload with pitting edema and decreased  EF. Imaging of the spine concerning for discitis and osteomyelitis c-spine with T1 inflammation/signal changes.   Hospital coursed remarkable for BC + Klebsiella and strep anguinous. Started on vanco/zosyn 6/14. As per ID switched to ceftriaxone 2 g on 6/17. Hospitalization c/b ASHLEY on CKD with right hydronephrosis. IR consulted- no intervention, both nephrostomy tubes flushing adequately. Nephrology  consulted, started on stress dose steroids/hydrocortisone 50 mg q6 and Midodrine. CTAP showed Sludge in gallbladder.  Patient  underwent C6-7 corpectomy and posterior fusion C4-T2 on 6/29/21. Hospital course c/b tachycardia, fever with low O2 sats. Chest CT negative for  PE, lower extremity Dopplers negative. s/p PICC, discharged to inpatient rehab on 7/12/21 and then was discharged home after completion of rehab.   Last Eligard was August with Dr Angulo, 30 mg dose.   11/30/21...on tawanda/pred since June 2020. Feels  "all right". Has hot flushes and fatigue. Does not prevent activities. He falls asleep easily if he sits in a chair. Independent in ADLs, cares for his mother. No infections recently, No F/C. No recent adventures with with his PCNs. No blood in urine, occ urine via penis. Appetite is good, gained 2.2 kilos. Edema decreased. No chest pain/pressure. No cough/ CHEN. No headaches, no dizziness. No N/V/D/C.   1/11/22....telehealth today. Feels  "all right". No pains of note. t flushes daily. Has some fatigue as well. No edema noted. NO cough/CHEN No chest pain/pressure. No F/C. Appetite is good, no weight loss. Weighs 176. Checks BP at home, 142/98 RP 72 today, L arm, 142/91. Takes terazosin at night. Sees PCP in March. Needs to call PCP for tighter BP control. NO headaches, no dizziness. No F/C. No hematuria, has bilateral PCNS, has some small amount of urine from the penis. Had change of PCNs last week. Independent in ADLs.   2/8/22 - telehealth visit today. Continues on abiraterone 500mg daily + prednisone. Reports his BP today was 143/94 with HR of 74. Overall feeling "alright". Moderate fatigue, takes naps during the day. Occasional hot flashes. Appetite is good, weight today is 180lbs, he attributes recent weight gain to eating better after being discharged from the hospital. Mild ptosis of left eyelid comes and goes and has reportedly been stable for his "whole life". Bilat percutaneous nephrostomy tubes in place, no hematuria. Ongoing mild BLE edema is reportedly stable. Ongoing intermittent arthritis pains of left knee and right shoulder are stable. Denies fever, chills, headache, dizziness, balance issues, eye pain/problems, mucositis/odynophagia, chest pain, palpitations, SOB, cough, nausea/vomiting, diarrhea/constipation, abdominal pain, hematuria, rash/pruritus, neuropathy, bleeding, weakness, anxiety/depression.  3/17/22...tawanda/pred started mid June 2020. PSA was 11.6 at start. Feels "all right". No pains. No major issues with the PCNs, having a change done next week. Appetite is good, no weight loss. No cough, No CHEN. Some leg edema. No chest pain/pressure. Urine from penis is relatively little. No dysuria, no blood. has calcium oxalate excretion, which causes his tubes to be clogged. Notes some bruising. NO headaches, no dizziness, no balance issues. No F/C. No N/V/C, some minor diarrheal stools at times. Takes Imodium as as needed. Fatigue is present at times, has hot flushes occur multiple times a day.   4/20/22 - continues on abiraterone (500mg daily) + prednisone since June 2020 for mCRPC. Overall feeling "alright", ongoing mild fatigue is stable. Intermittent hot flashes are tolerable. Mild night sweats during the summer when it gets warmer. Ongoing mildly productive cough which he's had for "a long time". Occasional diarrhea that doesn't last more than a day. Bilateral percutaneous nephrostomy tubes in place, passing minimal urine from his penis. Occasional hematuria especially from right nephrostomy bag, clear today. Ongoing mild BLE edema. Ongoing chronic left knee swelling and pain 2/2 arthritis. Ongoing right shoulder pain 2/2 arthritis as well. Denies fever, chills, headache, dizziness, balance issues, eye pain/problems, mucositis/odynophagia, chest pain, palpitations, SOB, nausea/vomiting, constipation, abdominal pain, rash/pruritus, bleeding, muscle pain/weakness  5/24/22 - continues on abiraterone (500mg daily) + prednisone since June 2020 for mCRPC. BP today is 164/95, asymptomatic. BP at home has been 150s/80-90s. On occasion the DBP has been >100. Overall feeling "alright", ongoing fatigue is stable. Ongoing intermittent hot flashes. Appetite is good. Vision is changing in his left eye, he has an appt with Noah this Thurs. Bilateral nephrostomy tubes in place, occasional hematuria at times resolves after 2-3 days. Ongoing trace edema of BLEs. Intermittent pruritus around nephrostomy tube dressings. Denies fever, chills, night sweats, headache, dizziness, balance issues, mucositis/odynophagia, chest pain, palpitations, SOB, cough, nausea/vomiting, diarrhea/constipation, abdominal pain, dysuria, incontinence,  rash, neuropathy, bleeding, muscle or joint pain/weakness.   6/21/22 - continues on abiraterone, half dose + prednisone since June 2020 for mCRPC. PSA 11.6 at start of treatment in June 2020, manjinder PSA at o.o5 August 2021, slow rise, recent PSA in May 2022 was 0.17.  feels  "all right". No pains noted. Has bilateral PCNs, changed last week. No infections, no F/C. Some fatigue, always, no worse. Appetite is good, no weight loss. NO headaches, no dizziness, no balance issues. Some edema noted, as before. No chest pain/pressure. NO bone pains. No N/V/D/C. Small amounts of urine via the penis. Hot flushes daily, multiple times. No cough, no CHEN.   7/28/22 - continues on abiraterone 500mg + prednisone since June 2020 for mCRPC. Overall feeling "alright", ongoing mild fatigue for which he occasionally takes a nap once a day. Ongoing hot flashes are tolerable. Appetite is good. Occasional cough. Bilateral percutaneous nephrostomy tubes in place passing most of the urine through the bags but still passing minimal urine via penis, no nocturia. Trace edema of BLEs. Notes intermittent cramping of hands which may be arthritis as it improves with movement. Has ongoing intermittent right buttock pain radiating down to the knee, present for the past year, pain is worse with sitting for a long time, improves with changing positions, max pain is 3/10 not requiring medication. Denies fever, chills, night sweats, headache, dizziness, balance issues, eye pain/problems, mucositis/odynophagia, chest pain, palpitations, SOB, nausea/vomiting, diarrhea/constipation, abdominal pain, dysuria, hematuria, incontinence, rash/pruritus, bleeding, weakness.   10/11/22 - continues on abiraterone 500mg daily + prednisone since June 2020 for mCRPC. Neurosurgical notes reviewed...."S/P cervical spinal fusion, 70 year old male 15 months pos op C6-7 corpectomy and posterior fusion C4-T2 on 6/29/21 with Dr. Tiffanie Martinez". Now seeing Dr Resendiz. Overall, feels "all right". No pains noted. urine flow is "fine", no incontinence, most urine still out PCNs, some from the penis. No blood, no dysuria. Hot flushes daily.  Some fatigue, RTS. Appetite is good, no weight loss. No headaches, dizziness, no balance issues, No paresthesias. Occ cough, sputum is white. No CHEN. Tubes to be changed next week. No N/V/D/C.  Some edema , improved. No chest pain/pressure  11/9/22 - continues on abiraterone 500mg daily + prednisone since June 2020 for mCRPC which he is tolerating well. Pt reports feeling generally well. No new complaints. Bilateral nephrostomy tubes in place, denies hematuria . Ongoing trace edema of BLEs. Intermittent pruritus around nephrostomy tube dressings. Denies fever, chills, night sweats, headache, dizziness, balance issues, mucositis/odynophagia, chest pain, palpitations, SOB, cough, nausea/vomiting, diarrhea/constipation, abdominal pain, dysuria, incontinence, rash, neuropathy, bleeding, muscle or joint pain/weakness. Appetite reported as good. No changes in medications  12/7/22 - continues on abiraterone 500mg daily + prednisone since June 2020 for mCRPC.  gets his Eligard 45 tomorrow form Dr Angulo. feels "all right". No pains noted except for arthrtic complaints. Appetite is okay< dropped a few pounds. usual edema, no change. No headaches, NO dizziness. Most of the urine comes from PCNs, changed every 6 weeks due to prior infections. NO fevers, no chills. Hot flushes occur, 1-2 times a day. Notes a bit more urine thru penis lately. Occ cough, no CHEN. No chest pain/pressure/palpitations. NO N/V/D/C. Mild fatigue. Independent in ADLs.   1/4/23 - continues on abiraterone 500mg daily + prednisone since June 2020 for mCRPC. Overall feeling "alright", ongoing fatigue is stable. Ongoing hot flashes are tolerable. Appetite is "ok". Ongoing occasional productive cough is stable. Bilateral perc nephrostomy tubes in place, continues to pass some urine through the penis, no nocturia. Denies fever, chills, night sweats,headache, dizziness, balance issues, eye pain/problems, mucositis/odynophagia, chest pain, palpitations, SOB, nausea/vomiting, diarrhea/constipation, abdominal pain, dysuria, hematuria, incontinence, LE edema, rash/pruritus, bleeding, muscle or joint pain/weakness.   2/13/23..... continues on abiraterone 500mg daily + prednisone since June 2020 for mCRPC. treated with cephalexin and developed rash, red skin, pruritus. Chart labeled as allergy. Hospitalization was brief. sudden onset of symptoms, Hd diarrheal stools as well. Feels  "pretty good", except for residual pruritus. No pains noted.  Has tube change set for next week. Appetite has retuned. No N/V. Diarrhea resolved, NO taste alteration. No headaches, no dizziness. had some edema. resolved. No chest pain/pressure/palpations.  has his usual fatigue and hot flushes. Cough, chronic, with clear sputum. No CHEN/SOB. No fevers of chills since discharge  Hospital Course:  Discharge Date	01-Feb-2023  Admission Date	29-Jan-2023 17:52  Reason for Admission	Syncope  Hospital Course	  69 yo M w/ HTN, gout, prostate cancer s/p b/l nephrostomy tubes, on abiraterone, admitted w/ sepsis 2/2 UTI, improved on antibiotics.  Sepsis secondary to UTI.  was febrile, tachycardic in the ED, met criteria for sepsis on admission  - U/A w/ +nitrite, large LE, prior cultures with klebsiella and E. coli  - current Ucx >3 organisms suggestive of collection contamination  - received IV zosyn (1/29/23-2/1/23),  Bcx negative, discharge on oral cephalexin to complete 10 days for complicated UTI (end date 2/7/23)  - sepsis resolved (currently afebrile, without leukocytosis, tachycardia resolved)  - IR consulted nephrostomy tubes in position and draining well, no indication for exchange at this time.   Gastroenteritis.   had reported nausea/vomiting/diarrhea after eating fried rice w/ beef  - s/p IV fluids, now resolved.   Syncope.   reports syncopal episode likely 2/2 dehydration from gastroenteritis  - s/p IV fluids, EKG sinus rhythm, troponin indeterminate but no increased  - reports symptoms resolved, ambulating around unit independently (advised call don't fall).   3/21/23..... continues on abiraterone 500mg daily + prednisone since June 2020 for mCRPC. treated with cephalexin and developed rash, red skin, pruritus. Chart labeled as allergy. Overall, he feels "all right". No pains of note. Appetite is good, no weight loss. No edema. No chest pain/pressure/palpitations. some cough, no CHEN. Hot flushes daily. Fatigue is present.  Does yoga, runs in place. No HA, no dizziness, no falls. Independent in ADLs. no fevers, no chills. Minimal urine from penis, no dysuria. PCNs are changed every 6 weeks, no blood in tubes. No N/V/D/C.   4/17/23.....  on abiraterone 500mg and prednisone since June 2020 for mCRPC. Prior transaminitis led to decrease in dose. "doing okay". Minor discomfort on left side near the PCN. Has change every 6 weeks at this time. Appetite is good, no weight loss. No N/V/D/C. Has some urine form the penis, noted if PCNs pinch. No blood in the urine, no hematuria. Hot flushes as before, about 3 times a day.  fatigue RTS. No cough, no CHEN, No fevers, no chills. No edema, no chest pains,pressure/palpitations  Exercises every AM.   5/16/23....continues on abiraterone 500mg and prednisone since June 2020 for mCRPC. Prior transaminitis led to decrease in dose. Eligard next month with Dr Angulo. Overall, feels "all right". No pains noted. Appetite is good, no weight loss. No N/V/D/C. Urine from penis on occasion, empties bladder before bed. No hematuria, no dysuria. No fevers, no chills. PCNs were changed last week. No headaches, no dizziness, no balance issues.  Exercises daily, walking and running. Occ cough, no CHEN. No chest pain/pressure/palpitations. Hot flushes remains. No fevers, no chills  6/12/23.... on abiraterone 500mg and prednisone since June 2020 for mCRPC. Prior transaminitis led to decrease in dose. Mara with Dr Angulo. Feels "well". NO pains, except for radicular pain in right thigh, present for about 6 weeks, constant. uses a topical, icy-hot. Does not awaken him, better when he is lying down, aggravated by ambulation. Appetite is normal. No weight loss. NO fevers, no chills. NO fatigue. No headaches, no dizziness, no paresthesias. No cane. No falls. No cough, no CHEN. NO N/V/D/C. Urine from PCNs. Some from the penis, no hematuria, no dysuria. No nocturia. Hot flushes daily. No edema. NO chest pain/pressure/palpitations. No back pains. Independent in ADLs  7/18/23 - continues on abiraterone + prednisone (500mg daily d/t transaminitis) for mCRPC since June 2020. Ongoing fatigue after periods activity, naps once a day. Ongoing hot flashes are tolerable. Appetite is good. Occasional cough. Bilateral perc nephrostomy tubes in place, scant blood if the tubing moves around but not persistent. Ongoing pain in right buttock radiating down posterior thigh/leg, no pain with lying down, pain is worse with sitting for a prolonged time and improves with walking, max pain is 8/10. Not taking any pain medication as the pain eases up when he gets up and walks around.   8/16/23 - continues on abiraterone + prednisone (500mg daily d/t transaminitis) for mCRPC since June 2020. Overall feeling "alright", ongoing fatigue is stable. Ongoing frequent hot flashes, tolerable. Appetite is "alright". Ongoing occasional cough. Perc nephrostomy tubes exchanged 2wks ago, occasional transient hematuria which he attributes to accidentally pulling on the tubes. Ongoing stable intermittent right buttock pain radiating down posterior thigh down to the calf, improves with movement and worse with sitting for a long time, max pain is 5/10. Using a topical cream called "Australian Dream" which has been helpful.   9/20/23 - continues on abiraterone + prednisone (500mg daily d/t transaminitis) for mCRPC since June 2020. last PCN change was last week. On a 6 week change protocol. Feels "not bad", chronic leg pains, form right buttock, down the leg. Passing urine via penis, very small amounts. No blood, no dysuria. No nocturia. No incontinence, voids about 2 times a day. Hot flushes, every day, no sweats. Fatigue is present, naps, feels refreshed afterwards. Does yoga, stretching, walking, lifts some weights, daily basis. Appetite is good, no weight Kenji Occ cough, no CHEN. No edema, no chest pain/pressure.   10/31/23 - continues on abiraterone + prednisone (500mg daily d/t transaminitis) for mCRPC since June 2020. Overall feeling "alright", ongoing fatigue is stable. Occasional hot flashes. Appetite is "alright". Perc nephrostomy tubes in place, passing minimal urine through his penis. Ongoing pain in right buttock that radiates down the posterior leg, worse in the morning when first waking up and worse with sitting for a prolonged period, improves/resolves with activity. Denies fever, chills, night sweats, headache, dizziness, balance issues, chest pain, palpitations, SOB, cough, nausea/vomiting, diarrhea/constipation, abdominal pain, dysuria, hematuria, incontinence, LE edema, bleeding.   12/5/23 - continues on abiraterone + prednisone (500mg daily d/t transaminitis) for mCRPC since June 2020. Overall feeling well, some fatigue but remains active and exercising regularly. Ongoing hot flashes are tolerable. Appetite is good. Bilateral percutaneous nephrostomy tubes in place, no hematuria. Ongoing right buttock pain that radiates down the posterior leg, worse in the morning and improves with activity, max pain is 4-5/10, declines referral to PM&R. Denies fever, chills, night sweats, headache, dizziness, balance issues, chest pain, palpitations, SOB, cough, nausea/vomiting, diarrhea/constipation, abdominal pain, LE edema, bleeding.  1/2/24 - on abiraterone + prednisone since June 2020, discontinued 12/26/23 for POD seen on 12/21/23 bone scan. Feeling well, no significant fatigue. Remains active and doing exercises daily. Occasional hot flashes are tolerable. Bilateral percutaneous nephrostomy tubes in place, no hematuria. Ongoing right buttock pain that radiates down the posterior leg, worse in the morning and improves with activity, max pain is 7-8/10, pain is stable. Denies fever, chills, night sweats, headache, dizziness, balance issues, chest pain, palpitations, SOB, cough, nausea/vomiting, diarrhea/constipation, abdominal pain, LE edema, bleeding.   2/14/24 - Xtandi started early Jan 2024 for mCRPC. Over the past week he's been very fatigued. Ongoing hot flashes. Appetite is decreased and not eating as much as he used to, weight is down 11lbs over the past 6wks. Stable cough since before start of Xtandi. Percutaneous nephrostomy tubes in place, no hematuria. Notes some pains in the hands, knees, and ankles which he attributes to arthritis. Ongoing pain in right buttock radiating down posterior leg, pain is worse in the AM when first waking up and improves with activity/walking, pain waxes and wanes, max pain is 7/10 but not taking any medication for this. Denies fever, chills, night sweats, headache, dizziness, balance issues, chest pain, palpitations, SOB, nausea/vomiting, diarrhea/constipation, abdominal pain, LE edema, bleeding.   3/14/24 - continues on Xtandi since early Jan 2024 for mCRPC. Overall feeling ok, fatigue is a little improved which he believes is because his BP hasn't been as low as it was in the past. BP at home has been 120's/70-80's in the mornings. Ongoing hot flashes are not as intense. Appetite is decreased, weight is down 7lbs over the past month, but overall down 18lbs over 2 months. Urine from bilateral percutaneous nephrostomy tubes is "good", no hematuria. Ongoing pain in right buttock down the posterior leg, pain is worse with sitting for a prolonged period, improves with getting up and moving around, not interfering with his sleep, not needing any medication for pain. Denies fever, chills, night sweats, headache, dizziness, balance issues, chest pain, palpitations, SOB, cough, nausea/vomiting, diarrhea/constipation, abdominal pain, LE edema, bleeding.  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 NO AAW benefit. Started tawanda/pred mid June 2020, discontinued 12/26/23 for disease progression. Xtandi started early Jan 2024, decreased to 80mg daily in May 2024 d/t weight loss.  [de-identified] : 4/16/24 - continues on Xtandi since early Jan 2024 for mCRPC. Excess lambda light chains on last blood work, no M-spike. Feels well. no Pains. Has to some fatigue, naps during the day. Appetite is "good", No N/V/D/C. No cough, no CHEN. NO chest pain/pressure. No headaches, no dizziness. No balance issues, minimal, walks with a cane. No falls. Urine  flos via tubes, no recent infections. NO blood in urine. Some minor urine amounts from penis. No nocturia, no dysuria. Hot flushes occur, less than before, 1-2 times a day. No edema noted. Independent in ADLs.  CDX done, no targets  5/15/24 - continues on Xtandi since early Jan 2024 for mCRPC. Feeling "alright". Ongoing fatigue, naps 1-2x/day. Occasional hot flashes. Decreased appetite, not eating as much as before, drinking 1-2 Ensure per day, weight is down 5lbs over the past month. Bilateral percutaneous nephrostomy tubes in place, no hematuria. Ongoing arthritic pains of the shoulders and hands at times. Denies fever, chills, night sweats, headache, dizziness, balance issues, chest pain, palpitations, SOB, cough, nausea/vomiting, diarrhea/constipation, abdominal pain, LE edema, bleeding.   6/12/24 - continues on Xtandi since early Jan 2024 for mCRPC, decreased to 80mg daily as of May 2024 for weight loss. Ongoing fatigue is stable. Ongoing hot flashes. Appetite is "a little better". Urine from nephrostomy tubes is clear yellow, no hematuria. Denies fever, chills, night sweats, headache, dizziness, balance issues, chest pain, palpitations, SOB, cough, nausea/vomiting, diarrhea/constipation, abdominal pain, LE edema, bleeding, muscle or joint pain/weakness.  7/11/24 - on Xtandi since early Jan 2024 for mCRPC, decreased to 80mg daily as of May 2024 for weight loss. Patient accompanied by brother, reports to be feeling well, appetite slowly improving, continues to have nephrostomy tubes in places, last changed 23 weeks ago draining clear yellow urine. Denies fever, chills, headaches, chest pain, sob, abdominal pain/back pain.   8/14/24 - on Xtandi since early Jan 2024 for mCRPC, decreased to 80mg daily as of May 2024 for weight loss. Overall feeling well, ongoing fatigue is stable. Ongoing hot flashes.  Appetite is stable, weight is up a couple lbs since last visit. Diarrhea yesterday "all day" with >6-7 episodes of soft/loose stool, he took PRN Imodium yesterday, no more stool today, unsure if it was r/t something he ate. Urine from nephrostomy tubes is "good", no hematuria. Denies fever, chills, night sweats, headache, dizziness, chest pain, palpitations, SOB, cough, nausea/vomiting, constipation, abdominal pain, LE edema, bleeding, muscle or joint pain/weakness.  9/17/24 - on Xtandi since early Jan 2024 for mCRPC, decreased to 80mg daily as of May 2024 for weight loss. Feeling "alright", stable fatigue. Ongoing hot flashes are tolerable. Appetite is stable. Having intermittent diarrhea approx every other week with approx 3 episodes when it occurs but resolves with PRN Imodium 1-2 tabs, having normal BMs in between, unsure if diarrhea is r/t eating spinach. Bilateral nephrostomy tubes in place. Denies fever, chills, night sweats, headache, dizziness, chest pain, palpitations, SOB, cough, nausea/vomiting, constipation, abdominal pain, hematuria, LE edema, bleeding, muscle or joint pain/weakness.  10/16/24 - on Xtandi since early Jan 2024 for mCRPC, decreased to 80mg daily as of May 2024 for weight loss. Ongoing fatigue is stable. Ongoing hot flashes are tolerable. Appetite is "alright". Occasional diarrhea is improved, only 2 episodes of diarrhea over the past month. Bilateral percutaneous nephrostomy tubes in place, still passes some urine through his penis. Ongoing intermittent arthritic pains of the shoulders and knees are stable since before cancer diagnosis. Denies fever, chills, night sweats, headache, dizziness, chest pain, palpitations, SOB, cough, nausea/vomiting, constipation, abdominal pain, dysuria, hematuria, incontinence, LE edema, bleeding.   11/14/24: presents for follow up and management of metastatic CRPC on ADT + reduced dose enzalutamide in setting of weight loss and fatigue. His PSA has been noted to be rising over the last several visits from a manjinder of 8.13 to 15.3 at last visit on 10/16/24. He denies any new significant complaints since last visit.

## 2024-11-16 NOTE — REVIEW OF SYSTEMS
[Fever] : no fever [Chills] : no chills [Night Sweats] : no night sweats [Fatigue] : fatigue [Chest Pain] : no chest pain [Palpitations] : no palpitations [Lower Ext Edema] : no lower extremity edema [Shortness Of Breath] : no shortness of breath [Cough] : no cough [Abdominal Pain] : no abdominal pain [Vomiting] : no vomiting [Constipation] : no constipation [Dysuria] : no dysuria [Incontinence] : no incontinence [Joint Pain] : joint pain [Joint Stiffness] : no joint stiffness [Muscle Pain] : no muscle pain [Muscle Weakness] : no muscle weakness [Dizziness] : no dizziness [Hot Flashes] : hot flashes [Easy Bleeding] : no tendency for easy bleeding [Easy Bruising] : no tendency for easy bruising [de-identified] : occasional

## 2024-11-16 NOTE — PHYSICAL EXAM
[Fully active, able to carry on all pre-disease performance without restriction] : Status 0 - Fully active, able to carry on all pre-disease performance without restriction [Normal] : affect appropriate [de-identified] : anicteric  [de-identified] : no LE edema

## 2024-11-16 NOTE — ASSESSMENT
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 He started abiraterone + prednisone in June 2020, dose was reduced to 500mg d/t transaminitis. Abiraterone discontinued late December 2023 for disease progression. Xtandi started Jan 2024.  He experienced transient benefit but appears to have progressing disease at this time as manifested by steadily rising PSA since 6/2024 and new lesions noted on CT imaging performed 10/30/24.   We discussed these results and that he will need an alternative therapy to control his disease. Standard of care options include taxane chemotherapy and Pluvicto. He would like to defer chemotherapy initiation until the new year if possible however it appears his PSA appears to be rising more quickly, with further increase to 21.4 today.   If he is amenable, we will plan to initiate docetaxel within the next 2-3 weeks.   mCRPC: rising PSA on ADT + Xtandi. Previously progressed on tawanda/pred.  - Continue Xtandi 80mg daily (dose reduced d/t poor appetite and weight loss), tolerating better at a decreased dose.  - Feb 2024 [facility] CDx was negative for any actionable mutations, MSI high not detected, TMB 8 muts/Mb, ctDNA <1%.  - Continue Eligard q6mo inj with urology, given 6/10/24, next scheduled for 12/12/24.  - will plan to discontinue Xtandi and initiate docetaxel, pending further discussion with patient and family  Bones: - He is off Ca + D supplementation d/t hypercalcemia.  - Monthly Xgeva inj started 5/15/24,  Instructed to contact our office with any new/worsening symptoms. Pt and family member educated regarding plan of care, all questions/concerns addressed to the best of my abilities and their apparent satisfaction.  f/u by phone next week to review PSA results and timing of docetaxel initiation [Palliative Care Plan] : not applicable at this time [With Patient/Caregiver] : With Patient/Caregiver [AdvancecareDate] : 1/2/24 [FreeTextEntry2] : Health care proxy form provided to pt 12/5/23, he still has at home. Instructed once again to bring the completed form to his next office visit.

## 2024-12-02 NOTE — ASSESSMENT
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 He started abiraterone + prednisone in June 2020, dose was reduced to 500mg d/t transaminitis. Abiraterone discontinued late December 2023 for disease progression. Xtandi started Jan 2024.  He experienced transient benefit but appears to have progressing disease at this time as manifested by steadily rising PSA since 6/2024 and new lesions noted on CT imaging performed 10/30/24.   We discussed these results and that he will need an alternative therapy to control his disease. Standard of care options include taxane chemotherapy and Pluvicto. Given further increase in PSA to 21.4, we discussed the potential risks and benefits associated with docetaxel. He was amenable and signed informed consent.   He will proceed with cycle 1 docetaxel 75mg/m2 and return in 3 weeks for C2.     mCRPC: now s/p POD on tawanda/pred ->enzalutamide.  - Discontinue enzalutamide - Feb 2024 [facility] CDx was negative for any actionable mutations, MSI high not detected, TMB 8 muts/Mb, ctDNA <1%.  - Continue Eligard q6mo inj with urology, given 6/10/24, next scheduled for 12/12/24.  - Proceed with cycle 1 docetaxel 75mg/m2  Bones: - He is off Ca + D supplementation d/t hypercalcemia.  - Monthly Xgeva inj started 5/15/24,  Instructed to contact our office with any new/worsening symptoms. Pt and family member educated regarding plan of care, all questions/concerns addressed to the best of my abilities and their apparent satisfaction.  [AdvancecareDate] : 1/2/24 [FreeTextEntry2] : Health care proxy form provided to pt 12/5/23, he still has at home. Instructed once again to bring the completed form to his next office visit.

## 2024-12-02 NOTE — PHYSICAL EXAM
[Fully active, able to carry on all pre-disease performance without restriction] : Status 0 - Fully active, able to carry on all pre-disease performance without restriction [Normal] : affect appropriate [de-identified] : anicteric  [de-identified] : no LE edema

## 2024-12-02 NOTE — REVIEW OF SYSTEMS
[Fatigue] : fatigue [Joint Pain] : joint pain [Hot Flashes] : hot flashes [Fever] : no fever [Chills] : no chills [Night Sweats] : no night sweats [Chest Pain] : no chest pain [Palpitations] : no palpitations [Lower Ext Edema] : no lower extremity edema [Shortness Of Breath] : no shortness of breath [Cough] : no cough [Abdominal Pain] : no abdominal pain [Vomiting] : no vomiting [Constipation] : no constipation [Dysuria] : no dysuria [Incontinence] : no incontinence [Joint Stiffness] : no joint stiffness [Muscle Pain] : no muscle pain [Muscle Weakness] : no muscle weakness [Dizziness] : no dizziness [Easy Bleeding] : no tendency for easy bleeding [Easy Bruising] : no tendency for easy bruising [de-identified] : occasional

## 2024-12-02 NOTE — HISTORY OF PRESENT ILLNESS
[Disease: _____________________] : Disease: [unfilled] [T: ___] : T[unfilled] [N: ___] : N[unfilled] [M: ___] : M[unfilled] [de-identified] : This 66-year-old male was first seen in consultation on July 20, 2016. In August of 2013 his PSA was 4.3. In September 30, 2013 it was 4.9. He underwent a biopsy in March 5, 2014 revealing Ade 7 (3+4) disease. He underwent external beam radiation therapy for a total dose of 8100 cGy in 45 fractions from May 25 and total July 31 of 2014. He subsequently experienced PSA failure and brachytherapy was considered. A CT scan was performed as part of that evaluation revealing a filling defect in the left ureter. In March 2016, he presented to the emergency room with a creatinine of 13.7 and potassium of 8.9. Bilateral nephrostomy tubes were placed. He was found to have GI bleeding and a colonoscopy revealed evidence of radiation proctitis. He has been on Lupron. 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 His initial staging was stage II, [id number].    The original pathology was reviewed prior to radiation. The left base revealed adenocarcinoma the prostate, Ade score 7 (3+4) involving 2 out of 2 cores, 40% of each core, with perineural invasion. The left mid zone revealed adenocarcinoma the prostate, Ade score 7 and (4+3) involving 50% of each of 2 cores, with perineural invasion noted. The left apex had 3 of 3 cores involved. One core was Ade 7 (3+4) involving 100% of the core. The other 2 cores had a Ade 6 (3+3) in less than 5% of 2 cores. Perineural invasion was identified as well.  Posttreatment biopsy revealed the presence of adenocarcinoma within the prostate gland. The PSA was 11 at the time of the biopsy.  Feels well at initial consultation. Started Lupron in January 2016 before the renal failure/obstruction. No pains. Fatigue at times. Appetite good, stable weight. Lost weight with illness in March, typical weight prior was 210, left hospital at 170. Weight stable. He was transfused multiple times. Some urine through penis recently. Has bilateral percutaneous nephrostomies. No blood, dysuria, no incontinence. Nocturia x 2-3 at this time. Right sided nephrostomy still has output, small amount in left bag. Due for change of tubes in September. Had radiation proctitis and has laser treatment. Sees GI in follow-up next month.   10/18/16...Had a colonoscopy, showed radiation colitis. No further rectal blood noted. Urine flow mostly from PCNs, some from penis. No dysuria, some  hematuria noted at times. Appetite is normal. No weight loss. No fatigue. Hot flushes occur daily. .  4/18/17...last scans 9/26.  Saw PCP and had PSA done, shows slight increase over manjinder. he was given ferrous sulfate to take for anemia. No pains. Urine flow is mostly thru PCNs. Still has occasional blood in the urine, noted in the bag. Has less volume form the left side.  Hot flushes occur a few a day, 15-20 minutes, occ sweats. Appetite is good, no weight loss. Some fatigue noted. No edema. Occ blood with stools, saw Dr Grossman in September, due for follow up. has not had major bleeding recently.   8/1/17...Surya states that he is feeling good, his urine flow is strong, slight increase, no pains anywhere, he gets hot flashes a "few  day, they are coming." He has some minor fatigue, He has percutaneous nephrostomies, He follows with Dr. Dick, no obvious hematuria, no breast tenderness.  11/14/17...Received Lupron from Dr Dick a few weeks ago. PSA was 0.02 on 8/1/17. He gets hot flashes a couple times a day, He has b/l nephrostomies, is able to pass urine through his penis. He denies hematuria, dysuria He states appetite is good. He gets some fatigue, He denies any pains.  4/17/18...Received Lupron from Dr Dick Jan 23, 2018. Not seen since November 17, due to illness. 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 Feels well. No pains noted. Some fatigue. Appetite is good. Reports that he has diarrheal for 2-3 months, not daily. He had loose stools this AM, took Imodium. Diarrhea occurs 1-2 days a week, the n stops. Some flatus, no cramps. No blood in the stool. Urine flow is via nephrostomy, minor amount thru penis, no dysuria, no incontinence. No blood in urine. Hot flushes most days.    Minor fatigue. Appetite normal.   7/17/18...On CAB. Feels "okay". No pains. Has bilateral nephrostomy tubes, due for change in August. No recent infections, occasionally passes urine via penis, no blood, no dysuria. Still has hot flushes, daily, more in the winter. Some fatigue, no physical impairment. Appetite is good. Weight stable. Diarrhea persists, on and off, not daily. Watery at times. takes Imodium on occasion. Occ small amount of blood with hard stools, secondary to radiation proctitis.   10/23/18...Feels "okay". No pains. Urine flow is minimal, most comes out of the nephrostomy tubes, nocturia -none. No blood, no dysuria. No incontinence. Hot flushes occur, day and night. Appetite is good, no weight. No dyspepsia, no nausea, no vomiting. Diarrheal stools occur episodically, every 2-3 weeks. No blood in the stool. Usually 1-2 BMs per day. Occasionally notes blood on toiler paper with hard stool. has RT proctitis.   2/5/19...Feels well, no pains. has hot flushes frequently, daily. Has some sweats with them at night. Appetite is good, no weight loss. Urine flow is minimal thru penis, nocturia does not occur. Most urine form PCNs, last catheter change January 2019, by IR at [facility]. Occ minor blood in the urine, when the catheter is loose. No back pains, no bone pains. Occ fatigue.   5/7/19...Had a calcium of 11.1 last visit. Called him and told to stop calcium and vit D. Repeat 3 weeks later was normal. Continues on Lupron and Casodex. Feels well. Hot flushes every day. He has some fatigue, unchanged. Appetite is good, no weight loss. Urine is minimal from penis, has bilateral PCNs. No bone pains. No edema.   8/8/19...Feels "all right", as a URTI. No pains noted. Hot flushes multiple time a day. Fatigue is present, mild. Urine flow is 'good', PCN bilateral, most flow form the right. Some flow thru penis, voids 1-2 times a day. NO blood, no dysuria. Due for PCN change next week. Appetite is good, no weight change.  10/29/19...Local failure prostate cancer, on CAB. Feels well. No pains., Urine flow is good. Has bilateral PCNs. Most of urine goes to PCNs. Occ small amount of blood in the bag. Voids 2 times a day. Hot flushes, daily, with sweats on occasion. has fatigue at times. Appetite is good, no weight loss. NO leg edema. No bleeding from the RT proctitis recently  1/28/20...Stopped bicalutamide after last visit. Last seen 3 months ago. PSA on 1/7 was 0.27, thus continued to rise somewhat. NO bone pains. Hot flushes daily, occ sweats. Appetite is good, no weight loss. No fatigue of note. Urine flow is good, but most flow is into the PCNs. Last change of PCNs was December after he had some leaking on one side. No hematuria, no dysuria. No incontinence.   5/6/20...Verbal permission granted for telephone services by patient, Surya Cervantes, on 5/6/20 at 1:35 PM.   Fells well. No pains noted. Nephrostomy tubes in place, replaced last week. Appetite is good, no weight loss. Hot flushes daily. He has chronic fatigue complaints. No leg edema noted. Urine through penis is minimal, only when tubes become clogged. No blood in the urine. No nausea, no vomiting. Has diarrhea at times, once every 2-3 weeks. No back pains noted. No fevers, no chills., No cough, no sputum.   6/17/20...Verbal permission for telephone contact was granted by the patient, Surya Cervantes, on June 17, at 4 PM. Feels "all right". NO pains. Hot flushes, daily, occ minor sweats. Fatigue is present, rated mild, occasional naps. Appetite is good, no weight loss. No edema. Urine flow is minimal from penis, has bilateral PCNs. No blood. No incontinence. No recent infections. No cough NO CHEN. No F/C. Helps in care of his mother, watches TV, not very active. Jeana from [employer].   7/29/20...Verbal permission for telephone contact was granted by the patient, Surya Cervantes, on July 29, 2020 at 3:10 PM.  Started tawanda/pred on June 16, 2020. Feels  "pretty good". He was having diarrhea prior and he no longer had diarrhea.   No pains at this time. No edema. No headaches. Checks BP on occasion.  BPs have been "normal", he will check often. Urine flow "about the same",. Most urine comes from PCNs. No nocturia, no  hematuria, no dysuria. Appetite is normal, thinks he may have lost a few pounds. No cough, No CHEN. NO F/C. Hot flushes occur daily. No change. Mild fatigue.  8/26/20...On abiraterone and prednisone since June, PSA dropped significantly. taking meds correctly. Feels 'all right", no pains. Fatigue is present, as before, "not terrible", takes naps. Slightly more fatigue than before. Appetite is good, but lost weight. eats with his mother, she eats less and so does he. No nausea, no vomiting. No headaches, no edema of note. Most of urine comes from PCNs, Urine from penis is minimal, does not get up at night. No blood, no dysuria. No incontinence. No blood in the PCN bags.  Has leg cramps daily, particularly in the AM. Dr Angulo administers Lupron, due for Rx on 9/1/20.   9/22/20...He is seen in the office today in follow-up. He's been on abiraterone and prednisone since June. He had elevated transaminases on September 3 and his abiraterone was subsequently held. He will have repeat blood work done today after today's visit. He states that he feels "all right." His appetite is stated to be "all right." His weight has been stable. He denies dyspepsia, nausea or vomiting. He denies constipation or diarrheal stools. There is no cough or shortness of breath. He denies skin rashes or pruritus. There are no complaints of pains at this time. Fatigue is present but it does not impair his daily activities. He takes occasional naps during the day. He denies arthralgias or myalgias. There are no headaches or dizziness. He has bilateral percutaneous nephrostomy tubes draining yellow urine. There is no blood in the percutaneous nephrostomy bags. Most of the urine comes from the percutaneous nephrostomy tubes with minimal amount of urine coming out through the penis. There is no nocturia. There is no hematuria or dysuria. There is no incontinence. There is no edema in the extremities. He has occasional hot flashes but felt it has improved these past few days. He denies fevers or chills. He infrequently checks his blood pressure at home. He would get blood pressure readings ranging from 150s-160s systolic over 80s-90s diastolic. He remains independent in his activities of daily living.   10/27/20...Verbal permission for telephone services granted by the patient,  Surya Cervantes on October 27, 2020 at 3:48 PM.  Stared abiraterone in June. Feels "pretty good". NO increased edema. No headaches. Hot flushes are less. Appetite is good, no weight loss. Has PCNs, has urine form the penis at times. NO blood in the urine. No dysuria. No nocturia. No incontinence. No bone pains noted. Fatigue  RTs, takes a nap at times. Cares for his elderly mother at home.   11/25/20...Verbal permission for telephone services granted by the patient,  Surya Cervantes on November 25, 2020 at 1:50 PM On 1/2 dose tawanda due to transaminases. No pains, feels "all right". Some fatigue. Urine flow form penis in minimal, has bilateral PCNs. Occasional small amount of blood in the right PCN tube. Appetite is good, no weight loss. No cough, no CHEN. Hot flushes daily. No headaches, no edema. BP monitored at home, 151/89, pulse 95.   12/23/20...Verbal permission for telephone services granted by the patient,  Surya Cervantes on December 23, 2020 at  1:32 PM.  Feels well. No pains noted. Urine flow RTS. Has PCNs, minimal urine via penis on occasion. No blood in urine or tubes of note. Appetite is good, no weight loss. NO edema of the legs. NO headaches, no dizziness. No cough, no CHEN. No F/C. Hot flushes occur daily, less often. Due Lupron next month from Dr Angulo. Mild fatigue, take a nap. Independent in ADLs, cares for his mother. BP at home 138/90, pulse 71. Left arm 147/92  1/28/21...Verbal permission for telephone services granted by the patient,  Surya Cervantes on January 28, 2021 at 315 PM.  Surya reports that he has no pains.  There is no significant urine within his blood.  In the interim, he had to have the left percutaneous nephrostomy tube changed as there was pus present.  They had some difficulties apparently.  The tube was clogged and he was unable to change it over a guidewire.  He was not treated with any antibiotics.  He does have some urine from the penis at times.  There is no fevers or chills.  His appetite is good and there is no weight loss.  He reports that his weight is 190 pounds today.  He says his blood pressure was 146/92 in the left arm and 145/85 on the right arm.  The pulse rate was 75.  He reports no edema or headaches.  There are some hot flushes.  2/25/21...Verbal permission for telephone services granted by the patient,  Surya Cervantes on 2/25/21 at 3:10 PM Feels "all right". NO pains. He was having issues with PCNs clogged, had to have removal and reinsertion. It was the right side this week, done on Tuesday. Told to flush it daily rather than every 3 days. Appetite is good, no weight loss, no edema. No cough, no CHEN. BP is monitored, right side 139/84 today, left 134/82, pulse 79. No headaches, no significant fatigue, does nap on occasion. NO leg weakness. No N/V/D/C. Minimal urine from penis. There was more flow when the dysfunction occurred. NO blood in the urine, no dysuria.   4/7/21...  10/26/21....Last evaluation was 4/7/21, by telephone. Completed antibiotics. Was in rehab for 6 weeks. Strength is good, no pains. Appetite is good, gained some weight. urine flow is good, no incontinence, very little urine from penis, mostly from the tubes. No headaches, no dizziness, no balance issues, No falls. Hot flushes occur. Lives with mother, helps her. No edema at this time.   From neurosurgery, this summary of events....70 yo Male w/ PMHx of HTN, prostate CA (on chemo, s/p radiation), b/l nephrostomy tubes, presented  to [facility] ED on 6/14/21 with weakness and confusion. In ED foulurine in b/l nephrostomy bags noted, tachycardic, and hypotensive. s/p IVF and levo drip for septic shock, developed fluid overload with pitting edema and decreased  EF. Imaging of the spine concerning for discitis and osteomyelitis c-spine with T1 inflammation/signal changes.   Hospital coursed remarkable for BC + Klebsiella and strep anguinous. Started on vanco/zosyn 6/14. As per ID switched to ceftriaxone 2 g on 6/17. Hospitalization c/b ASHLEY on CKD with right hydronephrosis. IR consulted- no intervention, both nephrostomy tubes flushing adequately. Nephrology  consulted, started on stress dose steroids/hydrocortisone 50 mg q6 and Midodrine. CTAP showed Sludge in gallbladder.  Patient  underwent C6-7 corpectomy and posterior fusion C4-T2 on 6/29/21. Hospital course c/b tachycardia, fever with low O2 sats. Chest CT negative for  PE, lower extremity Dopplers negative. s/p PICC, discharged to inpatient rehab on 7/12/21 and then was discharged home after completion of rehab.   Last Eligard was August with Dr Angulo, 30 mg dose.   11/30/21...on tawanda/pred since June 2020. Feels  "all right". Has hot flushes and fatigue. Does not prevent activities. He falls asleep easily if he sits in a chair. Independent in ADLs, cares for his mother. No infections recently, No F/C. No recent adventures with with his PCNs. No blood in urine, occ urine via penis. Appetite is good, gained 2.2 kilos. Edema decreased. No chest pain/pressure. No cough/ CHEN. No headaches, no dizziness. No N/V/D/C.   1/11/22....telehealth today. Feels  "all right". No pains of note. t flushes daily. Has some fatigue as well. No edema noted. NO cough/CHEN No chest pain/pressure. No F/C. Appetite is good, no weight loss. Weighs 176. Checks BP at home, 142/98 RP 72 today, L arm, 142/91. Takes terazosin at night. Sees PCP in March. Needs to call PCP for tighter BP control. NO headaches, no dizziness. No F/C. No hematuria, has bilateral PCNS, has some small amount of urine from the penis. Had change of PCNs last week. Independent in ADLs.   2/8/22 - telehealth visit today. Continues on abiraterone 500mg daily + prednisone. Reports his BP today was 143/94 with HR of 74. Overall feeling "alright". Moderate fatigue, takes naps during the day. Occasional hot flashes. Appetite is good, weight today is 180lbs, he attributes recent weight gain to eating better after being discharged from the hospital. Mild ptosis of left eyelid comes and goes and has reportedly been stable for his "whole life". Bilat percutaneous nephrostomy tubes in place, no hematuria. Ongoing mild BLE edema is reportedly stable. Ongoing intermittent arthritis pains of left knee and right shoulder are stable. Denies fever, chills, headache, dizziness, balance issues, eye pain/problems, mucositis/odynophagia, chest pain, palpitations, SOB, cough, nausea/vomiting, diarrhea/constipation, abdominal pain, hematuria, rash/pruritus, neuropathy, bleeding, weakness, anxiety/depression.  3/17/22...tawanda/pred started mid June 2020. PSA was 11.6 at start. Feels "all right". No pains. No major issues with the PCNs, having a change done next week. Appetite is good, no weight loss. No cough, No CHEN. Some leg edema. No chest pain/pressure. Urine from penis is relatively little. No dysuria, no blood. has calcium oxalate excretion, which causes his tubes to be clogged. Notes some bruising. NO headaches, no dizziness, no balance issues. No F/C. No N/V/C, some minor diarrheal stools at times. Takes Imodium as as needed. Fatigue is present at times, has hot flushes occur multiple times a day.   4/20/22 - continues on abiraterone (500mg daily) + prednisone since June 2020 for mCRPC. Overall feeling "alright", ongoing mild fatigue is stable. Intermittent hot flashes are tolerable. Mild night sweats during the summer when it gets warmer. Ongoing mildly productive cough which he's had for "a long time". Occasional diarrhea that doesn't last more than a day. Bilateral percutaneous nephrostomy tubes in place, passing minimal urine from his penis. Occasional hematuria especially from right nephrostomy bag, clear today. Ongoing mild BLE edema. Ongoing chronic left knee swelling and pain 2/2 arthritis. Ongoing right shoulder pain 2/2 arthritis as well. Denies fever, chills, headache, dizziness, balance issues, eye pain/problems, mucositis/odynophagia, chest pain, palpitations, SOB, nausea/vomiting, constipation, abdominal pain, rash/pruritus, bleeding, muscle pain/weakness  5/24/22 - continues on abiraterone (500mg daily) + prednisone since June 2020 for mCRPC. BP today is 164/95, asymptomatic. BP at home has been 150s/80-90s. On occasion the DBP has been >100. Overall feeling "alright", ongoing fatigue is stable. Ongoing intermittent hot flashes. Appetite is good. Vision is changing in his left eye, he has an appt with [facility] this Thurs. Bilateral nephrostomy tubes in place, occasional hematuria at times resolves after 2-3 days. Ongoing trace edema of BLEs. Intermittent pruritus around nephrostomy tube dressings. Denies fever, chills, night sweats, headache, dizziness, balance issues, mucositis/odynophagia, chest pain, palpitations, SOB, cough, nausea/vomiting, diarrhea/constipation, abdominal pain, dysuria, incontinence,  rash, neuropathy, bleeding, muscle or joint pain/weakness.   6/21/22 - continues on abiraterone, half dose + prednisone since June 2020 for mCRPC. PSA 11.6 at start of treatment in June 2020, manjinder PSA at o.o5 August 2021, slow rise, recent PSA in May 2022 was 0.17.  feels  "all right". No pains noted. Has bilateral PCNs, changed last week. No infections, no F/C. Some fatigue, always, no worse. Appetite is good, no weight loss. NO headaches, no dizziness, no balance issues. Some edema noted, as before. No chest pain/pressure. NO bone pains. No N/V/D/C. Small amounts of urine via the penis. Hot flushes daily, multiple times. No cough, no CHEN.   7/28/22 - continues on abiraterone 500mg + prednisone since June 2020 for mCRPC. Overall feeling "alright", ongoing mild fatigue for which he occasionally takes a nap once a day. Ongoing hot flashes are tolerable. Appetite is good. Occasional cough. Bilateral percutaneous nephrostomy tubes in place passing most of the urine through the bags but still passing minimal urine via penis, no nocturia. Trace edema of BLEs. Notes intermittent cramping of hands which may be arthritis as it improves with movement. Has ongoing intermittent right buttock pain radiating down to the knee, present for the past year, pain is worse with sitting for a long time, improves with changing positions, max pain is 3/10 not requiring medication. Denies fever, chills, night sweats, headache, dizziness, balance issues, eye pain/problems, mucositis/odynophagia, chest pain, palpitations, SOB, nausea/vomiting, diarrhea/constipation, abdominal pain, dysuria, hematuria, incontinence, rash/pruritus, bleeding, weakness.   10/11/22 - continues on abiraterone 500mg daily + prednisone since June 2020 for mCRPC. Neurosurgical notes reviewed...."S/P cervical spinal fusion, 70 year old male 15 months pos op C6-7 corpectomy and posterior fusion C4-T2 on 6/29/21 with Dr. Tiffanie Martinez". Now seeing Dr Resendiz. Overall, feels "all right". No pains noted. urine flow is "fine", no incontinence, most urine still out PCNs, some from the penis. No blood, no dysuria. Hot flushes daily.  Some fatigue, RTS. Appetite is good, no weight loss. No headaches, dizziness, no balance issues, No paresthesias. Occ cough, sputum is white. No CHEN. Tubes to be changed next week. No N/V/D/C.  Some edema , improved. No chest pain/pressure  11/9/22 - continues on abiraterone 500mg daily + prednisone since June 2020 for mCRPC which he is tolerating well. Pt reports feeling generally well. No new complaints. Bilateral nephrostomy tubes in place, denies hematuria . Ongoing trace edema of BLEs. Intermittent pruritus around nephrostomy tube dressings. Denies fever, chills, night sweats, headache, dizziness, balance issues, mucositis/odynophagia, chest pain, palpitations, SOB, cough, nausea/vomiting, diarrhea/constipation, abdominal pain, dysuria, incontinence, rash, neuropathy, bleeding, muscle or joint pain/weakness. Appetite reported as good. No changes in medications  12/7/22 - continues on abiraterone 500mg daily + prednisone since June 2020 for mCRPC.  gets his Eligard 45 tomorrow form Dr Angulo. feels "all right". No pains noted except for arthrtic complaints. Appetite is okay< dropped a few pounds. usual edema, no change. No headaches, NO dizziness. Most of the urine comes from PCNs, changed every 6 weeks due to prior infections. NO fevers, no chills. Hot flushes occur, 1-2 times a day. Notes a bit more urine thru penis lately. Occ cough, no CHEN. No chest pain/pressure/palpitations. NO N/V/D/C. Mild fatigue. Independent in ADLs.   1/4/23 - continues on abiraterone 500mg daily + prednisone since June 2020 for mCRPC. Overall feeling "alright", ongoing fatigue is stable. Ongoing hot flashes are tolerable. Appetite is "ok". Ongoing occasional productive cough is stable. Bilateral perc nephrostomy tubes in place, continues to pass some urine through the penis, no nocturia. Denies fever, chills, night sweats,headache, dizziness, balance issues, eye pain/problems, mucositis/odynophagia, chest pain, palpitations, SOB, nausea/vomiting, diarrhea/constipation, abdominal pain, dysuria, hematuria, incontinence, LE edema, rash/pruritus, bleeding, muscle or joint pain/weakness.   2/13/23..... continues on abiraterone 500mg daily + prednisone since June 2020 for mCRPC. treated with cephalexin and developed rash, red skin, pruritus. Chart labeled as allergy. Hospitalization was brief. sudden onset of symptoms, Hd diarrheal stools as well. Feels  "pretty good", except for residual pruritus. No pains noted.  Has tube change set for next week. Appetite has retuned. No N/V. Diarrhea resolved, NO taste alteration. No headaches, no dizziness. had some edema. resolved. No chest pain/pressure/palpations.  has his usual fatigue and hot flushes. Cough, chronic, with clear sputum. No CHEN/SOB. No fevers of chills since discharge  Hospital Course:  Discharge Date	01-Feb-2023  Admission Date	29-Jan-2023 17:52  Reason for Admission	Syncope  Hospital Course	  71 yo M w/ HTN, gout, prostate cancer s/p b/l nephrostomy tubes, on abiraterone, admitted w/ sepsis 2/2 UTI, improved on antibiotics.  Sepsis secondary to UTI.  was febrile, tachycardic in the ED, met criteria for sepsis on admission  - U/A w/ +nitrite, large LE, prior cultures with klebsiella and E. coli  - current Ucx >3 organisms suggestive of collection contamination  - received IV zosyn (1/29/23-2/1/23),  Bcx negative, discharge on oral cephalexin to complete 10 days for complicated UTI (end date 2/7/23)  - sepsis resolved (currently afebrile, without leukocytosis, tachycardia resolved)  - IR consulted nephrostomy tubes in position and draining well, no indication for exchange at this time.   Gastroenteritis.   had reported nausea/vomiting/diarrhea after eating fried rice w/ beef  - s/p IV fluids, now resolved.   Syncope.   reports syncopal episode likely 2/2 dehydration from gastroenteritis  - s/p IV fluids, EKG sinus rhythm, troponin indeterminate but no increased  - reports symptoms resolved, ambulating around unit independently (advised call don't fall).   3/21/23..... continues on abiraterone 500mg daily + prednisone since June 2020 for mCRPC. treated with cephalexin and developed rash, red skin, pruritus. Chart labeled as allergy. Overall, he feels "all right". No pains of note. Appetite is good, no weight loss. No edema. No chest pain/pressure/palpitations. some cough, no CHEN. Hot flushes daily. Fatigue is present.  Does yoga, runs in place. No HA, no dizziness, no falls. Independent in ADLs. no fevers, no chills. Minimal urine from penis, no dysuria. PCNs are changed every 6 weeks, no blood in tubes. No N/V/D/C.   4/17/23.....  on abiraterone 500mg and prednisone since June 2020 for mCRPC. Prior transaminitis led to decrease in dose. "doing okay". Minor discomfort on left side near the PCN. Has change every 6 weeks at this time. Appetite is good, no weight loss. No N/V/D/C. Has some urine form the penis, noted if PCNs pinch. No blood in the urine, no hematuria. Hot flushes as before, about 3 times a day.  fatigue RTS. No cough, no CHEN, No fevers, no chills. No edema, no chest pains,pressure/palpitations  Exercises every AM.   5/16/23....continues on abiraterone 500mg and prednisone since June 2020 for mCRPC. Prior transaminitis led to decrease in dose. Eligard next month with Dr Angulo. Overall, feels "all right". No pains noted. Appetite is good, no weight loss. No N/V/D/C. Urine from penis on occasion, empties bladder before bed. No hematuria, no dysuria. No fevers, no chills. PCNs were changed last week. No headaches, no dizziness, no balance issues.  Exercises daily, walking and running. Occ cough, no CHEN. No chest pain/pressure/palpitations. Hot flushes remains. No fevers, no chills  6/12/23.... on abiraterone 500mg and prednisone since June 2020 for mCRPC. Prior transaminitis led to decrease in dose. Mara with Dr Angulo. Feels "well". NO pains, except for radicular pain in right thigh, present for about 6 weeks, constant. uses a topical, icy-hot. Does not awaken him, better when he is lying down, aggravated by ambulation. Appetite is normal. No weight loss. NO fevers, no chills. NO fatigue. No headaches, no dizziness, no paresthesias. No cane. No falls. No cough, no CHEN. NO N/V/D/C. Urine from PCNs. Some from the penis, no hematuria, no dysuria. No nocturia. Hot flushes daily. No edema. NO chest pain/pressure/palpitations. No back pains. Independent in ADLs  7/18/23 - continues on abiraterone + prednisone (500mg daily d/t transaminitis) for mCRPC since June 2020. Ongoing fatigue after periods activity, naps once a day. Ongoing hot flashes are tolerable. Appetite is good. Occasional cough. Bilateral perc nephrostomy tubes in place, scant blood if the tubing moves around but not persistent. Ongoing pain in right buttock radiating down posterior thigh/leg, no pain with lying down, pain is worse with sitting for a prolonged time and improves with walking, max pain is 8/10. Not taking any pain medication as the pain eases up when he gets up and walks around.   8/16/23 - continues on abiraterone + prednisone (500mg daily d/t transaminitis) for mCRPC since June 2020. Overall feeling "alright", ongoing fatigue is stable. Ongoing frequent hot flashes, tolerable. Appetite is "alright". Ongoing occasional cough. Perc nephrostomy tubes exchanged 2wks ago, occasional transient hematuria which he attributes to accidentally pulling on the tubes. Ongoing stable intermittent right buttock pain radiating down posterior thigh down to the calf, improves with movement and worse with sitting for a long time, max pain is 5/10. Using a topical cream called "Australian Dream" which has been helpful.   9/20/23 - continues on abiraterone + prednisone (500mg daily d/t transaminitis) for mCRPC since June 2020. last PCN change was last week. On a 6 week change protocol. Feels "not bad", chronic leg pains, form right buttock, down the leg. Passing urine via penis, very small amounts. No blood, no dysuria. No nocturia. No incontinence, voids about 2 times a day. Hot flushes, every day, no sweats. Fatigue is present, naps, feels refreshed afterwards. Does yoga, stretching, walking, lifts some weights, daily basis. Appetite is good, no weight Kenji Occ cough, no CHEN. No edema, no chest pain/pressure.   10/31/23 - continues on abiraterone + prednisone (500mg daily d/t transaminitis) for mCRPC since June 2020. Overall feeling "alright", ongoing fatigue is stable. Occasional hot flashes. Appetite is "alright". Perc nephrostomy tubes in place, passing minimal urine through his penis. Ongoing pain in right buttock that radiates down the posterior leg, worse in the morning when first waking up and worse with sitting for a prolonged period, improves/resolves with activity. Denies fever, chills, night sweats, headache, dizziness, balance issues, chest pain, palpitations, SOB, cough, nausea/vomiting, diarrhea/constipation, abdominal pain, dysuria, hematuria, incontinence, LE edema, bleeding.   12/5/23 - continues on abiraterone + prednisone (500mg daily d/t transaminitis) for mCRPC since June 2020. Overall feeling well, some fatigue but remains active and exercising regularly. Ongoing hot flashes are tolerable. Appetite is good. Bilateral percutaneous nephrostomy tubes in place, no hematuria. Ongoing right buttock pain that radiates down the posterior leg, worse in the morning and improves with activity, max pain is 4-5/10, declines referral to PM&R. Denies fever, chills, night sweats, headache, dizziness, balance issues, chest pain, palpitations, SOB, cough, nausea/vomiting, diarrhea/constipation, abdominal pain, LE edema, bleeding.  1/2/24 - on abiraterone + prednisone since June 2020, discontinued 12/26/23 for POD seen on 12/21/23 bone scan. Feeling well, no significant fatigue. Remains active and doing exercises daily. Occasional hot flashes are tolerable. Bilateral percutaneous nephrostomy tubes in place, no hematuria. Ongoing right buttock pain that radiates down the posterior leg, worse in the morning and improves with activity, max pain is 7-8/10, pain is stable. Denies fever, chills, night sweats, headache, dizziness, balance issues, chest pain, palpitations, SOB, cough, nausea/vomiting, diarrhea/constipation, abdominal pain, LE edema, bleeding.   2/14/24 - Xtandi started early Jan 2024 for mCRPC. Over the past week he's been very fatigued. Ongoing hot flashes. Appetite is decreased and not eating as much as he used to, weight is down 11lbs over the past 6wks. Stable cough since before start of Xtandi. Percutaneous nephrostomy tubes in place, no hematuria. Notes some pains in the hands, knees, and ankles which he attributes to arthritis. Ongoing pain in right buttock radiating down posterior leg, pain is worse in the AM when first waking up and improves with activity/walking, pain waxes and wanes, max pain is 7/10 but not taking any medication for this. Denies fever, chills, night sweats, headache, dizziness, balance issues, chest pain, palpitations, SOB, nausea/vomiting, diarrhea/constipation, abdominal pain, LE edema, bleeding.   3/14/24 - continues on Xtandi since early Jan 2024 for mCRPC. Overall feeling ok, fatigue is a little improved which he believes is because his BP hasn't been as low as it was in the past. BP at home has been 120's/70-80's in the mornings. Ongoing hot flashes are not as intense. Appetite is decreased, weight is down 7lbs over the past month, but overall down 18lbs over 2 months. Urine from bilateral percutaneous nephrostomy tubes is "good", no hematuria. Ongoing pain in right buttock down the posterior leg, pain is worse with sitting for a prolonged period, improves with getting up and moving around, not interfering with his sleep, not needing any medication for pain. Denies fever, chills, night sweats, headache, dizziness, balance issues, chest pain, palpitations, SOB, cough, nausea/vomiting, diarrhea/constipation, abdominal pain, LE edema, bleeding.  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 NO AAW benefit. Started tawanda/pred mid June 2020, discontinued 12/26/23 for disease progression. Xtandi started early Jan 2024, decreased to 80mg daily in May 2024 d/t weight loss.  [de-identified] : 4/16/24 - continues on Xtandi since early Jan 2024 for mCRPC. Excess lambda light chains on last blood work, no M-spike. Feels well. no Pains. Has to some fatigue, naps during the day. Appetite is "good", No N/V/D/C. No cough, no CHEN. NO chest pain/pressure. No headaches, no dizziness. No balance issues, minimal, walks with a cane. No falls. Urine  flos via tubes, no recent infections. NO blood in urine. Some minor urine amounts from penis. No nocturia, no dysuria. Hot flushes occur, less than before, 1-2 times a day. No edema noted. Independent in ADLs.  CDX done, no targets  5/15/24 - continues on Xtandi since early Jan 2024 for mCRPC. Feeling "alright". Ongoing fatigue, naps 1-2x/day. Occasional hot flashes. Decreased appetite, not eating as much as before, drinking 1-2 Ensure per day, weight is down 5lbs over the past month. Bilateral percutaneous nephrostomy tubes in place, no hematuria. Ongoing arthritic pains of the shoulders and hands at times. Denies fever, chills, night sweats, headache, dizziness, balance issues, chest pain, palpitations, SOB, cough, nausea/vomiting, diarrhea/constipation, abdominal pain, LE edema, bleeding.   6/12/24 - continues on Xtandi since early Jan 2024 for mCRPC, decreased to 80mg daily as of May 2024 for weight loss. Ongoing fatigue is stable. Ongoing hot flashes. Appetite is "a little better". Urine from nephrostomy tubes is clear yellow, no hematuria. Denies fever, chills, night sweats, headache, dizziness, balance issues, chest pain, palpitations, SOB, cough, nausea/vomiting, diarrhea/constipation, abdominal pain, LE edema, bleeding, muscle or joint pain/weakness.  7/11/24 - on Xtandi since early Jan 2024 for mCRPC, decreased to 80mg daily as of May 2024 for weight loss. Patient accompanied by brother, reports to be feeling well, appetite slowly improving, continues to have nephrostomy tubes in places, last changed 23 weeks ago draining clear yellow urine. Denies fever, chills, headaches, chest pain, sob, abdominal pain/back pain.   8/14/24 - on Xtandi since early Jan 2024 for mCRPC, decreased to 80mg daily as of May 2024 for weight loss. Overall feeling well, ongoing fatigue is stable. Ongoing hot flashes.  Appetite is stable, weight is up a couple lbs since last visit. Diarrhea yesterday "all day" with >6-7 episodes of soft/loose stool, he took PRN Imodium yesterday, no more stool today, unsure if it was r/t something he ate. Urine from nephrostomy tubes is "good", no hematuria. Denies fever, chills, night sweats, headache, dizziness, chest pain, palpitations, SOB, cough, nausea/vomiting, constipation, abdominal pain, LE edema, bleeding, muscle or joint pain/weakness.  9/17/24 - on Xtandi since early Jan 2024 for mCRPC, decreased to 80mg daily as of May 2024 for weight loss. Feeling "alright", stable fatigue. Ongoing hot flashes are tolerable. Appetite is stable. Having intermittent diarrhea approx every other week with approx 3 episodes when it occurs but resolves with PRN Imodium 1-2 tabs, having normal BMs in between, unsure if diarrhea is r/t eating spinach. Bilateral nephrostomy tubes in place. Denies fever, chills, night sweats, headache, dizziness, chest pain, palpitations, SOB, cough, nausea/vomiting, constipation, abdominal pain, hematuria, LE edema, bleeding, muscle or joint pain/weakness.  10/16/24 - on Xtandi since early Jan 2024 for mCRPC, decreased to 80mg daily as of May 2024 for weight loss. Ongoing fatigue is stable. Ongoing hot flashes are tolerable. Appetite is "alright". Occasional diarrhea is improved, only 2 episodes of diarrhea over the past month. Bilateral percutaneous nephrostomy tubes in place, still passes some urine through his penis. Ongoing intermittent arthritic pains of the shoulders and knees are stable since before cancer diagnosis. Denies fever, chills, night sweats, headache, dizziness, chest pain, palpitations, SOB, cough, nausea/vomiting, constipation, abdominal pain, dysuria, hematuria, incontinence, LE edema, bleeding.   11/14/24: presents for follow up and management of metastatic CRPC on ADT + reduced dose enzalutamide in setting of weight loss and fatigue. His PSA has been noted to be rising over the last several visits from a manjinder of 8.13 to 15.3 at last visit on 10/16/24.   12/2/24: He presents for follow up and cycle 1 of docetaxel 75mg/m2 after his PSA was noted to rise further from 15.3 to 21.4. He has stopped enzalutamide.  He denies any new significant complaints since last visit.

## 2024-12-15 NOTE — HISTORY OF PRESENT ILLNESS
[FreeTextEntry1] : Patient is a 71 year old man comes in today for a history of metastatic prostate cancer recently started on Docetaxel chemotherapy. Brother is present at visit today.   Bilateral nephrostomy tube due for exchange at the end of December 2024. Urine remains clear.  PSA 12/2/2024 28.7 ng/mL PSA 11/14/2024 21.4 ng/mL PSA 10/16/2024 15.3 ng/mL

## 2024-12-25 NOTE — ASSESSMENT
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 He started abiraterone + prednisone in June 2020, dose was reduced to 500mg d/t transaminitis. Abiraterone discontinued late December 2023 for disease progression. Xtandi started Jan 2024.  He experienced transient benefit but appears to have progressing disease at this time as manifested by steadily rising PSA since 6/2024 and new lesions noted on CT imaging performed 10/30/24.   We discussed these results and that he will need an alternative therapy to control his disease. Standard of care options include taxane chemotherapy and Pluvicto. Given further increase in PSA to 21.4, we discussed the potential risks and benefits associated with docetaxel. He was amenable and signed informed consent.   He will proceed with cycle 2 docetaxel 75mg/m2 and return in 3 weeks for C3.     mCRPC: now s/p POD on tawanda/pred ->enzalutamide.  Now on docetaxel.  - Feb 2024 [facility] CDx was negative for any actionable mutations, MSI high not detected, TMB 8 muts/Mb, ctDNA <1%.  - Continue Eligard q6mo inj with urology, given 12/12/24, next scheduled for 6/2025 - Proceed with cycle 2 docetaxel 75mg/m2  Bones: - He is off Ca + D supplementation d/t hypercalcemia.  - Monthly Xgeva inj started 5/15/24,  Instructed to contact our office with any new/worsening symptoms. Pt and family member educated regarding plan of care, all questions/concerns addressed to the best of my abilities and their apparent satisfaction.  [AdvancecareDate] : 1/2/24 [FreeTextEntry2] : Health care proxy form provided to pt 12/5/23, he still has at home. Instructed once again to bring the completed form to his next office visit.

## 2024-12-25 NOTE — REVIEW OF SYSTEMS
[Fever] : no fever [Chills] : no chills [Night Sweats] : no night sweats [Chest Pain] : no chest pain [Palpitations] : no palpitations [Lower Ext Edema] : no lower extremity edema [Shortness Of Breath] : no shortness of breath [Cough] : no cough [Abdominal Pain] : no abdominal pain [Vomiting] : no vomiting [Constipation] : no constipation [Dysuria] : no dysuria [Incontinence] : no incontinence [Joint Stiffness] : no joint stiffness [Muscle Pain] : no muscle pain [Muscle Weakness] : no muscle weakness [Dizziness] : no dizziness [Easy Bleeding] : no tendency for easy bleeding [Easy Bruising] : no tendency for easy bruising [de-identified] : occasional

## 2024-12-25 NOTE — REVIEW OF SYSTEMS
[Fever] : no fever [Chills] : no chills [Night Sweats] : no night sweats [Chest Pain] : no chest pain [Palpitations] : no palpitations [Lower Ext Edema] : no lower extremity edema [Shortness Of Breath] : no shortness of breath [Cough] : no cough [Abdominal Pain] : no abdominal pain [Vomiting] : no vomiting [Constipation] : no constipation [Dysuria] : no dysuria [Incontinence] : no incontinence [Joint Stiffness] : no joint stiffness [Muscle Pain] : no muscle pain [Muscle Weakness] : no muscle weakness [Dizziness] : no dizziness [Easy Bleeding] : no tendency for easy bleeding [Easy Bruising] : no tendency for easy bruising [de-identified] : occasional

## 2024-12-25 NOTE — HISTORY OF PRESENT ILLNESS
[de-identified] : This 66-year-old male was first seen in consultation on July 20, 2016. In August of 2013 his PSA was 4.3. In September 30, 2013 it was 4.9. He underwent a biopsy in March 5, 2014 revealing Ade 7 (3+4) disease. He underwent external beam radiation therapy for a total dose of 8100 cGy in 45 fractions from May 25 and total July 31 of 2014. He subsequently experienced PSA failure and brachytherapy was considered. A CT scan was performed as part of that evaluation revealing a filling defect in the left ureter. In March 2016, he presented to the emergency room with a creatinine of 13.7 and potassium of 8.9. Bilateral nephrostomy tubes were placed. He was found to have GI bleeding and a colonoscopy revealed evidence of radiation proctitis. He has been on Lupron. 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 His initial staging was stage II, [id number].    The original pathology was reviewed prior to radiation. The left base revealed adenocarcinoma the prostate, Ade score 7 (3+4) involving 2 out of 2 cores, 40% of each core, with perineural invasion. The left mid zone revealed adenocarcinoma the prostate, Ade score 7 and (4+3) involving 50% of each of 2 cores, with perineural invasion noted. The left apex had 3 of 3 cores involved. One core was Ade 7 (3+4) involving 100% of the core. The other 2 cores had a Ade 6 (3+3) in less than 5% of 2 cores. Perineural invasion was identified as well.  Posttreatment biopsy revealed the presence of adenocarcinoma within the prostate gland. The PSA was 11 at the time of the biopsy.  Feels well at initial consultation. Started Lupron in January 2016 before the renal failure/obstruction. No pains. Fatigue at times. Appetite good, stable weight. Lost weight with illness in March, typical weight prior was 210, left hospital at 170. Weight stable. He was transfused multiple times. Some urine through penis recently. Has bilateral percutaneous nephrostomies. No blood, dysuria, no incontinence. Nocturia x 2-3 at this time. Right sided nephrostomy still has output, small amount in left bag. Due for change of tubes in September. Had radiation proctitis and has laser treatment. Sees GI in follow-up next month.   10/18/16...Had a colonoscopy, showed radiation colitis. No further rectal blood noted. Urine flow mostly from PCNs, some from penis. No dysuria, some  hematuria noted at times. Appetite is normal. No weight loss. No fatigue. Hot flushes occur daily. .  4/18/17...last scans 9/26.  Saw PCP and had PSA done, shows slight increase over manjinder. he was given ferrous sulfate to take for anemia. No pains. Urine flow is mostly thru PCNs. Still has occasional blood in the urine, noted in the bag. Has less volume form the left side.  Hot flushes occur a few a day, 15-20 minutes, occ sweats. Appetite is good, no weight loss. Some fatigue noted. No edema. Occ blood with stools, saw Dr Grossman in September, due for follow up. has not had major bleeding recently.   8/1/17...Surya states that he is feeling good, his urine flow is strong, slight increase, no pains anywhere, he gets hot flashes a "few  day, they are coming." He has some minor fatigue, He has percutaneous nephrostomies, He follows with Dr. Dick, no obvious hematuria, no breast tenderness.  11/14/17...Received Lupron from Dr Dick a few weeks ago. PSA was 0.02 on 8/1/17. He gets hot flashes a couple times a day, He has b/l nephrostomies, is able to pass urine through his penis. He denies hematuria, dysuria He states appetite is good. He gets some fatigue, He denies any pains.  4/17/18...Received Lupron from Dr Dick Jan 23, 2018. Not seen since November 17, due to illness. 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 Feels well. No pains noted. Some fatigue. Appetite is good. Reports that he has diarrheal for 2-3 months, not daily. He had loose stools this AM, took Imodium. Diarrhea occurs 1-2 days a week, the n stops. Some flatus, no cramps. No blood in the stool. Urine flow is via nephrostomy, minor amount thru penis, no dysuria, no incontinence. No blood in urine. Hot flushes most days.    Minor fatigue. Appetite normal.   7/17/18...On CAB. Feels "okay". No pains. Has bilateral nephrostomy tubes, due for change in August. No recent infections, occasionally passes urine via penis, no blood, no dysuria. Still has hot flushes, daily, more in the winter. Some fatigue, no physical impairment. Appetite is good. Weight stable. Diarrhea persists, on and off, not daily. Watery at times. takes Imodium on occasion. Occ small amount of blood with hard stools, secondary to radiation proctitis.   10/23/18...Feels "okay". No pains. Urine flow is minimal, most comes out of the nephrostomy tubes, nocturia -none. No blood, no dysuria. No incontinence. Hot flushes occur, day and night. Appetite is good, no weight. No dyspepsia, no nausea, no vomiting. Diarrheal stools occur episodically, every 2-3 weeks. No blood in the stool. Usually 1-2 BMs per day. Occasionally notes blood on toiler paper with hard stool. has RT proctitis.   2/5/19...Feels well, no pains. has hot flushes frequently, daily. Has some sweats with them at night. Appetite is good, no weight loss. Urine flow is minimal thru penis, nocturia does not occur. Most urine form PCNs, last catheter change January 2019, by IR at [facility]. Occ minor blood in the urine, when the catheter is loose. No back pains, no bone pains. Occ fatigue.   5/7/19...Had a calcium of 11.1 last visit. Called him and told to stop calcium and vit D. Repeat 3 weeks later was normal. Continues on Lupron and Casodex. Feels well. Hot flushes every day. He has some fatigue, unchanged. Appetite is good, no weight loss. Urine is minimal from penis, has bilateral PCNs. No bone pains. No edema.   8/8/19...Feels "all right", as a URTI. No pains noted. Hot flushes multiple time a day. Fatigue is present, mild. Urine flow is 'good', PCN bilateral, most flow form the right. Some flow thru penis, voids 1-2 times a day. NO blood, no dysuria. Due for PCN change next week. Appetite is good, no weight change.  10/29/19...Local failure prostate cancer, on CAB. Feels well. No pains., Urine flow is good. Has bilateral PCNs. Most of urine goes to PCNs. Occ small amount of blood in the bag. Voids 2 times a day. Hot flushes, daily, with sweats on occasion. has fatigue at times. Appetite is good, no weight loss. NO leg edema. No bleeding from the RT proctitis recently  1/28/20...Stopped bicalutamide after last visit. Last seen 3 months ago. PSA on 1/7 was 0.27, thus continued to rise somewhat. NO bone pains. Hot flushes daily, occ sweats. Appetite is good, no weight loss. No fatigue of note. Urine flow is good, but most flow is into the PCNs. Last change of PCNs was December after he had some leaking on one side. No hematuria, no dysuria. No incontinence.   5/6/20...Verbal permission granted for telephone services by patient, Surya Cervantes, on 5/6/20 at 1:35 PM.   Fells well. No pains noted. Nephrostomy tubes in place, replaced last week. Appetite is good, no weight loss. Hot flushes daily. He has chronic fatigue complaints. No leg edema noted. Urine through penis is minimal, only when tubes become clogged. No blood in the urine. No nausea, no vomiting. Has diarrhea at times, once every 2-3 weeks. No back pains noted. No fevers, no chills., No cough, no sputum.   6/17/20...Verbal permission for telephone contact was granted by the patient, Surya Cervantes, on June 17, at 4 PM. Feels "all right". NO pains. Hot flushes, daily, occ minor sweats. Fatigue is present, rated mild, occasional naps. Appetite is good, no weight loss. No edema. Urine flow is minimal from penis, has bilateral PCNs. No blood. No incontinence. No recent infections. No cough NO CHEN. No F/C. Helps in care of his mother, watches TV, not very active. Jeana from [place] [facility].   7/29/20...Verbal permission for telephone contact was granted by the patient, Surya Cervantes, on July 29, 2020 at 3:10 PM.  Started tawanda/pred on June 16, 2020. Feels  "pretty good". He was having diarrhea prior and he no longer had diarrhea.   No pains at this time. No edema. No headaches. Checks BP on occasion.  BPs have been "normal", he will check often. Urine flow "about the same",. Most urine comes from PCNs. No nocturia, no  hematuria, no dysuria. Appetite is normal, thinks he may have lost a few pounds. No cough, No CHEN. NO F/C. Hot flushes occur daily. No change. Mild fatigue.  8/26/20...On abiraterone and prednisone since June, PSA dropped significantly. taking meds correctly. Feels 'all right", no pains. Fatigue is present, as before, "not terrible", takes naps. Slightly more fatigue than before. Appetite is good, but lost weight. eats with his mother, she eats less and so does he. No nausea, no vomiting. No headaches, no edema of note. Most of urine comes from PCNs, Urine from penis is minimal, does not get up at night. No blood, no dysuria. No incontinence. No blood in the PCN bags.  Has leg cramps daily, particularly in the AM. Dr Angulo administers Lupron, due for Rx on 9/1/20.   9/22/20...He is seen in the office today in follow-up. He's been on abiraterone and prednisone since June. He had elevated transaminases on September 3 and his abiraterone was subsequently held. He will have repeat blood work done today after today's visit. He states that he feels "all right." His appetite is stated to be "all right." His weight has been stable. He denies dyspepsia, nausea or vomiting. He denies constipation or diarrheal stools. There is no cough or shortness of breath. He denies skin rashes or pruritus. There are no complaints of pains at this time. Fatigue is present but it does not impair his daily activities. He takes occasional naps during the day. He denies arthralgias or myalgias. There are no headaches or dizziness. He has bilateral percutaneous nephrostomy tubes draining yellow urine. There is no blood in the percutaneous nephrostomy bags. Most of the urine comes from the percutaneous nephrostomy tubes with minimal amount of urine coming out through the penis. There is no nocturia. There is no hematuria or dysuria. There is no incontinence. There is no edema in the extremities. He has occasional hot flashes but felt it has improved these past few days. He denies fevers or chills. He infrequently checks his blood pressure at home. He would get blood pressure readings ranging from 150s-160s systolic over 80s-90s diastolic. He remains independent in his activities of daily living.   10/27/20...Verbal permission for telephone services granted by the patient,  Surya Cervantes on October 27, 2020 at 3:48 PM.  Stared abiraterone in June. Feels "pretty good". NO increased edema. No headaches. Hot flushes are less. Appetite is good, no weight loss. Has PCNs, has urine form the penis at times. NO blood in the urine. No dysuria. No nocturia. No incontinence. No bone pains noted. Fatigue  RTs, takes a nap at times. Cares for his elderly mother at home.   11/25/20...Verbal permission for telephone services granted by the patient,  Surya Cervantes on November 25, 2020 at 1:50 PM On 1/2 dose tawanda due to transaminases. No pains, feels "all right". Some fatigue. Urine flow form penis in minimal, has bilateral PCNs. Occasional small amount of blood in the right PCN tube. Appetite is good, no weight loss. No cough, no CHEN. Hot flushes daily. No headaches, no edema. BP monitored at home, 151/89, pulse 95.   12/23/20...Verbal permission for telephone services granted by the patient,  Surya Cervantes on December 23, 2020 at  1:32 PM.  Feels well. No pains noted. Urine flow RTS. Has PCNs, minimal urine via penis on occasion. No blood in urine or tubes of note. Appetite is good, no weight loss. NO edema of the legs. NO headaches, no dizziness. No cough, no CHEN. No F/C. Hot flushes occur daily, less often. Due Lupron next month from Dr Angulo. Mild fatigue, take a nap. Independent in ADLs, cares for his mother. BP at home 138/90, pulse 71. Left arm 147/92  1/28/21...Verbal permission for telephone services granted by the patient,  Surya Cervantes on January 28, 2021 at 315 PM.  Surya reports that he has no pains.  There is no significant urine within his blood.  In the interim, he had to have the left percutaneous nephrostomy tube changed as there was pus present.  They had some difficulties apparently.  The tube was clogged and he was unable to change it over a guidewire.  He was not treated with any antibiotics.  He does have some urine from the penis at times.  There is no fevers or chills.  His appetite is good and there is no weight loss.  He reports that his weight is 190 pounds today.  He says his blood pressure was 146/92 in the left arm and 145/85 on the right arm.  The pulse rate was 75.  He reports no edema or headaches.  There are some hot flushes.  2/25/21...Verbal permission for telephone services granted by the patient,  Surya Cervantes on 2/25/21 at 3:10 PM Feels "all right". NO pains. He was having issues with PCNs clogged, had to have removal and reinsertion. It was the right side this week, done on Tuesday. Told to flush it daily rather than every 3 days. Appetite is good, no weight loss, no edema. No cough, no CHEN. BP is monitored, right side 139/84 today, left 134/82, pulse 79. No headaches, no significant fatigue, does nap on occasion. NO leg weakness. No N/V/D/C. Minimal urine from penis. There was more flow when the dysfunction occurred. NO blood in the urine, no dysuria.   4/7/21...  10/26/21....Last evaluation was 4/7/21, by telephone. Completed antibiotics. Was in rehab for 6 weeks. Strength is good, no pains. Appetite is good, gained some weight. urine flow is good, no incontinence, very little urine from penis, mostly from the tubes. No headaches, no dizziness, no balance issues, No falls. Hot flushes occur. Lives with mother, helps her. No edema at this time.   From neurosurgery, this summary of events....68 yo Male w/ PMHx of HTN, prostate CA (on chemo, s/p radiation), b/l nephrostomy tubes, presented  to [facility] ED on 6/14/21 with weakness and confusion. In ED foulurine in b/l nephrostomy bags noted, tachycardic, and hypotensive. s/p IVF and levo drip for septic shock, developed fluid overload with pitting edema and decreased  EF. Imaging of the spine concerning for discitis and osteomyelitis c-spine with T1 inflammation/signal changes.   Hospital coursed remarkable for BC + Klebsiella and strep anguinous. Started on vanco/zosyn 6/14. As per ID switched to ceftriaxone 2 g on 6/17. Hospitalization c/b ASHLEY on CKD with right hydronephrosis. IR consulted- no intervention, both nephrostomy tubes flushing adequately. Nephrology  consulted, started on stress dose steroids/hydrocortisone 50 mg q6 and Midodrine. CTAP showed Sludge in gallbladder.  Patient  underwent C6-7 corpectomy and posterior fusion C4-T2 on 6/29/21. Hospital course c/b tachycardia, fever with low O2 sats. Chest CT negative for  PE, lower extremity Dopplers negative. s/p PICC, discharged to inpatient rehab on 7/12/21 and then was discharged home after completion of rehab.   Last Eligard was August with Dr Angulo, 30 mg dose.   11/30/21...on tawanda/pred since June 2020. Feels  "all right". Has hot flushes and fatigue. Does not prevent activities. He falls asleep easily if he sits in a chair. Independent in ADLs, cares for his mother. No infections recently, No F/C. No recent adventures with with his PCNs. No blood in urine, occ urine via penis. Appetite is good, gained 2.2 kilos. Edema decreased. No chest pain/pressure. No cough/ CHEN. No headaches, no dizziness. No N/V/D/C.   1/11/22....telehealth today. Feels  "all right". No pains of note. t flushes daily. Has some fatigue as well. No edema noted. NO cough/CHEN No chest pain/pressure. No F/C. Appetite is good, no weight loss. Weighs 176. Checks BP at home, 142/98 RP 72 today, L arm, 142/91. Takes terazosin at night. Sees PCP in March. Needs to call PCP for tighter BP control. NO headaches, no dizziness. No F/C. No hematuria, has bilateral PCNS, has some small amount of urine from the penis. Had change of PCNs last week. Independent in ADLs.   2/8/22 - telehealth visit today. Continues on abiraterone 500mg daily + prednisone. Reports his BP today was 143/94 with HR of 74. Overall feeling "alright". Moderate fatigue, takes naps during the day. Occasional hot flashes. Appetite is good, weight today is 180lbs, he attributes recent weight gain to eating better after being discharged from the hospital. Mild ptosis of left eyelid comes and goes and has reportedly been stable for his "whole life". Bilat percutaneous nephrostomy tubes in place, no hematuria. Ongoing mild BLE edema is reportedly stable. Ongoing intermittent arthritis pains of left knee and right shoulder are stable. Denies fever, chills, headache, dizziness, balance issues, eye pain/problems, mucositis/odynophagia, chest pain, palpitations, SOB, cough, nausea/vomiting, diarrhea/constipation, abdominal pain, hematuria, rash/pruritus, neuropathy, bleeding, weakness, anxiety/depression.  3/17/22...tawanda/pred started mid June 2020. PSA was 11.6 at start. Feels "all right". No pains. No major issues with the PCNs, having a change done next week. Appetite is good, no weight loss. No cough, No CHEN. Some leg edema. No chest pain/pressure. Urine from penis is relatively little. No dysuria, no blood. has calcium oxalate excretion, which causes his tubes to be clogged. Notes some bruising. NO headaches, no dizziness, no balance issues. No F/C. No N/V/C, some minor diarrheal stools at times. Takes Imodium as as needed. Fatigue is present at times, has hot flushes occur multiple times a day.   4/20/22 - continues on abiraterone (500mg daily) + prednisone since June 2020 for mCRPC. Overall feeling "alright", ongoing mild fatigue is stable. Intermittent hot flashes are tolerable. Mild night sweats during the summer when it gets warmer. Ongoing mildly productive cough which he's had for "a long time". Occasional diarrhea that doesn't last more than a day. Bilateral percutaneous nephrostomy tubes in place, passing minimal urine from his penis. Occasional hematuria especially from right nephrostomy bag, clear today. Ongoing mild BLE edema. Ongoing chronic left knee swelling and pain 2/2 arthritis. Ongoing right shoulder pain 2/2 arthritis as well. Denies fever, chills, headache, dizziness, balance issues, eye pain/problems, mucositis/odynophagia, chest pain, palpitations, SOB, nausea/vomiting, constipation, abdominal pain, rash/pruritus, bleeding, muscle pain/weakness  5/24/22 - continues on abiraterone (500mg daily) + prednisone since June 2020 for mCRPC. BP today is 164/95, asymptomatic. BP at home has been 150s/80-90s. On occasion the DBP has been >100. Overall feeling "alright", ongoing fatigue is stable. Ongoing intermittent hot flashes. Appetite is good. Vision is changing in his left eye, he has an appt with [employer] this Thurs. Bilateral nephrostomy tubes in place, occasional hematuria at times resolves after 2-3 days. Ongoing trace edema of BLEs. Intermittent pruritus around nephrostomy tube dressings. Denies fever, chills, night sweats, headache, dizziness, balance issues, mucositis/odynophagia, chest pain, palpitations, SOB, cough, nausea/vomiting, diarrhea/constipation, abdominal pain, dysuria, incontinence,  rash, neuropathy, bleeding, muscle or joint pain/weakness.   6/21/22 - continues on abiraterone, half dose + prednisone since June 2020 for mCRPC. PSA 11.6 at start of treatment in June 2020, manjinder PSA at o.o5 August 2021, slow rise, recent PSA in May 2022 was 0.17.  feels  "all right". No pains noted. Has bilateral PCNs, changed last week. No infections, no F/C. Some fatigue, always, no worse. Appetite is good, no weight loss. NO headaches, no dizziness, no balance issues. Some edema noted, as before. No chest pain/pressure. NO bone pains. No N/V/D/C. Small amounts of urine via the penis. Hot flushes daily, multiple times. No cough, no CHEN.   7/28/22 - continues on abiraterone 500mg + prednisone since June 2020 for mCRPC. Overall feeling "alright", ongoing mild fatigue for which he occasionally takes a nap once a day. Ongoing hot flashes are tolerable. Appetite is good. Occasional cough. Bilateral percutaneous nephrostomy tubes in place passing most of the urine through the bags but still passing minimal urine via penis, no nocturia. Trace edema of BLEs. Notes intermittent cramping of hands which may be arthritis as it improves with movement. Has ongoing intermittent right buttock pain radiating down to the knee, present for the past year, pain is worse with sitting for a long time, improves with changing positions, max pain is 3/10 not requiring medication. Denies fever, chills, night sweats, headache, dizziness, balance issues, eye pain/problems, mucositis/odynophagia, chest pain, palpitations, SOB, nausea/vomiting, diarrhea/constipation, abdominal pain, dysuria, hematuria, incontinence, rash/pruritus, bleeding, weakness.   10/11/22 - continues on abiraterone 500mg daily + prednisone since June 2020 for mCRPC. Neurosurgical notes reviewed...."S/P cervical spinal fusion, 70 year old male 15 months pos op C6-7 corpectomy and posterior fusion C4-T2 on 6/29/21 with Dr. Tiffanie Martinez". Now seeing Dr Resendiz. Overall, feels "all right". No pains noted. urine flow is "fine", no incontinence, most urine still out PCNs, some from the penis. No blood, no dysuria. Hot flushes daily.  Some fatigue, RTS. Appetite is good, no weight loss. No headaches, dizziness, no balance issues, No paresthesias. Occ cough, sputum is white. No CHEN. Tubes to be changed next week. No N/V/D/C.  Some edema , improved. No chest pain/pressure  11/9/22 - continues on abiraterone 500mg daily + prednisone since June 2020 for mCRPC which he is tolerating well. Pt reports feeling generally well. No new complaints. Bilateral nephrostomy tubes in place, denies hematuria . Ongoing trace edema of BLEs. Intermittent pruritus around nephrostomy tube dressings. Denies fever, chills, night sweats, headache, dizziness, balance issues, mucositis/odynophagia, chest pain, palpitations, SOB, cough, nausea/vomiting, diarrhea/constipation, abdominal pain, dysuria, incontinence, rash, neuropathy, bleeding, muscle or joint pain/weakness. Appetite reported as good. No changes in medications  12/7/22 - continues on abiraterone 500mg daily + prednisone since June 2020 for mCRPC.  gets his Eligard 45 tomorrow form Dr Angulo. feels "all right". No pains noted except for arthrtic complaints. Appetite is okay< dropped a few pounds. usual edema, no change. No headaches, NO dizziness. Most of the urine comes from PCNs, changed every 6 weeks due to prior infections. NO fevers, no chills. Hot flushes occur, 1-2 times a day. Notes a bit more urine thru penis lately. Occ cough, no CHEN. No chest pain/pressure/palpitations. NO N/V/D/C. Mild fatigue. Independent in ADLs.   1/4/23 - continues on abiraterone 500mg daily + prednisone since June 2020 for mCRPC. Overall feeling "alright", ongoing fatigue is stable. Ongoing hot flashes are tolerable. Appetite is "ok". Ongoing occasional productive cough is stable. Bilateral perc nephrostomy tubes in place, continues to pass some urine through the penis, no nocturia. Denies fever, chills, night sweats,headache, dizziness, balance issues, eye pain/problems, mucositis/odynophagia, chest pain, palpitations, SOB, nausea/vomiting, diarrhea/constipation, abdominal pain, dysuria, hematuria, incontinence, LE edema, rash/pruritus, bleeding, muscle or joint pain/weakness.   2/13/23..... continues on abiraterone 500mg daily + prednisone since June 2020 for mCRPC. treated with cephalexin and developed rash, red skin, pruritus. Chart labeled as allergy. Hospitalization was brief. sudden onset of symptoms, Hd diarrheal stools as well. Feels  "pretty good", except for residual pruritus. No pains noted.  Has tube change set for next week. Appetite has retuned. No N/V. Diarrhea resolved, NO taste alteration. No headaches, no dizziness. had some edema. resolved. No chest pain/pressure/palpations.  has his usual fatigue and hot flushes. Cough, chronic, with clear sputum. No CHEN/SOB. No fevers of chills since discharge  Hospital Course:  Discharge Date	01-Feb-2023  Admission Date	29-Jan-2023 17:52  Reason for Admission	Syncope  Hospital Course	  71 yo M w/ HTN, gout, prostate cancer s/p b/l nephrostomy tubes, on abiraterone, admitted w/ sepsis 2/2 UTI, improved on antibiotics.  Sepsis secondary to UTI.  was febrile, tachycardic in the ED, met criteria for sepsis on admission  - U/A w/ +nitrite, large LE, prior cultures with klebsiella and E. coli  - current Ucx >3 organisms suggestive of collection contamination  - received IV zosyn (1/29/23-2/1/23),  Bcx negative, discharge on oral cephalexin to complete 10 days for complicated UTI (end date 2/7/23)  - sepsis resolved (currently afebrile, without leukocytosis, tachycardia resolved)  - IR consulted nephrostomy tubes in position and draining well, no indication for exchange at this time.   Gastroenteritis.   had reported nausea/vomiting/diarrhea after eating fried rice w/ beef  - s/p IV fluids, now resolved.   Syncope.   reports syncopal episode likely 2/2 dehydration from gastroenteritis  - s/p IV fluids, EKG sinus rhythm, troponin indeterminate but no increased  - reports symptoms resolved, ambulating around unit independently (advised call don't fall).   3/21/23..... continues on abiraterone 500mg daily + prednisone since June 2020 for mCRPC. treated with cephalexin and developed rash, red skin, pruritus. Chart labeled as allergy. Overall, he feels "all right". No pains of note. Appetite is good, no weight loss. No edema. No chest pain/pressure/palpitations. some cough, no CHEN. Hot flushes daily. Fatigue is present.  Does yoga, runs in place. No HA, no dizziness, no falls. Independent in ADLs. no fevers, no chills. Minimal urine from penis, no dysuria. PCNs are changed every 6 weeks, no blood in tubes. No N/V/D/C.   4/17/23.....  on abiraterone 500mg and prednisone since June 2020 for mCRPC. Prior transaminitis led to decrease in dose. "doing okay". Minor discomfort on left side near the PCN. Has change every 6 weeks at this time. Appetite is good, no weight loss. No N/V/D/C. Has some urine form the penis, noted if PCNs pinch. No blood in the urine, no hematuria. Hot flushes as before, about 3 times a day.  fatigue RTS. No cough, no CHEN, No fevers, no chills. No edema, no chest pains,pressure/palpitations  Exercises every AM.   5/16/23....continues on abiraterone 500mg and prednisone since June 2020 for mCRPC. Prior transaminitis led to decrease in dose. Eligard next month with Dr Angulo. Overall, feels "all right". No pains noted. Appetite is good, no weight loss. No N/V/D/C. Urine from penis on occasion, empties bladder before bed. No hematuria, no dysuria. No fevers, no chills. PCNs were changed last week. No headaches, no dizziness, no balance issues.  Exercises daily, walking and running. Occ cough, no CHEN. No chest pain/pressure/palpitations. Hot flushes remains. No fevers, no chills  6/12/23.... on abiraterone 500mg and prednisone since June 2020 for mCRPC. Prior transaminitis led to decrease in dose. Mara with Dr Angulo. Feels "well". NO pains, except for radicular pain in right thigh, present for about 6 weeks, constant. uses a topical, icy-hot. Does not awaken him, better when he is lying down, aggravated by ambulation. Appetite is normal. No weight loss. NO fevers, no chills. NO fatigue. No headaches, no dizziness, no paresthesias. No cane. No falls. No cough, no CHEN. NO N/V/D/C. Urine from PCNs. Some from the penis, no hematuria, no dysuria. No nocturia. Hot flushes daily. No edema. NO chest pain/pressure/palpitations. No back pains. Independent in ADLs  7/18/23 - continues on abiraterone + prednisone (500mg daily d/t transaminitis) for mCRPC since June 2020. Ongoing fatigue after periods activity, naps once a day. Ongoing hot flashes are tolerable. Appetite is good. Occasional cough. Bilateral perc nephrostomy tubes in place, scant blood if the tubing moves around but not persistent. Ongoing pain in right buttock radiating down posterior thigh/leg, no pain with lying down, pain is worse with sitting for a prolonged time and improves with walking, max pain is 8/10. Not taking any pain medication as the pain eases up when he gets up and walks around.   8/16/23 - continues on abiraterone + prednisone (500mg daily d/t transaminitis) for mCRPC since June 2020. Overall feeling "alright", ongoing fatigue is stable. Ongoing frequent hot flashes, tolerable. Appetite is "alright". Ongoing occasional cough. Perc nephrostomy tubes exchanged 2wks ago, occasional transient hematuria which he attributes to accidentally pulling on the tubes. Ongoing stable intermittent right buttock pain radiating down posterior thigh down to the calf, improves with movement and worse with sitting for a long time, max pain is 5/10. Using a topical cream called "[place] Dream" which has been helpful.   9/20/23 - continues on abiraterone + prednisone (500mg daily d/t transaminitis) for mCRPC since June 2020. last PCN change was last week. On a 6 week change protocol. Feels "not bad", chronic leg pains, form right buttock, down the leg. Passing urine via penis, very small amounts. No blood, no dysuria. No nocturia. No incontinence, voids about 2 times a day. Hot flushes, every day, no sweats. Fatigue is present, naps, feels refreshed afterwards. Does yoga, stretching, walking, lifts some weights, daily basis. Appetite is good, no weight Kenji Occ cough, no CHEN. No edema, no chest pain/pressure.   10/31/23 - continues on abiraterone + prednisone (500mg daily d/t transaminitis) for mCRPC since June 2020. Overall feeling "alright", ongoing fatigue is stable. Occasional hot flashes. Appetite is "alright". Perc nephrostomy tubes in place, passing minimal urine through his penis. Ongoing pain in right buttock that radiates down the posterior leg, worse in the morning when first waking up and worse with sitting for a prolonged period, improves/resolves with activity. Denies fever, chills, night sweats, headache, dizziness, balance issues, chest pain, palpitations, SOB, cough, nausea/vomiting, diarrhea/constipation, abdominal pain, dysuria, hematuria, incontinence, LE edema, bleeding.   12/5/23 - continues on abiraterone + prednisone (500mg daily d/t transaminitis) for mCRPC since June 2020. Overall feeling well, some fatigue but remains active and exercising regularly. Ongoing hot flashes are tolerable. Appetite is good. Bilateral percutaneous nephrostomy tubes in place, no hematuria. Ongoing right buttock pain that radiates down the posterior leg, worse in the morning and improves with activity, max pain is 4-5/10, declines referral to PM&R. Denies fever, chills, night sweats, headache, dizziness, balance issues, chest pain, palpitations, SOB, cough, nausea/vomiting, diarrhea/constipation, abdominal pain, LE edema, bleeding.  1/2/24 - on abiraterone + prednisone since June 2020, discontinued 12/26/23 for POD seen on 12/21/23 bone scan. Feeling well, no significant fatigue. Remains active and doing exercises daily. Occasional hot flashes are tolerable. Bilateral percutaneous nephrostomy tubes in place, no hematuria. Ongoing right buttock pain that radiates down the posterior leg, worse in the morning and improves with activity, max pain is 7-8/10, pain is stable. Denies fever, chills, night sweats, headache, dizziness, balance issues, chest pain, palpitations, SOB, cough, nausea/vomiting, diarrhea/constipation, abdominal pain, LE edema, bleeding.   2/14/24 - Xtandi started early Jan 2024 for mCRPC. Over the past week he's been very fatigued. Ongoing hot flashes. Appetite is decreased and not eating as much as he used to, weight is down 11lbs over the past 6wks. Stable cough since before start of Xtandi. Percutaneous nephrostomy tubes in place, no hematuria. Notes some pains in the hands, knees, and ankles which he attributes to arthritis. Ongoing pain in right buttock radiating down posterior leg, pain is worse in the AM when first waking up and improves with activity/walking, pain waxes and wanes, max pain is 7/10 but not taking any medication for this. Denies fever, chills, night sweats, headache, dizziness, balance issues, chest pain, palpitations, SOB, nausea/vomiting, diarrhea/constipation, abdominal pain, LE edema, bleeding.   3/14/24 - continues on Xtandi since early Jan 2024 for mCRPC. Overall feeling ok, fatigue is a little improved which he believes is because his BP hasn't been as low as it was in the past. BP at home has been 120's/70-80's in the mornings. Ongoing hot flashes are not as intense. Appetite is decreased, weight is down 7lbs over the past month, but overall down 18lbs over 2 months. Urine from bilateral percutaneous nephrostomy tubes is "good", no hematuria. Ongoing pain in right buttock down the posterior leg, pain is worse with sitting for a prolonged period, improves with getting up and moving around, not interfering with his sleep, not needing any medication for pain. Denies fever, chills, night sweats, headache, dizziness, balance issues, chest pain, palpitations, SOB, cough, nausea/vomiting, diarrhea/constipation, abdominal pain, LE edema, bleeding.  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 NO AAW benefit. Started tawanda/pred mid June 2020, discontinued 12/26/23 for disease progression. Xtandi started early Jan 2024, decreased to 80mg daily in May 2024 d/t weight loss. Switched to docetazel in 12/2024 due to PSA progression. [de-identified] : 4/16/24 - continues on Xtandi since early Jan 2024 for mCRPC. Excess lambda light chains on last blood work, no M-spike. Feels well. no Pains. Has to some fatigue, naps during the day. Appetite is "good", No N/V/D/C. No cough, no CHEN. NO chest pain/pressure. No headaches, no dizziness. No balance issues, minimal, walks with a cane. No falls. Urine  flos via tubes, no recent infections. NO blood in urine. Some minor urine amounts from penis. No nocturia, no dysuria. Hot flushes occur, less than before, 1-2 times a day. No edema noted. Independent in ADLs.  CDX done, no targets  5/15/24 - continues on Xtandi since early Jan 2024 for mCRPC. Feeling "alright". Ongoing fatigue, naps 1-2x/day. Occasional hot flashes. Decreased appetite, not eating as much as before, drinking 1-2 Ensure per day, weight is down 5lbs over the past month. Bilateral percutaneous nephrostomy tubes in place, no hematuria. Ongoing arthritic pains of the shoulders and hands at times. Denies fever, chills, night sweats, headache, dizziness, balance issues, chest pain, palpitations, SOB, cough, nausea/vomiting, diarrhea/constipation, abdominal pain, LE edema, bleeding.   6/12/24 - continues on Xtandi since early Jan 2024 for mCRPC, decreased to 80mg daily as of May 2024 for weight loss. Ongoing fatigue is stable. Ongoing hot flashes. Appetite is "a little better". Urine from nephrostomy tubes is clear yellow, no hematuria. Denies fever, chills, night sweats, headache, dizziness, balance issues, chest pain, palpitations, SOB, cough, nausea/vomiting, diarrhea/constipation, abdominal pain, LE edema, bleeding, muscle or joint pain/weakness.  7/11/24 - on Xtandi since early Jan 2024 for mCRPC, decreased to 80mg daily as of May 2024 for weight loss. Patient accompanied by brother, reports to be feeling well, appetite slowly improving, continues to have nephrostomy tubes in places, last changed 23 weeks ago draining clear yellow urine. Denies fever, chills, headaches, chest pain, sob, abdominal pain/back pain.   8/14/24 - on Xtandi since early Jan 2024 for mCRPC, decreased to 80mg daily as of May 2024 for weight loss. Overall feeling well, ongoing fatigue is stable. Ongoing hot flashes.  Appetite is stable, weight is up a couple lbs since last visit. Diarrhea yesterday "all day" with >6-7 episodes of soft/loose stool, he took PRN Imodium yesterday, no more stool today, unsure if it was r/t something he ate. Urine from nephrostomy tubes is "good", no hematuria. Denies fever, chills, night sweats, headache, dizziness, chest pain, palpitations, SOB, cough, nausea/vomiting, constipation, abdominal pain, LE edema, bleeding, muscle or joint pain/weakness.  9/17/24 - on Xtandi since early Jan 2024 for mCRPC, decreased to 80mg daily as of May 2024 for weight loss. Feeling "alright", stable fatigue. Ongoing hot flashes are tolerable. Appetite is stable. Having intermittent diarrhea approx every other week with approx 3 episodes when it occurs but resolves with PRN Imodium 1-2 tabs, having normal BMs in between, unsure if diarrhea is r/t eating spinach. Bilateral nephrostomy tubes in place. Denies fever, chills, night sweats, headache, dizziness, chest pain, palpitations, SOB, cough, nausea/vomiting, constipation, abdominal pain, hematuria, LE edema, bleeding, muscle or joint pain/weakness.  10/16/24 - on Xtandi since early Jan 2024 for mCRPC, decreased to 80mg daily as of May 2024 for weight loss. Ongoing fatigue is stable. Ongoing hot flashes are tolerable. Appetite is "alright". Occasional diarrhea is improved, only 2 episodes of diarrhea over the past month. Bilateral percutaneous nephrostomy tubes in place, still passes some urine through his penis. Ongoing intermittent arthritic pains of the shoulders and knees are stable since before cancer diagnosis. Denies fever, chills, night sweats, headache, dizziness, chest pain, palpitations, SOB, cough, nausea/vomiting, constipation, abdominal pain, dysuria, hematuria, incontinence, LE edema, bleeding.   11/14/24: presents for follow up and management of metastatic CRPC on ADT + reduced dose enzalutamide in setting of weight loss and fatigue. His PSA has been noted to be rising over the last several visits from a manjinder of 8.13 to 15.3 at last visit on 10/16/24.   12/2/24: He presents for follow up and cycle 1 of docetaxel 75mg/m2 after his PSA was noted to rise further from 15.3 to 21.4. He has stopped enzalutamide.  He denies any new significant complaints since last visit.   12/23/24: He presents for follow up and cycle 2 of docetaxel 75mg/m2. He notes transient diarrhea and increased fatigue after cycle 1. Also notes alopecia and increased blood pressure when he takes steroid premedication. Denies fever, chills, night sweats, bony pain.

## 2024-12-25 NOTE — HISTORY OF PRESENT ILLNESS
[de-identified] : This 66-year-old male was first seen in consultation on July 20, 2016. In August of 2013 his PSA was 4.3. In September 30, 2013 it was 4.9. He underwent a biopsy in March 5, 2014 revealing Ade 7 (3+4) disease. He underwent external beam radiation therapy for a total dose of 8100 cGy in 45 fractions from May 25 and total July 31 of 2014. He subsequently experienced PSA failure and brachytherapy was considered. A CT scan was performed as part of that evaluation revealing a filling defect in the left ureter. In March 2016, he presented to the emergency room with a creatinine of 13.7 and potassium of 8.9. Bilateral nephrostomy tubes were placed. He was found to have GI bleeding and a colonoscopy revealed evidence of radiation proctitis. He has been on Lupron. 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 His initial staging was stage II, [id number].    The original pathology was reviewed prior to radiation. The left base revealed adenocarcinoma the prostate, Ade score 7 (3+4) involving 2 out of 2 cores, 40% of each core, with perineural invasion. The left mid zone revealed adenocarcinoma the prostate, Ade score 7 and (4+3) involving 50% of each of 2 cores, with perineural invasion noted. The left apex had 3 of 3 cores involved. One core was Ade 7 (3+4) involving 100% of the core. The other 2 cores had a Ade 6 (3+3) in less than 5% of 2 cores. Perineural invasion was identified as well.  Posttreatment biopsy revealed the presence of adenocarcinoma within the prostate gland. The PSA was 11 at the time of the biopsy.  Feels well at initial consultation. Started Lupron in January 2016 before the renal failure/obstruction. No pains. Fatigue at times. Appetite good, stable weight. Lost weight with illness in March, typical weight prior was 210, left hospital at 170. Weight stable. He was transfused multiple times. Some urine through penis recently. Has bilateral percutaneous nephrostomies. No blood, dysuria, no incontinence. Nocturia x 2-3 at this time. Right sided nephrostomy still has output, small amount in left bag. Due for change of tubes in September. Had radiation proctitis and has laser treatment. Sees GI in follow-up next month.   10/18/16...Had a colonoscopy, showed radiation colitis. No further rectal blood noted. Urine flow mostly from PCNs, some from penis. No dysuria, some  hematuria noted at times. Appetite is normal. No weight loss. No fatigue. Hot flushes occur daily. .  4/18/17...last scans 9/26.  Saw PCP and had PSA done, shows slight increase over manjinder. he was given ferrous sulfate to take for anemia. No pains. Urine flow is mostly thru PCNs. Still has occasional blood in the urine, noted in the bag. Has less volume form the left side.  Hot flushes occur a few a day, 15-20 minutes, occ sweats. Appetite is good, no weight loss. Some fatigue noted. No edema. Occ blood with stools, saw Dr Grossman in September, due for follow up. has not had major bleeding recently.   8/1/17...Surya states that he is feeling good, his urine flow is strong, slight increase, no pains anywhere, he gets hot flashes a "few  day, they are coming." He has some minor fatigue, He has percutaneous nephrostomies, He follows with Dr. Dick, no obvious hematuria, no breast tenderness.  11/14/17...Received Lupron from Dr Dick a few weeks ago. PSA was 0.02 on 8/1/17. He gets hot flashes a couple times a day, He has b/l nephrostomies, is able to pass urine through his penis. He denies hematuria, dysuria He states appetite is good. He gets some fatigue, He denies any pains.  4/17/18...Received Lupron from Dr Dick Jan 23, 2018. Not seen since November 17, due to illness. 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 Feels well. No pains noted. Some fatigue. Appetite is good. Reports that he has diarrheal for 2-3 months, not daily. He had loose stools this AM, took Imodium. Diarrhea occurs 1-2 days a week, the n stops. Some flatus, no cramps. No blood in the stool. Urine flow is via nephrostomy, minor amount thru penis, no dysuria, no incontinence. No blood in urine. Hot flushes most days.    Minor fatigue. Appetite normal.   7/17/18...On CAB. Feels "okay". No pains. Has bilateral nephrostomy tubes, due for change in August. No recent infections, occasionally passes urine via penis, no blood, no dysuria. Still has hot flushes, daily, more in the winter. Some fatigue, no physical impairment. Appetite is good. Weight stable. Diarrhea persists, on and off, not daily. Watery at times. takes Imodium on occasion. Occ small amount of blood with hard stools, secondary to radiation proctitis.   10/23/18...Feels "okay". No pains. Urine flow is minimal, most comes out of the nephrostomy tubes, nocturia -none. No blood, no dysuria. No incontinence. Hot flushes occur, day and night. Appetite is good, no weight. No dyspepsia, no nausea, no vomiting. Diarrheal stools occur episodically, every 2-3 weeks. No blood in the stool. Usually 1-2 BMs per day. Occasionally notes blood on toiler paper with hard stool. has RT proctitis.   2/5/19...Feels well, no pains. has hot flushes frequently, daily. Has some sweats with them at night. Appetite is good, no weight loss. Urine flow is minimal thru penis, nocturia does not occur. Most urine form PCNs, last catheter change January 2019, by IR at [facility]. Occ minor blood in the urine, when the catheter is loose. No back pains, no bone pains. Occ fatigue.   5/7/19...Had a calcium of 11.1 last visit. Called him and told to stop calcium and vit D. Repeat 3 weeks later was normal. Continues on Lupron and Casodex. Feels well. Hot flushes every day. He has some fatigue, unchanged. Appetite is good, no weight loss. Urine is minimal from penis, has bilateral PCNs. No bone pains. No edema.   8/8/19...Feels "all right", as a URTI. No pains noted. Hot flushes multiple time a day. Fatigue is present, mild. Urine flow is 'good', PCN bilateral, most flow form the right. Some flow thru penis, voids 1-2 times a day. NO blood, no dysuria. Due for PCN change next week. Appetite is good, no weight change.  10/29/19...Local failure prostate cancer, on CAB. Feels well. No pains., Urine flow is good. Has bilateral PCNs. Most of urine goes to PCNs. Occ small amount of blood in the bag. Voids 2 times a day. Hot flushes, daily, with sweats on occasion. has fatigue at times. Appetite is good, no weight loss. NO leg edema. No bleeding from the RT proctitis recently  1/28/20...Stopped bicalutamide after last visit. Last seen 3 months ago. PSA on 1/7 was 0.27, thus continued to rise somewhat. NO bone pains. Hot flushes daily, occ sweats. Appetite is good, no weight loss. No fatigue of note. Urine flow is good, but most flow is into the PCNs. Last change of PCNs was December after he had some leaking on one side. No hematuria, no dysuria. No incontinence.   5/6/20...Verbal permission granted for telephone services by patient, Surya Cervantes, on 5/6/20 at 1:35 PM.   Fells well. No pains noted. Nephrostomy tubes in place, replaced last week. Appetite is good, no weight loss. Hot flushes daily. He has chronic fatigue complaints. No leg edema noted. Urine through penis is minimal, only when tubes become clogged. No blood in the urine. No nausea, no vomiting. Has diarrhea at times, once every 2-3 weeks. No back pains noted. No fevers, no chills., No cough, no sputum.   6/17/20...Verbal permission for telephone contact was granted by the patient, Surya Cervantes, on June 17, at 4 PM. Feels "all right". NO pains. Hot flushes, daily, occ minor sweats. Fatigue is present, rated mild, occasional naps. Appetite is good, no weight loss. No edema. Urine flow is minimal from penis, has bilateral PCNs. No blood. No incontinence. No recent infections. No cough NO CHEN. No F/C. Helps in care of his mother, watches TV, not very active. Jeana from [employer].   7/29/20...Verbal permission for telephone contact was granted by the patient, Surya Cervantes, on July 29, 2020 at 3:10 PM.  Started tawanda/pred on June 16, 2020. Feels  "pretty good". He was having diarrhea prior and he no longer had diarrhea.   No pains at this time. No edema. No headaches. Checks BP on occasion.  BPs have been "normal", he will check often. Urine flow "about the same",. Most urine comes from PCNs. No nocturia, no  hematuria, no dysuria. Appetite is normal, thinks he may have lost a few pounds. No cough, No CHEN. NO F/C. Hot flushes occur daily. No change. Mild fatigue.  8/26/20...On abiraterone and prednisone since June, PSA dropped significantly. taking meds correctly. Feels 'all right", no pains. Fatigue is present, as before, "not terrible", takes naps. Slightly more fatigue than before. Appetite is good, but lost weight. eats with his mother, she eats less and so does he. No nausea, no vomiting. No headaches, no edema of note. Most of urine comes from PCNs, Urine from penis is minimal, does not get up at night. No blood, no dysuria. No incontinence. No blood in the PCN bags.  Has leg cramps daily, particularly in the AM. Dr Angulo administers Lupron, due for Rx on 9/1/20.   9/22/20...He is seen in the office today in follow-up. He's been on abiraterone and prednisone since June. He had elevated transaminases on September 3 and his abiraterone was subsequently held. He will have repeat blood work done today after today's visit. He states that he feels "all right." His appetite is stated to be "all right." His weight has been stable. He denies dyspepsia, nausea or vomiting. He denies constipation or diarrheal stools. There is no cough or shortness of breath. He denies skin rashes or pruritus. There are no complaints of pains at this time. Fatigue is present but it does not impair his daily activities. He takes occasional naps during the day. He denies arthralgias or myalgias. There are no headaches or dizziness. He has bilateral percutaneous nephrostomy tubes draining yellow urine. There is no blood in the percutaneous nephrostomy bags. Most of the urine comes from the percutaneous nephrostomy tubes with minimal amount of urine coming out through the penis. There is no nocturia. There is no hematuria or dysuria. There is no incontinence. There is no edema in the extremities. He has occasional hot flashes but felt it has improved these past few days. He denies fevers or chills. He infrequently checks his blood pressure at home. He would get blood pressure readings ranging from 150s-160s systolic over 80s-90s diastolic. He remains independent in his activities of daily living.   10/27/20...Verbal permission for telephone services granted by the patient,  Surya Cervantes on October 27, 2020 at 3:48 PM.  Stared abiraterone in June. Feels "pretty good". NO increased edema. No headaches. Hot flushes are less. Appetite is good, no weight loss. Has PCNs, has urine form the penis at times. NO blood in the urine. No dysuria. No nocturia. No incontinence. No bone pains noted. Fatigue  RTs, takes a nap at times. Cares for his elderly mother at home.   11/25/20...Verbal permission for telephone services granted by the patient,  Surya Cervantes on November 25, 2020 at 1:50 PM On 1/2 dose tawanda due to transaminases. No pains, feels "all right". Some fatigue. Urine flow form penis in minimal, has bilateral PCNs. Occasional small amount of blood in the right PCN tube. Appetite is good, no weight loss. No cough, no CHEN. Hot flushes daily. No headaches, no edema. BP monitored at home, 151/89, pulse 95.   12/23/20...Verbal permission for telephone services granted by the patient,  Surya Cervantes on December 23, 2020 at  1:32 PM.  Feels well. No pains noted. Urine flow RTS. Has PCNs, minimal urine via penis on occasion. No blood in urine or tubes of note. Appetite is good, no weight loss. NO edema of the legs. NO headaches, no dizziness. No cough, no CHEN. No F/C. Hot flushes occur daily, less often. Due Lupron next month from Dr Angulo. Mild fatigue, take a nap. Independent in ADLs, cares for his mother. BP at home 138/90, pulse 71. Left arm 147/92  1/28/21...Verbal permission for telephone services granted by the patient,  Surya Cervantes on January 28, 2021 at 315 PM.  Surya reports that he has no pains.  There is no significant urine within his blood.  In the interim, he had to have the left percutaneous nephrostomy tube changed as there was pus present.  They had some difficulties apparently.  The tube was clogged and he was unable to change it over a guidewire.  He was not treated with any antibiotics.  He does have some urine from the penis at times.  There is no fevers or chills.  His appetite is good and there is no weight loss.  He reports that his weight is 190 pounds today.  He says his blood pressure was 146/92 in the left arm and 145/85 on the right arm.  The pulse rate was 75.  He reports no edema or headaches.  There are some hot flushes.  2/25/21...Verbal permission for telephone services granted by the patient,  Surya Cervantes on 2/25/21 at 3:10 PM Feels "all right". NO pains. He was having issues with PCNs clogged, had to have removal and reinsertion. It was the right side this week, done on Tuesday. Told to flush it daily rather than every 3 days. Appetite is good, no weight loss, no edema. No cough, no CHEN. BP is monitored, right side 139/84 today, left 134/82, pulse 79. No headaches, no significant fatigue, does nap on occasion. NO leg weakness. No N/V/D/C. Minimal urine from penis. There was more flow when the dysfunction occurred. NO blood in the urine, no dysuria.   4/7/21...  10/26/21....Last evaluation was 4/7/21, by telephone. Completed antibiotics. Was in rehab for 6 weeks. Strength is good, no pains. Appetite is good, gained some weight. urine flow is good, no incontinence, very little urine from penis, mostly from the tubes. No headaches, no dizziness, no balance issues, No falls. Hot flushes occur. Lives with mother, helps her. No edema at this time.   From neurosurgery, this summary of events....68 yo Male w/ PMHx of HTN, prostate CA (on chemo, s/p radiation), b/l nephrostomy tubes, presented  to [facility] ED on 6/14/21 with weakness and confusion. In ED foulurine in b/l nephrostomy bags noted, tachycardic, and hypotensive. s/p IVF and levo drip for septic shock, developed fluid overload with pitting edema and decreased  EF. Imaging of the spine concerning for discitis and osteomyelitis c-spine with T1 inflammation/signal changes.   Hospital coursed remarkable for BC + Klebsiella and strep anguinous. Started on vanco/zosyn 6/14. As per ID switched to ceftriaxone 2 g on 6/17. Hospitalization c/b ASHLEY on CKD with right hydronephrosis. IR consulted- no intervention, both nephrostomy tubes flushing adequately. Nephrology  consulted, started on stress dose steroids/hydrocortisone 50 mg q6 and Midodrine. CTAP showed Sludge in gallbladder.  Patient  underwent C6-7 corpectomy and posterior fusion C4-T2 on 6/29/21. Hospital course c/b tachycardia, fever with low O2 sats. Chest CT negative for  PE, lower extremity Dopplers negative. s/p PICC, discharged to inpatient rehab on 7/12/21 and then was discharged home after completion of rehab.   Last Eligard was August with Dr Angulo, 30 mg dose.   11/30/21...on tawanda/pred since June 2020. Feels  "all right". Has hot flushes and fatigue. Does not prevent activities. He falls asleep easily if he sits in a chair. Independent in ADLs, cares for his mother. No infections recently, No F/C. No recent adventures with with his PCNs. No blood in urine, occ urine via penis. Appetite is good, gained 2.2 kilos. Edema decreased. No chest pain/pressure. No cough/ CHEN. No headaches, no dizziness. No N/V/D/C.   1/11/22....telehealth today. Feels  "all right". No pains of note. t flushes daily. Has some fatigue as well. No edema noted. NO cough/CHEN No chest pain/pressure. No F/C. Appetite is good, no weight loss. Weighs 176. Checks BP at home, 142/98 RP 72 today, L arm, 142/91. Takes terazosin at night. Sees PCP in March. Needs to call PCP for tighter BP control. NO headaches, no dizziness. No F/C. No hematuria, has bilateral PCNS, has some small amount of urine from the penis. Had change of PCNs last week. Independent in ADLs.   2/8/22 - telehealth visit today. Continues on abiraterone 500mg daily + prednisone. Reports his BP today was 143/94 with HR of 74. Overall feeling "alright". Moderate fatigue, takes naps during the day. Occasional hot flashes. Appetite is good, weight today is 180lbs, he attributes recent weight gain to eating better after being discharged from the hospital. Mild ptosis of left eyelid comes and goes and has reportedly been stable for his "whole life". Bilat percutaneous nephrostomy tubes in place, no hematuria. Ongoing mild BLE edema is reportedly stable. Ongoing intermittent arthritis pains of left knee and right shoulder are stable. Denies fever, chills, headache, dizziness, balance issues, eye pain/problems, mucositis/odynophagia, chest pain, palpitations, SOB, cough, nausea/vomiting, diarrhea/constipation, abdominal pain, hematuria, rash/pruritus, neuropathy, bleeding, weakness, anxiety/depression.  3/17/22...tawanda/pred started mid June 2020. PSA was 11.6 at start. Feels "all right". No pains. No major issues with the PCNs, having a change done next week. Appetite is good, no weight loss. No cough, No CHEN. Some leg edema. No chest pain/pressure. Urine from penis is relatively little. No dysuria, no blood. has calcium oxalate excretion, which causes his tubes to be clogged. Notes some bruising. NO headaches, no dizziness, no balance issues. No F/C. No N/V/C, some minor diarrheal stools at times. Takes Imodium as as needed. Fatigue is present at times, has hot flushes occur multiple times a day.   4/20/22 - continues on abiraterone (500mg daily) + prednisone since June 2020 for mCRPC. Overall feeling "alright", ongoing mild fatigue is stable. Intermittent hot flashes are tolerable. Mild night sweats during the summer when it gets warmer. Ongoing mildly productive cough which he's had for "a long time". Occasional diarrhea that doesn't last more than a day. Bilateral percutaneous nephrostomy tubes in place, passing minimal urine from his penis. Occasional hematuria especially from right nephrostomy bag, clear today. Ongoing mild BLE edema. Ongoing chronic left knee swelling and pain 2/2 arthritis. Ongoing right shoulder pain 2/2 arthritis as well. Denies fever, chills, headache, dizziness, balance issues, eye pain/problems, mucositis/odynophagia, chest pain, palpitations, SOB, nausea/vomiting, constipation, abdominal pain, rash/pruritus, bleeding, muscle pain/weakness  5/24/22 - continues on abiraterone (500mg daily) + prednisone since June 2020 for mCRPC. BP today is 164/95, asymptomatic. BP at home has been 150s/80-90s. On occasion the DBP has been >100. Overall feeling "alright", ongoing fatigue is stable. Ongoing intermittent hot flashes. Appetite is good. Vision is changing in his left eye, he has an appt with [facility] this Thurs. Bilateral nephrostomy tubes in place, occasional hematuria at times resolves after 2-3 days. Ongoing trace edema of BLEs. Intermittent pruritus around nephrostomy tube dressings. Denies fever, chills, night sweats, headache, dizziness, balance issues, mucositis/odynophagia, chest pain, palpitations, SOB, cough, nausea/vomiting, diarrhea/constipation, abdominal pain, dysuria, incontinence,  rash, neuropathy, bleeding, muscle or joint pain/weakness.   6/21/22 - continues on abiraterone, half dose + prednisone since June 2020 for mCRPC. PSA 11.6 at start of treatment in June 2020, manjinder PSA at o.o5 August 2021, slow rise, recent PSA in May 2022 was 0.17.  feels  "all right". No pains noted. Has bilateral PCNs, changed last week. No infections, no F/C. Some fatigue, always, no worse. Appetite is good, no weight loss. NO headaches, no dizziness, no balance issues. Some edema noted, as before. No chest pain/pressure. NO bone pains. No N/V/D/C. Small amounts of urine via the penis. Hot flushes daily, multiple times. No cough, no CHEN.   7/28/22 - continues on abiraterone 500mg + prednisone since June 2020 for mCRPC. Overall feeling "alright", ongoing mild fatigue for which he occasionally takes a nap once a day. Ongoing hot flashes are tolerable. Appetite is good. Occasional cough. Bilateral percutaneous nephrostomy tubes in place passing most of the urine through the bags but still passing minimal urine via penis, no nocturia. Trace edema of BLEs. Notes intermittent cramping of hands which may be arthritis as it improves with movement. Has ongoing intermittent right buttock pain radiating down to the knee, present for the past year, pain is worse with sitting for a long time, improves with changing positions, max pain is 3/10 not requiring medication. Denies fever, chills, night sweats, headache, dizziness, balance issues, eye pain/problems, mucositis/odynophagia, chest pain, palpitations, SOB, nausea/vomiting, diarrhea/constipation, abdominal pain, dysuria, hematuria, incontinence, rash/pruritus, bleeding, weakness.   10/11/22 - continues on abiraterone 500mg daily + prednisone since June 2020 for mCRPC. Neurosurgical notes reviewed...."S/P cervical spinal fusion, 70 year old male 15 months pos op C6-7 corpectomy and posterior fusion C4-T2 on 6/29/21 with Dr. Tiffanie Martinez". Now seeing Dr Resendiz. Overall, feels "all right". No pains noted. urine flow is "fine", no incontinence, most urine still out PCNs, some from the penis. No blood, no dysuria. Hot flushes daily.  Some fatigue, RTS. Appetite is good, no weight loss. No headaches, dizziness, no balance issues, No paresthesias. Occ cough, sputum is white. No CHEN. Tubes to be changed next week. No N/V/D/C.  Some edema , improved. No chest pain/pressure  11/9/22 - continues on abiraterone 500mg daily + prednisone since June 2020 for mCRPC which he is tolerating well. Pt reports feeling generally well. No new complaints. Bilateral nephrostomy tubes in place, denies hematuria . Ongoing trace edema of BLEs. Intermittent pruritus around nephrostomy tube dressings. Denies fever, chills, night sweats, headache, dizziness, balance issues, mucositis/odynophagia, chest pain, palpitations, SOB, cough, nausea/vomiting, diarrhea/constipation, abdominal pain, dysuria, incontinence, rash, neuropathy, bleeding, muscle or joint pain/weakness. Appetite reported as good. No changes in medications  12/7/22 - continues on abiraterone 500mg daily + prednisone since June 2020 for mCRPC.  gets his Eligard 45 tomorrow form Dr Angulo. feels "all right". No pains noted except for arthrtic complaints. Appetite is okay< dropped a few pounds. usual edema, no change. No headaches, NO dizziness. Most of the urine comes from PCNs, changed every 6 weeks due to prior infections. NO fevers, no chills. Hot flushes occur, 1-2 times a day. Notes a bit more urine thru penis lately. Occ cough, no CHEN. No chest pain/pressure/palpitations. NO N/V/D/C. Mild fatigue. Independent in ADLs.   1/4/23 - continues on abiraterone 500mg daily + prednisone since June 2020 for mCRPC. Overall feeling "alright", ongoing fatigue is stable. Ongoing hot flashes are tolerable. Appetite is "ok". Ongoing occasional productive cough is stable. Bilateral perc nephrostomy tubes in place, continues to pass some urine through the penis, no nocturia. Denies fever, chills, night sweats,headache, dizziness, balance issues, eye pain/problems, mucositis/odynophagia, chest pain, palpitations, SOB, nausea/vomiting, diarrhea/constipation, abdominal pain, dysuria, hematuria, incontinence, LE edema, rash/pruritus, bleeding, muscle or joint pain/weakness.   2/13/23..... continues on abiraterone 500mg daily + prednisone since June 2020 for mCRPC. treated with cephalexin and developed rash, red skin, pruritus. Chart labeled as allergy. Hospitalization was brief. sudden onset of symptoms, Hd diarrheal stools as well. Feels  "pretty good", except for residual pruritus. No pains noted.  Has tube change set for next week. Appetite has retuned. No N/V. Diarrhea resolved, NO taste alteration. No headaches, no dizziness. had some edema. resolved. No chest pain/pressure/palpations.  has his usual fatigue and hot flushes. Cough, chronic, with clear sputum. No CHEN/SOB. No fevers of chills since discharge  Hospital Course:  Discharge Date	01-Feb-2023  Admission Date	29-Jan-2023 17:52  Reason for Admission	Syncope  Hospital Course	  71 yo M w/ HTN, gout, prostate cancer s/p b/l nephrostomy tubes, on abiraterone, admitted w/ sepsis 2/2 UTI, improved on antibiotics.  Sepsis secondary to UTI.  was febrile, tachycardic in the ED, met criteria for sepsis on admission  - U/A w/ +nitrite, large LE, prior cultures with klebsiella and E. coli  - current Ucx >3 organisms suggestive of collection contamination  - received IV zosyn (1/29/23-2/1/23),  Bcx negative, discharge on oral cephalexin to complete 10 days for complicated UTI (end date 2/7/23)  - sepsis resolved (currently afebrile, without leukocytosis, tachycardia resolved)  - IR consulted nephrostomy tubes in position and draining well, no indication for exchange at this time.   Gastroenteritis.   had reported nausea/vomiting/diarrhea after eating fried rice w/ beef  - s/p IV fluids, now resolved.   Syncope.   reports syncopal episode likely 2/2 dehydration from gastroenteritis  - s/p IV fluids, EKG sinus rhythm, troponin indeterminate but no increased  - reports symptoms resolved, ambulating around unit independently (advised call don't fall).   3/21/23..... continues on abiraterone 500mg daily + prednisone since June 2020 for mCRPC. treated with cephalexin and developed rash, red skin, pruritus. Chart labeled as allergy. Overall, he feels "all right". No pains of note. Appetite is good, no weight loss. No edema. No chest pain/pressure/palpitations. some cough, no CHEN. Hot flushes daily. Fatigue is present.  Does yoga, runs in place. No HA, no dizziness, no falls. Independent in ADLs. no fevers, no chills. Minimal urine from penis, no dysuria. PCNs are changed every 6 weeks, no blood in tubes. No N/V/D/C.   4/17/23.....  on abiraterone 500mg and prednisone since June 2020 for mCRPC. Prior transaminitis led to decrease in dose. "doing okay". Minor discomfort on left side near the PCN. Has change every 6 weeks at this time. Appetite is good, no weight loss. No N/V/D/C. Has some urine form the penis, noted if PCNs pinch. No blood in the urine, no hematuria. Hot flushes as before, about 3 times a day.  fatigue RTS. No cough, no CHEN, No fevers, no chills. No edema, no chest pains,pressure/palpitations  Exercises every AM.   5/16/23....continues on abiraterone 500mg and prednisone since June 2020 for mCRPC. Prior transaminitis led to decrease in dose. Eligard next month with Dr Angulo. Overall, feels "all right". No pains noted. Appetite is good, no weight loss. No N/V/D/C. Urine from penis on occasion, empties bladder before bed. No hematuria, no dysuria. No fevers, no chills. PCNs were changed last week. No headaches, no dizziness, no balance issues.  Exercises daily, walking and running. Occ cough, no CHEN. No chest pain/pressure/palpitations. Hot flushes remains. No fevers, no chills  6/12/23.... on abiraterone 500mg and prednisone since June 2020 for mCRPC. Prior transaminitis led to decrease in dose. Mara with Dr Angulo. Feels "well". NO pains, except for radicular pain in right thigh, present for about 6 weeks, constant. uses a topical, icy-hot. Does not awaken him, better when he is lying down, aggravated by ambulation. Appetite is normal. No weight loss. NO fevers, no chills. NO fatigue. No headaches, no dizziness, no paresthesias. No cane. No falls. No cough, no CHEN. NO N/V/D/C. Urine from PCNs. Some from the penis, no hematuria, no dysuria. No nocturia. Hot flushes daily. No edema. NO chest pain/pressure/palpitations. No back pains. Independent in ADLs  7/18/23 - continues on abiraterone + prednisone (500mg daily d/t transaminitis) for mCRPC since June 2020. Ongoing fatigue after periods activity, naps once a day. Ongoing hot flashes are tolerable. Appetite is good. Occasional cough. Bilateral perc nephrostomy tubes in place, scant blood if the tubing moves around but not persistent. Ongoing pain in right buttock radiating down posterior thigh/leg, no pain with lying down, pain is worse with sitting for a prolonged time and improves with walking, max pain is 8/10. Not taking any pain medication as the pain eases up when he gets up and walks around.   8/16/23 - continues on abiraterone + prednisone (500mg daily d/t transaminitis) for mCRPC since June 2020. Overall feeling "alright", ongoing fatigue is stable. Ongoing frequent hot flashes, tolerable. Appetite is "alright". Ongoing occasional cough. Perc nephrostomy tubes exchanged 2wks ago, occasional transient hematuria which he attributes to accidentally pulling on the tubes. Ongoing stable intermittent right buttock pain radiating down posterior thigh down to the calf, improves with movement and worse with sitting for a long time, max pain is 5/10. Using a topical cream called "Australian Dream" which has been helpful.   9/20/23 - continues on abiraterone + prednisone (500mg daily d/t transaminitis) for mCRPC since June 2020. last PCN change was last week. On a 6 week change protocol. Feels "not bad", chronic leg pains, form right buttock, down the leg. Passing urine via penis, very small amounts. No blood, no dysuria. No nocturia. No incontinence, voids about 2 times a day. Hot flushes, every day, no sweats. Fatigue is present, naps, feels refreshed afterwards. Does yoga, stretching, walking, lifts some weights, daily basis. Appetite is good, no weight Kenji Occ cough, no CHEN. No edema, no chest pain/pressure.   10/31/23 - continues on abiraterone + prednisone (500mg daily d/t transaminitis) for mCRPC since June 2020. Overall feeling "alright", ongoing fatigue is stable. Occasional hot flashes. Appetite is "alright". Perc nephrostomy tubes in place, passing minimal urine through his penis. Ongoing pain in right buttock that radiates down the posterior leg, worse in the morning when first waking up and worse with sitting for a prolonged period, improves/resolves with activity. Denies fever, chills, night sweats, headache, dizziness, balance issues, chest pain, palpitations, SOB, cough, nausea/vomiting, diarrhea/constipation, abdominal pain, dysuria, hematuria, incontinence, LE edema, bleeding.   12/5/23 - continues on abiraterone + prednisone (500mg daily d/t transaminitis) for mCRPC since June 2020. Overall feeling well, some fatigue but remains active and exercising regularly. Ongoing hot flashes are tolerable. Appetite is good. Bilateral percutaneous nephrostomy tubes in place, no hematuria. Ongoing right buttock pain that radiates down the posterior leg, worse in the morning and improves with activity, max pain is 4-5/10, declines referral to PM&R. Denies fever, chills, night sweats, headache, dizziness, balance issues, chest pain, palpitations, SOB, cough, nausea/vomiting, diarrhea/constipation, abdominal pain, LE edema, bleeding.  1/2/24 - on abiraterone + prednisone since June 2020, discontinued 12/26/23 for POD seen on 12/21/23 bone scan. Feeling well, no significant fatigue. Remains active and doing exercises daily. Occasional hot flashes are tolerable. Bilateral percutaneous nephrostomy tubes in place, no hematuria. Ongoing right buttock pain that radiates down the posterior leg, worse in the morning and improves with activity, max pain is 7-8/10, pain is stable. Denies fever, chills, night sweats, headache, dizziness, balance issues, chest pain, palpitations, SOB, cough, nausea/vomiting, diarrhea/constipation, abdominal pain, LE edema, bleeding.   2/14/24 - Xtandi started early Jan 2024 for mCRPC. Over the past week he's been very fatigued. Ongoing hot flashes. Appetite is decreased and not eating as much as he used to, weight is down 11lbs over the past 6wks. Stable cough since before start of Xtandi. Percutaneous nephrostomy tubes in place, no hematuria. Notes some pains in the hands, knees, and ankles which he attributes to arthritis. Ongoing pain in right buttock radiating down posterior leg, pain is worse in the AM when first waking up and improves with activity/walking, pain waxes and wanes, max pain is 7/10 but not taking any medication for this. Denies fever, chills, night sweats, headache, dizziness, balance issues, chest pain, palpitations, SOB, nausea/vomiting, diarrhea/constipation, abdominal pain, LE edema, bleeding.   3/14/24 - continues on Xtandi since early Jan 2024 for mCRPC. Overall feeling ok, fatigue is a little improved which he believes is because his BP hasn't been as low as it was in the past. BP at home has been 120's/70-80's in the mornings. Ongoing hot flashes are not as intense. Appetite is decreased, weight is down 7lbs over the past month, but overall down 18lbs over 2 months. Urine from bilateral percutaneous nephrostomy tubes is "good", no hematuria. Ongoing pain in right buttock down the posterior leg, pain is worse with sitting for a prolonged period, improves with getting up and moving around, not interfering with his sleep, not needing any medication for pain. Denies fever, chills, night sweats, headache, dizziness, balance issues, chest pain, palpitations, SOB, cough, nausea/vomiting, diarrhea/constipation, abdominal pain, LE edema, bleeding.  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 NO AAW benefit. Started tawanda/pred mid June 2020, discontinued 12/26/23 for disease progression. Xtandi started early Jan 2024, decreased to 80mg daily in May 2024 d/t weight loss. Switched to docetazel in 12/2024 due to PSA progression. [de-identified] : 4/16/24 - continues on Xtandi since early Jan 2024 for mCRPC. Excess lambda light chains on last blood work, no M-spike. Feels well. no Pains. Has to some fatigue, naps during the day. Appetite is "good", No N/V/D/C. No cough, no CHEN. NO chest pain/pressure. No headaches, no dizziness. No balance issues, minimal, walks with a cane. No falls. Urine  flos via tubes, no recent infections. NO blood in urine. Some minor urine amounts from penis. No nocturia, no dysuria. Hot flushes occur, less than before, 1-2 times a day. No edema noted. Independent in ADLs.  CDX done, no targets  5/15/24 - continues on Xtandi since early Jan 2024 for mCRPC. Feeling "alright". Ongoing fatigue, naps 1-2x/day. Occasional hot flashes. Decreased appetite, not eating as much as before, drinking 1-2 Ensure per day, weight is down 5lbs over the past month. Bilateral percutaneous nephrostomy tubes in place, no hematuria. Ongoing arthritic pains of the shoulders and hands at times. Denies fever, chills, night sweats, headache, dizziness, balance issues, chest pain, palpitations, SOB, cough, nausea/vomiting, diarrhea/constipation, abdominal pain, LE edema, bleeding.   6/12/24 - continues on Xtandi since early Jan 2024 for mCRPC, decreased to 80mg daily as of May 2024 for weight loss. Ongoing fatigue is stable. Ongoing hot flashes. Appetite is "a little better". Urine from nephrostomy tubes is clear yellow, no hematuria. Denies fever, chills, night sweats, headache, dizziness, balance issues, chest pain, palpitations, SOB, cough, nausea/vomiting, diarrhea/constipation, abdominal pain, LE edema, bleeding, muscle or joint pain/weakness.  7/11/24 - on Xtandi since early Jan 2024 for mCRPC, decreased to 80mg daily as of May 2024 for weight loss. Patient accompanied by brother, reports to be feeling well, appetite slowly improving, continues to have nephrostomy tubes in places, last changed 23 weeks ago draining clear yellow urine. Denies fever, chills, headaches, chest pain, sob, abdominal pain/back pain.   8/14/24 - on Xtandi since early Jan 2024 for mCRPC, decreased to 80mg daily as of May 2024 for weight loss. Overall feeling well, ongoing fatigue is stable. Ongoing hot flashes.  Appetite is stable, weight is up a couple lbs since last visit. Diarrhea yesterday "all day" with >6-7 episodes of soft/loose stool, he took PRN Imodium yesterday, no more stool today, unsure if it was r/t something he ate. Urine from nephrostomy tubes is "good", no hematuria. Denies fever, chills, night sweats, headache, dizziness, chest pain, palpitations, SOB, cough, nausea/vomiting, constipation, abdominal pain, LE edema, bleeding, muscle or joint pain/weakness.  9/17/24 - on Xtandi since early Jan 2024 for mCRPC, decreased to 80mg daily as of May 2024 for weight loss. Feeling "alright", stable fatigue. Ongoing hot flashes are tolerable. Appetite is stable. Having intermittent diarrhea approx every other week with approx 3 episodes when it occurs but resolves with PRN Imodium 1-2 tabs, having normal BMs in between, unsure if diarrhea is r/t eating spinach. Bilateral nephrostomy tubes in place. Denies fever, chills, night sweats, headache, dizziness, chest pain, palpitations, SOB, cough, nausea/vomiting, constipation, abdominal pain, hematuria, LE edema, bleeding, muscle or joint pain/weakness.  10/16/24 - on Xtandi since early Jan 2024 for mCRPC, decreased to 80mg daily as of May 2024 for weight loss. Ongoing fatigue is stable. Ongoing hot flashes are tolerable. Appetite is "alright". Occasional diarrhea is improved, only 2 episodes of diarrhea over the past month. Bilateral percutaneous nephrostomy tubes in place, still passes some urine through his penis. Ongoing intermittent arthritic pains of the shoulders and knees are stable since before cancer diagnosis. Denies fever, chills, night sweats, headache, dizziness, chest pain, palpitations, SOB, cough, nausea/vomiting, constipation, abdominal pain, dysuria, hematuria, incontinence, LE edema, bleeding.   11/14/24: presents for follow up and management of metastatic CRPC on ADT + reduced dose enzalutamide in setting of weight loss and fatigue. His PSA has been noted to be rising over the last several visits from a manjinder of 8.13 to 15.3 at last visit on 10/16/24.   12/2/24: He presents for follow up and cycle 1 of docetaxel 75mg/m2 after his PSA was noted to rise further from 15.3 to 21.4. He has stopped enzalutamide.  He denies any new significant complaints since last visit.   12/23/24: He presents for follow up and cycle 2 of docetaxel 75mg/m2. He notes transient diarrhea and increased fatigue after cycle 1. Also notes alopecia and increased blood pressure when he takes steroid premedication. Denies fever, chills, night sweats, bony pain.

## 2025-01-12 NOTE — CONSULT LETTER
[Dear  ___] : Dear  [unfilled], [Courtesy Letter:] : I had the pleasure of seeing your patient, [unfilled], in my office today. [Consult Closing:] : Thank you very much for allowing me to participate in the care of this patient.  If you have any questions, please do not hesitate to contact me. [Please see my note below.] : Please see my note below. [Sincerely,] : Sincerely, [DrGuanakito  ___] : Dr. SCOTT

## 2025-01-14 NOTE — HISTORY OF PRESENT ILLNESS
[Disease: _____________________] : Disease: [unfilled] [T: ___] : T[unfilled] [N: ___] : N[unfilled] [M: ___] : M[unfilled] [de-identified] : This 66-year-old male was first seen in consultation on July 20, 2016. In August of 2013 his PSA was 4.3. In September 30, 2013 it was 4.9. He underwent a biopsy in March 5, 2014 revealing Ade 7 (3+4) disease. He underwent external beam radiation therapy for a total dose of 8100 cGy in 45 fractions from May 25 and total July 31 of 2014. He subsequently experienced PSA failure and brachytherapy was considered. A CT scan was performed as part of that evaluation revealing a filling defect in the left ureter. In March 2016, he presented to the emergency room with a creatinine of 13.7 and potassium of 8.9. Bilateral nephrostomy tubes were placed. He was found to have GI bleeding and a colonoscopy revealed evidence of radiation proctitis. He has been on Lupron. 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 His initial staging was stage II, [id number].    The original pathology was reviewed prior to radiation. The left base revealed adenocarcinoma the prostate, Ade score 7 (3+4) involving 2 out of 2 cores, 40% of each core, with perineural invasion. The left mid zone revealed adenocarcinoma the prostate, Ade score 7 and (4+3) involving 50% of each of 2 cores, with perineural invasion noted. The left apex had 3 of 3 cores involved. One core was Ade 7 (3+4) involving 100% of the core. The other 2 cores had a Ade 6 (3+3) in less than 5% of 2 cores. Perineural invasion was identified as well.  Posttreatment biopsy revealed the presence of adenocarcinoma within the prostate gland. The PSA was 11 at the time of the biopsy.  Feels well at initial consultation. Started Lupron in January 2016 before the renal failure/obstruction. No pains. Fatigue at times. Appetite good, stable weight. Lost weight with illness in March, typical weight prior was 210, left hospital at 170. Weight stable. He was transfused multiple times. Some urine through penis recently. Has bilateral percutaneous nephrostomies. No blood, dysuria, no incontinence. Nocturia x 2-3 at this time. Right sided nephrostomy still has output, small amount in left bag. Due for change of tubes in September. Had radiation proctitis and has laser treatment. Sees GI in follow-up next month.   10/18/16...Had a colonoscopy, showed radiation colitis. No further rectal blood noted. Urine flow mostly from PCNs, some from penis. No dysuria, some  hematuria noted at times. Appetite is normal. No weight loss. No fatigue. Hot flushes occur daily. .  4/18/17...last scans 9/26.  Saw PCP and had PSA done, shows slight increase over manjinder. he was given ferrous sulfate to take for anemia. No pains. Urine flow is mostly thru PCNs. Still has occasional blood in the urine, noted in the bag. Has less volume form the left side.  Hot flushes occur a few a day, 15-20 minutes, occ sweats. Appetite is good, no weight loss. Some fatigue noted. No edema. Occ blood with stools, saw Dr Grossman in September, due for follow up. has not had major bleeding recently.   8/1/17...Surya states that he is feeling good, his urine flow is strong, slight increase, no pains anywhere, he gets hot flashes a "few  day, they are coming." He has some minor fatigue, He has percutaneous nephrostomies, He follows with Dr. Dick, no obvious hematuria, no breast tenderness.  11/14/17...Received Lupron from Dr Dick a few weeks ago. PSA was 0.02 on 8/1/17. He gets hot flashes a couple times a day, He has b/l nephrostomies, is able to pass urine through his penis. He denies hematuria, dysuria He states appetite is good. He gets some fatigue, He denies any pains.  4/17/18...Received Lupron from Dr Dick Jan 23, 2018. Not seen since November 17, due to illness. 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 Feels well. No pains noted. Some fatigue. Appetite is good. Reports that he has diarrheal for 2-3 months, not daily. He had loose stools this AM, took Imodium. Diarrhea occurs 1-2 days a week, the n stops. Some flatus, no cramps. No blood in the stool. Urine flow is via nephrostomy, minor amount thru penis, no dysuria, no incontinence. No blood in urine. Hot flushes most days.    Minor fatigue. Appetite normal.   7/17/18...On CAB. Feels "okay". No pains. Has bilateral nephrostomy tubes, due for change in August. No recent infections, occasionally passes urine via penis, no blood, no dysuria. Still has hot flushes, daily, more in the winter. Some fatigue, no physical impairment. Appetite is good. Weight stable. Diarrhea persists, on and off, not daily. Watery at times. takes Imodium on occasion. Occ small amount of blood with hard stools, secondary to radiation proctitis.   10/23/18...Feels "okay". No pains. Urine flow is minimal, most comes out of the nephrostomy tubes, nocturia -none. No blood, no dysuria. No incontinence. Hot flushes occur, day and night. Appetite is good, no weight. No dyspepsia, no nausea, no vomiting. Diarrheal stools occur episodically, every 2-3 weeks. No blood in the stool. Usually 1-2 BMs per day. Occasionally notes blood on toiler paper with hard stool. has RT proctitis.   2/5/19...Feels well, no pains. has hot flushes frequently, daily. Has some sweats with them at night. Appetite is good, no weight loss. Urine flow is minimal thru penis, nocturia does not occur. Most urine form PCNs, last catheter change January 2019, by IR at [facility]. Occ minor blood in the urine, when the catheter is loose. No back pains, no bone pains. Occ fatigue.   5/7/19...Had a calcium of 11.1 last visit. Called him and told to stop calcium and vit D. Repeat 3 weeks later was normal. Continues on Lupron and Casodex. Feels well. Hot flushes every day. He has some fatigue, unchanged. Appetite is good, no weight loss. Urine is minimal from penis, has bilateral PCNs. No bone pains. No edema.   8/8/19...Feels "all right", as a URTI. No pains noted. Hot flushes multiple time a day. Fatigue is present, mild. Urine flow is 'good', PCN bilateral, most flow form the right. Some flow thru penis, voids 1-2 times a day. NO blood, no dysuria. Due for PCN change next week. Appetite is good, no weight change.  10/29/19...Local failure prostate cancer, on CAB. Feels well. No pains., Urine flow is good. Has bilateral PCNs. Most of urine goes to PCNs. Occ small amount of blood in the bag. Voids 2 times a day. Hot flushes, daily, with sweats on occasion. has fatigue at times. Appetite is good, no weight loss. NO leg edema. No bleeding from the RT proctitis recently  1/28/20...Stopped bicalutamide after last visit. Last seen 3 months ago. PSA on 1/7 was 0.27, thus continued to rise somewhat. NO bone pains. Hot flushes daily, occ sweats. Appetite is good, no weight loss. No fatigue of note. Urine flow is good, but most flow is into the PCNs. Last change of PCNs was December after he had some leaking on one side. No hematuria, no dysuria. No incontinence.   5/6/20...Verbal permission granted for telephone services by patient, Surya Cervantes, on 5/6/20 at 1:35 PM.   Fells well. No pains noted. Nephrostomy tubes in place, replaced last week. Appetite is good, no weight loss. Hot flushes daily. He has chronic fatigue complaints. No leg edema noted. Urine through penis is minimal, only when tubes become clogged. No blood in the urine. No nausea, no vomiting. Has diarrhea at times, once every 2-3 weeks. No back pains noted. No fevers, no chills., No cough, no sputum.   6/17/20...Verbal permission for telephone contact was granted by the patient, Surya Cervantes, on June 17, at 4 PM. Feels "all right". NO pains. Hot flushes, daily, occ minor sweats. Fatigue is present, rated mild, occasional naps. Appetite is good, no weight loss. No edema. Urine flow is minimal from penis, has bilateral PCNs. No blood. No incontinence. No recent infections. No cough NO CHEN. No F/C. Helps in care of his mother, watches TV, not very active. Jeana from [employer].   7/29/20...Verbal permission for telephone contact was granted by the patient, Surya Cervantes, on July 29, 2020 at 3:10 PM.  Started tawanda/pred on June 16, 2020. Feels  "pretty good". He was having diarrhea prior and he no longer had diarrhea.   No pains at this time. No edema. No headaches. Checks BP on occasion.  BPs have been "normal", he will check often. Urine flow "about the same",. Most urine comes from PCNs. No nocturia, no  hematuria, no dysuria. Appetite is normal, thinks he may have lost a few pounds. No cough, No CHEN. NO F/C. Hot flushes occur daily. No change. Mild fatigue.  8/26/20...On abiraterone and prednisone since June, PSA dropped significantly. taking meds correctly. Feels 'all right", no pains. Fatigue is present, as before, "not terrible", takes naps. Slightly more fatigue than before. Appetite is good, but lost weight. eats with his mother, she eats less and so does he. No nausea, no vomiting. No headaches, no edema of note. Most of urine comes from PCNs, Urine from penis is minimal, does not get up at night. No blood, no dysuria. No incontinence. No blood in the PCN bags.  Has leg cramps daily, particularly in the AM. Dr Angulo administers Lupron, due for Rx on 9/1/20.   9/22/20...He is seen in the office today in follow-up. He's been on abiraterone and prednisone since June. He had elevated transaminases on September 3 and his abiraterone was subsequently held. He will have repeat blood work done today after today's visit. He states that he feels "all right." His appetite is stated to be "all right." His weight has been stable. He denies dyspepsia, nausea or vomiting. He denies constipation or diarrheal stools. There is no cough or shortness of breath. He denies skin rashes or pruritus. There are no complaints of pains at this time. Fatigue is present but it does not impair his daily activities. He takes occasional naps during the day. He denies arthralgias or myalgias. There are no headaches or dizziness. He has bilateral percutaneous nephrostomy tubes draining yellow urine. There is no blood in the percutaneous nephrostomy bags. Most of the urine comes from the percutaneous nephrostomy tubes with minimal amount of urine coming out through the penis. There is no nocturia. There is no hematuria or dysuria. There is no incontinence. There is no edema in the extremities. He has occasional hot flashes but felt it has improved these past few days. He denies fevers or chills. He infrequently checks his blood pressure at home. He would get blood pressure readings ranging from 150s-160s systolic over 80s-90s diastolic. He remains independent in his activities of daily living.   10/27/20...Verbal permission for telephone services granted by the patient,  Surya Cervantes on October 27, 2020 at 3:48 PM.  Stared abiraterone in June. Feels "pretty good". NO increased edema. No headaches. Hot flushes are less. Appetite is good, no weight loss. Has PCNs, has urine form the penis at times. NO blood in the urine. No dysuria. No nocturia. No incontinence. No bone pains noted. Fatigue  RTs, takes a nap at times. Cares for his elderly mother at home.   11/25/20...Verbal permission for telephone services granted by the patient,  Surya Cervantes on November 25, 2020 at 1:50 PM On 1/2 dose tawanda due to transaminases. No pains, feels "all right". Some fatigue. Urine flow form penis in minimal, has bilateral PCNs. Occasional small amount of blood in the right PCN tube. Appetite is good, no weight loss. No cough, no CHEN. Hot flushes daily. No headaches, no edema. BP monitored at home, 151/89, pulse 95.   12/23/20...Verbal permission for telephone services granted by the patient,  Surya Cervantes on December 23, 2020 at  1:32 PM.  Feels well. No pains noted. Urine flow RTS. Has PCNs, minimal urine via penis on occasion. No blood in urine or tubes of note. Appetite is good, no weight loss. NO edema of the legs. NO headaches, no dizziness. No cough, no CHEN. No F/C. Hot flushes occur daily, less often. Due Lupron next month from Dr Angulo. Mild fatigue, take a nap. Independent in ADLs, cares for his mother. BP at home 138/90, pulse 71. Left arm 147/92  1/28/21...Verbal permission for telephone services granted by the patient,  Surya Cervantes on January 28, 2021 at 315 PM.  Surya reports that he has no pains.  There is no significant urine within his blood.  In the interim, he had to have the left percutaneous nephrostomy tube changed as there was pus present.  They had some difficulties apparently.  The tube was clogged and he was unable to change it over a guidewire.  He was not treated with any antibiotics.  He does have some urine from the penis at times.  There is no fevers or chills.  His appetite is good and there is no weight loss.  He reports that his weight is 190 pounds today.  He says his blood pressure was 146/92 in the left arm and 145/85 on the right arm.  The pulse rate was 75.  He reports no edema or headaches.  There are some hot flushes.  2/25/21...Verbal permission for telephone services granted by the patient,  Surya Cervantes on 2/25/21 at 3:10 PM Feels "all right". NO pains. He was having issues with PCNs clogged, had to have removal and reinsertion. It was the right side this week, done on Tuesday. Told to flush it daily rather than every 3 days. Appetite is good, no weight loss, no edema. No cough, no CHEN. BP is monitored, right side 139/84 today, left 134/82, pulse 79. No headaches, no significant fatigue, does nap on occasion. NO leg weakness. No N/V/D/C. Minimal urine from penis. There was more flow when the dysfunction occurred. NO blood in the urine, no dysuria.   4/7/21...  10/26/21....Last evaluation was 4/7/21, by telephone. Completed antibiotics. Was in rehab for 6 weeks. Strength is good, no pains. Appetite is good, gained some weight. urine flow is good, no incontinence, very little urine from penis, mostly from the tubes. No headaches, no dizziness, no balance issues, No falls. Hot flushes occur. Lives with mother, helps her. No edema at this time.   From neurosurgery, this summary of events....70 yo Male w/ PMHx of HTN, prostate CA (on chemo, s/p radiation), b/l nephrostomy tubes, presented  to [facility] ED on 6/14/21 with weakness and confusion. In ED foulurine in b/l nephrostomy bags noted, tachycardic, and hypotensive. s/p IVF and levo drip for septic shock, developed fluid overload with pitting edema and decreased  EF. Imaging of the spine concerning for discitis and osteomyelitis c-spine with T1 inflammation/signal changes.   Hospital coursed remarkable for BC + Klebsiella and strep anguinous. Started on vanco/zosyn 6/14. As per ID switched to ceftriaxone 2 g on 6/17. Hospitalization c/b ASHLEY on CKD with right hydronephrosis. IR consulted- no intervention, both nephrostomy tubes flushing adequately. Nephrology  consulted, started on stress dose steroids/hydrocortisone 50 mg q6 and Midodrine. CTAP showed Sludge in gallbladder.  Patient  underwent C6-7 corpectomy and posterior fusion C4-T2 on 6/29/21. Hospital course c/b tachycardia, fever with low O2 sats. Chest CT negative for  PE, lower extremity Dopplers negative. s/p PICC, discharged to inpatient rehab on 7/12/21 and then was discharged home after completion of rehab.   Last Eligard was August with Dr Angulo, 30 mg dose.   11/30/21...on tawanda/pred since June 2020. Feels  "all right". Has hot flushes and fatigue. Does not prevent activities. He falls asleep easily if he sits in a chair. Independent in ADLs, cares for his mother. No infections recently, No F/C. No recent adventures with with his PCNs. No blood in urine, occ urine via penis. Appetite is good, gained 2.2 kilos. Edema decreased. No chest pain/pressure. No cough/ CHEN. No headaches, no dizziness. No N/V/D/C.   1/11/22....telehealth today. Feels  "all right". No pains of note. t flushes daily. Has some fatigue as well. No edema noted. NO cough/CHEN No chest pain/pressure. No F/C. Appetite is good, no weight loss. Weighs 176. Checks BP at home, 142/98 RP 72 today, L arm, 142/91. Takes terazosin at night. Sees PCP in March. Needs to call PCP for tighter BP control. NO headaches, no dizziness. No F/C. No hematuria, has bilateral PCNS, has some small amount of urine from the penis. Had change of PCNs last week. Independent in ADLs.   2/8/22 - telehealth visit today. Continues on abiraterone 500mg daily + prednisone. Reports his BP today was 143/94 with HR of 74. Overall feeling "alright". Moderate fatigue, takes naps during the day. Occasional hot flashes. Appetite is good, weight today is 180lbs, he attributes recent weight gain to eating better after being discharged from the hospital. Mild ptosis of left eyelid comes and goes and has reportedly been stable for his "whole life". Bilat percutaneous nephrostomy tubes in place, no hematuria. Ongoing mild BLE edema is reportedly stable. Ongoing intermittent arthritis pains of left knee and right shoulder are stable. Denies fever, chills, headache, dizziness, balance issues, eye pain/problems, mucositis/odynophagia, chest pain, palpitations, SOB, cough, nausea/vomiting, diarrhea/constipation, abdominal pain, hematuria, rash/pruritus, neuropathy, bleeding, weakness, anxiety/depression.  3/17/22...tawanda/pred started mid June 2020. PSA was 11.6 at start. Feels "all right". No pains. No major issues with the PCNs, having a change done next week. Appetite is good, no weight loss. No cough, No CHEN. Some leg edema. No chest pain/pressure. Urine from penis is relatively little. No dysuria, no blood. has calcium oxalate excretion, which causes his tubes to be clogged. Notes some bruising. NO headaches, no dizziness, no balance issues. No F/C. No N/V/C, some minor diarrheal stools at times. Takes Imodium as as needed. Fatigue is present at times, has hot flushes occur multiple times a day.   4/20/22 - continues on abiraterone (500mg daily) + prednisone since June 2020 for mCRPC. Overall feeling "alright", ongoing mild fatigue is stable. Intermittent hot flashes are tolerable. Mild night sweats during the summer when it gets warmer. Ongoing mildly productive cough which he's had for "a long time". Occasional diarrhea that doesn't last more than a day. Bilateral percutaneous nephrostomy tubes in place, passing minimal urine from his penis. Occasional hematuria especially from right nephrostomy bag, clear today. Ongoing mild BLE edema. Ongoing chronic left knee swelling and pain 2/2 arthritis. Ongoing right shoulder pain 2/2 arthritis as well. Denies fever, chills, headache, dizziness, balance issues, eye pain/problems, mucositis/odynophagia, chest pain, palpitations, SOB, nausea/vomiting, constipation, abdominal pain, rash/pruritus, bleeding, muscle pain/weakness  5/24/22 - continues on abiraterone (500mg daily) + prednisone since June 2020 for mCRPC. BP today is 164/95, asymptomatic. BP at home has been 150s/80-90s. On occasion the DBP has been >100. Overall feeling "alright", ongoing fatigue is stable. Ongoing intermittent hot flashes. Appetite is good. Vision is changing in his left eye, he has an appt with [facility] this Thurs. Bilateral nephrostomy tubes in place, occasional hematuria at times resolves after 2-3 days. Ongoing trace edema of BLEs. Intermittent pruritus around nephrostomy tube dressings. Denies fever, chills, night sweats, headache, dizziness, balance issues, mucositis/odynophagia, chest pain, palpitations, SOB, cough, nausea/vomiting, diarrhea/constipation, abdominal pain, dysuria, incontinence,  rash, neuropathy, bleeding, muscle or joint pain/weakness.   6/21/22 - continues on abiraterone, half dose + prednisone since June 2020 for mCRPC. PSA 11.6 at start of treatment in June 2020, manjinder PSA at o.o5 August 2021, slow rise, recent PSA in May 2022 was 0.17.  feels  "all right". No pains noted. Has bilateral PCNs, changed last week. No infections, no F/C. Some fatigue, always, no worse. Appetite is good, no weight loss. NO headaches, no dizziness, no balance issues. Some edema noted, as before. No chest pain/pressure. NO bone pains. No N/V/D/C. Small amounts of urine via the penis. Hot flushes daily, multiple times. No cough, no CHEN.   7/28/22 - continues on abiraterone 500mg + prednisone since June 2020 for mCRPC. Overall feeling "alright", ongoing mild fatigue for which he occasionally takes a nap once a day. Ongoing hot flashes are tolerable. Appetite is good. Occasional cough. Bilateral percutaneous nephrostomy tubes in place passing most of the urine through the bags but still passing minimal urine via penis, no nocturia. Trace edema of BLEs. Notes intermittent cramping of hands which may be arthritis as it improves with movement. Has ongoing intermittent right buttock pain radiating down to the knee, present for the past year, pain is worse with sitting for a long time, improves with changing positions, max pain is 3/10 not requiring medication. Denies fever, chills, night sweats, headache, dizziness, balance issues, eye pain/problems, mucositis/odynophagia, chest pain, palpitations, SOB, nausea/vomiting, diarrhea/constipation, abdominal pain, dysuria, hematuria, incontinence, rash/pruritus, bleeding, weakness.   10/11/22 - continues on abiraterone 500mg daily + prednisone since June 2020 for mCRPC. Neurosurgical notes reviewed...."S/P cervical spinal fusion, 70 year old male 15 months pos op C6-7 corpectomy and posterior fusion C4-T2 on 6/29/21 with Dr. Tiffanie Martinez". Now seeing Dr Resendiz. Overall, feels "all right". No pains noted. urine flow is "fine", no incontinence, most urine still out PCNs, some from the penis. No blood, no dysuria. Hot flushes daily.  Some fatigue, RTS. Appetite is good, no weight loss. No headaches, dizziness, no balance issues, No paresthesias. Occ cough, sputum is white. No CHEN. Tubes to be changed next week. No N/V/D/C.  Some edema , improved. No chest pain/pressure  11/9/22 - continues on abiraterone 500mg daily + prednisone since June 2020 for mCRPC which he is tolerating well. Pt reports feeling generally well. No new complaints. Bilateral nephrostomy tubes in place, denies hematuria . Ongoing trace edema of BLEs. Intermittent pruritus around nephrostomy tube dressings. Denies fever, chills, night sweats, headache, dizziness, balance issues, mucositis/odynophagia, chest pain, palpitations, SOB, cough, nausea/vomiting, diarrhea/constipation, abdominal pain, dysuria, incontinence, rash, neuropathy, bleeding, muscle or joint pain/weakness. Appetite reported as good. No changes in medications  12/7/22 - continues on abiraterone 500mg daily + prednisone since June 2020 for mCRPC.  gets his Eligard 45 tomorrow form Dr Angulo. feels "all right". No pains noted except for arthrtic complaints. Appetite is okay< dropped a few pounds. usual edema, no change. No headaches, NO dizziness. Most of the urine comes from PCNs, changed every 6 weeks due to prior infections. NO fevers, no chills. Hot flushes occur, 1-2 times a day. Notes a bit more urine thru penis lately. Occ cough, no CHEN. No chest pain/pressure/palpitations. NO N/V/D/C. Mild fatigue. Independent in ADLs.   1/4/23 - continues on abiraterone 500mg daily + prednisone since June 2020 for mCRPC. Overall feeling "alright", ongoing fatigue is stable. Ongoing hot flashes are tolerable. Appetite is "ok". Ongoing occasional productive cough is stable. Bilateral perc nephrostomy tubes in place, continues to pass some urine through the penis, no nocturia. Denies fever, chills, night sweats,headache, dizziness, balance issues, eye pain/problems, mucositis/odynophagia, chest pain, palpitations, SOB, nausea/vomiting, diarrhea/constipation, abdominal pain, dysuria, hematuria, incontinence, LE edema, rash/pruritus, bleeding, muscle or joint pain/weakness.   2/13/23..... continues on abiraterone 500mg daily + prednisone since June 2020 for mCRPC. treated with cephalexin and developed rash, red skin, pruritus. Chart labeled as allergy. Hospitalization was brief. sudden onset of symptoms, Hd diarrheal stools as well. Feels  "pretty good", except for residual pruritus. No pains noted.  Has tube change set for next week. Appetite has retuned. No N/V. Diarrhea resolved, NO taste alteration. No headaches, no dizziness. had some edema. resolved. No chest pain/pressure/palpations.  has his usual fatigue and hot flushes. Cough, chronic, with clear sputum. No CHEN/SOB. No fevers of chills since discharge  Hospital Course:  Discharge Date	01-Feb-2023  Admission Date	29-Jan-2023 17:52  Reason for Admission	Syncope  Hospital Course	  71 yo M w/ HTN, gout, prostate cancer s/p b/l nephrostomy tubes, on abiraterone, admitted w/ sepsis 2/2 UTI, improved on antibiotics.  Sepsis secondary to UTI.  was febrile, tachycardic in the ED, met criteria for sepsis on admission  - U/A w/ +nitrite, large LE, prior cultures with klebsiella and E. coli  - current Ucx >3 organisms suggestive of collection contamination  - received IV zosyn (1/29/23-2/1/23),  Bcx negative, discharge on oral cephalexin to complete 10 days for complicated UTI (end date 2/7/23)  - sepsis resolved (currently afebrile, without leukocytosis, tachycardia resolved)  - IR consulted nephrostomy tubes in position and draining well, no indication for exchange at this time.   Gastroenteritis.   had reported nausea/vomiting/diarrhea after eating fried rice w/ beef  - s/p IV fluids, now resolved.   Syncope.   reports syncopal episode likely 2/2 dehydration from gastroenteritis  - s/p IV fluids, EKG sinus rhythm, troponin indeterminate but no increased  - reports symptoms resolved, ambulating around unit independently (advised call don't fall).   3/21/23..... continues on abiraterone 500mg daily + prednisone since June 2020 for mCRPC. treated with cephalexin and developed rash, red skin, pruritus. Chart labeled as allergy. Overall, he feels "all right". No pains of note. Appetite is good, no weight loss. No edema. No chest pain/pressure/palpitations. some cough, no CHEN. Hot flushes daily. Fatigue is present.  Does yoga, runs in place. No HA, no dizziness, no falls. Independent in ADLs. no fevers, no chills. Minimal urine from penis, no dysuria. PCNs are changed every 6 weeks, no blood in tubes. No N/V/D/C.   4/17/23.....  on abiraterone 500mg and prednisone since June 2020 for mCRPC. Prior transaminitis led to decrease in dose. "doing okay". Minor discomfort on left side near the PCN. Has change every 6 weeks at this time. Appetite is good, no weight loss. No N/V/D/C. Has some urine form the penis, noted if PCNs pinch. No blood in the urine, no hematuria. Hot flushes as before, about 3 times a day.  fatigue RTS. No cough, no CHEN, No fevers, no chills. No edema, no chest pains,pressure/palpitations  Exercises every AM.   5/16/23....continues on abiraterone 500mg and prednisone since June 2020 for mCRPC. Prior transaminitis led to decrease in dose. Eligard next month with Dr Angulo. Overall, feels "all right". No pains noted. Appetite is good, no weight loss. No N/V/D/C. Urine from penis on occasion, empties bladder before bed. No hematuria, no dysuria. No fevers, no chills. PCNs were changed last week. No headaches, no dizziness, no balance issues.  Exercises daily, walking and running. Occ cough, no CHEN. No chest pain/pressure/palpitations. Hot flushes remains. No fevers, no chills  6/12/23.... on abiraterone 500mg and prednisone since June 2020 for mCRPC. Prior transaminitis led to decrease in dose. Mara with Dr Angulo. Feels "well". NO pains, except for radicular pain in right thigh, present for about 6 weeks, constant. uses a topical, icy-hot. Does not awaken him, better when he is lying down, aggravated by ambulation. Appetite is normal. No weight loss. NO fevers, no chills. NO fatigue. No headaches, no dizziness, no paresthesias. No cane. No falls. No cough, no CHEN. NO N/V/D/C. Urine from PCNs. Some from the penis, no hematuria, no dysuria. No nocturia. Hot flushes daily. No edema. NO chest pain/pressure/palpitations. No back pains. Independent in ADLs  7/18/23 - continues on abiraterone + prednisone (500mg daily d/t transaminitis) for mCRPC since June 2020. Ongoing fatigue after periods activity, naps once a day. Ongoing hot flashes are tolerable. Appetite is good. Occasional cough. Bilateral perc nephrostomy tubes in place, scant blood if the tubing moves around but not persistent. Ongoing pain in right buttock radiating down posterior thigh/leg, no pain with lying down, pain is worse with sitting for a prolonged time and improves with walking, max pain is 8/10. Not taking any pain medication as the pain eases up when he gets up and walks around.   8/16/23 - continues on abiraterone + prednisone (500mg daily d/t transaminitis) for mCRPC since June 2020. Overall feeling "alright", ongoing fatigue is stable. Ongoing frequent hot flashes, tolerable. Appetite is "alright". Ongoing occasional cough. Perc nephrostomy tubes exchanged 2wks ago, occasional transient hematuria which he attributes to accidentally pulling on the tubes. Ongoing stable intermittent right buttock pain radiating down posterior thigh down to the calf, improves with movement and worse with sitting for a long time, max pain is 5/10. Using a topical cream called "[place] Dream" which has been helpful.   9/20/23 - continues on abiraterone + prednisone (500mg daily d/t transaminitis) for mCRPC since June 2020. last PCN change was last week. On a 6 week change protocol. Feels "not bad", chronic leg pains, form right buttock, down the leg. Passing urine via penis, very small amounts. No blood, no dysuria. No nocturia. No incontinence, voids about 2 times a day. Hot flushes, every day, no sweats. Fatigue is present, naps, feels refreshed afterwards. Does yoga, stretching, walking, lifts some weights, daily basis. Appetite is good, no weight Kenji Occ cough, no CHEN. No edema, no chest pain/pressure.   10/31/23 - continues on abiraterone + prednisone (500mg daily d/t transaminitis) for mCRPC since June 2020. Overall feeling "alright", ongoing fatigue is stable. Occasional hot flashes. Appetite is "alright". Perc nephrostomy tubes in place, passing minimal urine through his penis. Ongoing pain in right buttock that radiates down the posterior leg, worse in the morning when first waking up and worse with sitting for a prolonged period, improves/resolves with activity. Denies fever, chills, night sweats, headache, dizziness, balance issues, chest pain, palpitations, SOB, cough, nausea/vomiting, diarrhea/constipation, abdominal pain, dysuria, hematuria, incontinence, LE edema, bleeding.   12/5/23 - continues on abiraterone + prednisone (500mg daily d/t transaminitis) for mCRPC since June 2020. Overall feeling well, some fatigue but remains active and exercising regularly. Ongoing hot flashes are tolerable. Appetite is good. Bilateral percutaneous nephrostomy tubes in place, no hematuria. Ongoing right buttock pain that radiates down the posterior leg, worse in the morning and improves with activity, max pain is 4-5/10, declines referral to PM&R. Denies fever, chills, night sweats, headache, dizziness, balance issues, chest pain, palpitations, SOB, cough, nausea/vomiting, diarrhea/constipation, abdominal pain, LE edema, bleeding.  1/2/24 - on abiraterone + prednisone since June 2020, discontinued 12/26/23 for POD seen on 12/21/23 bone scan. Feeling well, no significant fatigue. Remains active and doing exercises daily. Occasional hot flashes are tolerable. Bilateral percutaneous nephrostomy tubes in place, no hematuria. Ongoing right buttock pain that radiates down the posterior leg, worse in the morning and improves with activity, max pain is 7-8/10, pain is stable. Denies fever, chills, night sweats, headache, dizziness, balance issues, chest pain, palpitations, SOB, cough, nausea/vomiting, diarrhea/constipation, abdominal pain, LE edema, bleeding.   2/14/24 - Xtandi started early Jan 2024 for mCRPC. Over the past week he's been very fatigued. Ongoing hot flashes. Appetite is decreased and not eating as much as he used to, weight is down 11lbs over the past 6wks. Stable cough since before start of Xtandi. Percutaneous nephrostomy tubes in place, no hematuria. Notes some pains in the hands, knees, and ankles which he attributes to arthritis. Ongoing pain in right buttock radiating down posterior leg, pain is worse in the AM when first waking up and improves with activity/walking, pain waxes and wanes, max pain is 7/10 but not taking any medication for this. Denies fever, chills, night sweats, headache, dizziness, balance issues, chest pain, palpitations, SOB, nausea/vomiting, diarrhea/constipation, abdominal pain, LE edema, bleeding.   3/14/24 - continues on Xtandi since early Jan 2024 for mCRPC. Overall feeling ok, fatigue is a little improved which he believes is because his BP hasn't been as low as it was in the past. BP at home has been 120's/70-80's in the mornings. Ongoing hot flashes are not as intense. Appetite is decreased, weight is down 7lbs over the past month, but overall down 18lbs over 2 months. Urine from bilateral percutaneous nephrostomy tubes is "good", no hematuria. Ongoing pain in right buttock down the posterior leg, pain is worse with sitting for a prolonged period, improves with getting up and moving around, not interfering with his sleep, not needing any medication for pain. Denies fever, chills, night sweats, headache, dizziness, balance issues, chest pain, palpitations, SOB, cough, nausea/vomiting, diarrhea/constipation, abdominal pain, LE edema, bleeding.  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 NO AAW benefit. Started tawanda/pred mid June 2020, discontinued 12/26/23 for disease progression. Xtandi started early Jan 2024, decreased to 80mg daily in May 2024 d/t weight loss. Switched to docetazel in 12/2024 due to PSA progression. [de-identified] : 4/16/24 - continues on Xtandi since early Jan 2024 for mCRPC. Excess lambda light chains on last blood work, no M-spike. Feels well. no Pains. Has to some fatigue, naps during the day. Appetite is "good", No N/V/D/C. No cough, no CHEN. NO chest pain/pressure. No headaches, no dizziness. No balance issues, minimal, walks with a cane. No falls. Urine  flos via tubes, no recent infections. NO blood in urine. Some minor urine amounts from penis. No nocturia, no dysuria. Hot flushes occur, less than before, 1-2 times a day. No edema noted. Independent in ADLs.  CDX done, no targets  5/15/24 - continues on Xtandi since early Jan 2024 for mCRPC. Feeling "alright". Ongoing fatigue, naps 1-2x/day. Occasional hot flashes. Decreased appetite, not eating as much as before, drinking 1-2 Ensure per day, weight is down 5lbs over the past month. Bilateral percutaneous nephrostomy tubes in place, no hematuria. Ongoing arthritic pains of the shoulders and hands at times. Denies fever, chills, night sweats, headache, dizziness, balance issues, chest pain, palpitations, SOB, cough, nausea/vomiting, diarrhea/constipation, abdominal pain, LE edema, bleeding.   6/12/24 - continues on Xtandi since early Jan 2024 for mCRPC, decreased to 80mg daily as of May 2024 for weight loss. Ongoing fatigue is stable. Ongoing hot flashes. Appetite is "a little better". Urine from nephrostomy tubes is clear yellow, no hematuria. Denies fever, chills, night sweats, headache, dizziness, balance issues, chest pain, palpitations, SOB, cough, nausea/vomiting, diarrhea/constipation, abdominal pain, LE edema, bleeding, muscle or joint pain/weakness.  7/11/24 - on Xtandi since early Jan 2024 for mCRPC, decreased to 80mg daily as of May 2024 for weight loss. Patient accompanied by brother, reports to be feeling well, appetite slowly improving, continues to have nephrostomy tubes in places, last changed 23 weeks ago draining clear yellow urine. Denies fever, chills, headaches, chest pain, sob, abdominal pain/back pain.   8/14/24 - on Xtandi since early Jan 2024 for mCRPC, decreased to 80mg daily as of May 2024 for weight loss. Overall feeling well, ongoing fatigue is stable. Ongoing hot flashes.  Appetite is stable, weight is up a couple lbs since last visit. Diarrhea yesterday "all day" with >6-7 episodes of soft/loose stool, he took PRN Imodium yesterday, no more stool today, unsure if it was r/t something he ate. Urine from nephrostomy tubes is "good", no hematuria. Denies fever, chills, night sweats, headache, dizziness, chest pain, palpitations, SOB, cough, nausea/vomiting, constipation, abdominal pain, LE edema, bleeding, muscle or joint pain/weakness.  9/17/24 - on Xtandi since early Jan 2024 for mCRPC, decreased to 80mg daily as of May 2024 for weight loss. Feeling "alright", stable fatigue. Ongoing hot flashes are tolerable. Appetite is stable. Having intermittent diarrhea approx every other week with approx 3 episodes when it occurs but resolves with PRN Imodium 1-2 tabs, having normal BMs in between, unsure if diarrhea is r/t eating spinach. Bilateral nephrostomy tubes in place. Denies fever, chills, night sweats, headache, dizziness, chest pain, palpitations, SOB, cough, nausea/vomiting, constipation, abdominal pain, hematuria, LE edema, bleeding, muscle or joint pain/weakness.  10/16/24 - on Xtandi since early Jan 2024 for mCRPC, decreased to 80mg daily as of May 2024 for weight loss. Ongoing fatigue is stable. Ongoing hot flashes are tolerable. Appetite is "alright". Occasional diarrhea is improved, only 2 episodes of diarrhea over the past month. Bilateral percutaneous nephrostomy tubes in place, still passes some urine through his penis. Ongoing intermittent arthritic pains of the shoulders and knees are stable since before cancer diagnosis. Denies fever, chills, night sweats, headache, dizziness, chest pain, palpitations, SOB, cough, nausea/vomiting, constipation, abdominal pain, dysuria, hematuria, incontinence, LE edema, bleeding.   11/14/24: presents for follow up and management of metastatic CRPC on ADT + reduced dose enzalutamide in setting of weight loss and fatigue. His PSA has been noted to be rising over the last several visits from a manjinder of 8.13 to 15.3 at last visit on 10/16/24.   12/2/24: He presents for follow up and cycle 1 of docetaxel 75mg/m2 after his PSA was noted to rise further from 15.3 to 21.4. He has stopped enzalutamide.  He denies any new significant complaints since last visit.   12/23/24: He presents for follow up and cycle 2 of docetaxel 75mg/m2. He notes transient diarrhea and increased fatigue after cycle 1. Also notes alopecia and increased blood pressure when he takes steroid premedication. Denies fever, chills, night sweats, bony pain.  1/13/25:  He presents for follow up and cycle 2 of docetaxel 75mg/m2. He denies any significant change in symptoms. He has intermittent leaking from nephrostomy sites, increased on days of tx.  Notes continued fatigue. Also notes alopecia and increased blood pressure when he takes steroid premedication. Denies fever, chills, night sweats, bony pain.

## 2025-01-14 NOTE — REVIEW OF SYSTEMS
[Fatigue] : fatigue [Joint Pain] : joint pain [Hot Flashes] : hot flashes [Fever] : no fever [Chills] : no chills [Night Sweats] : no night sweats [Chest Pain] : no chest pain [Palpitations] : no palpitations [Lower Ext Edema] : no lower extremity edema [Shortness Of Breath] : no shortness of breath [Cough] : no cough [Abdominal Pain] : no abdominal pain [Vomiting] : no vomiting [Constipation] : no constipation [Dysuria] : no dysuria [Incontinence] : no incontinence [Joint Stiffness] : no joint stiffness [Muscle Pain] : no muscle pain [Muscle Weakness] : no muscle weakness [Dizziness] : no dizziness [Easy Bleeding] : no tendency for easy bleeding [Easy Bruising] : no tendency for easy bruising [de-identified] : occasional

## 2025-01-14 NOTE — HISTORY OF PRESENT ILLNESS
[Disease: _____________________] : Disease: [unfilled] [T: ___] : T[unfilled] [N: ___] : N[unfilled] [M: ___] : M[unfilled] [de-identified] : This 66-year-old male was first seen in consultation on July 20, 2016. In August of 2013 his PSA was 4.3. In September 30, 2013 it was 4.9. He underwent a biopsy in March 5, 2014 revealing Ade 7 (3+4) disease. He underwent external beam radiation therapy for a total dose of 8100 cGy in 45 fractions from May 25 and total July 31 of 2014. He subsequently experienced PSA failure and brachytherapy was considered. A CT scan was performed as part of that evaluation revealing a filling defect in the left ureter. In March 2016, he presented to the emergency room with a creatinine of 13.7 and potassium of 8.9. Bilateral nephrostomy tubes were placed. He was found to have GI bleeding and a colonoscopy revealed evidence of radiation proctitis. He has been on Lupron. 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 His initial staging was stage II, [id number].    The original pathology was reviewed prior to radiation. The left base revealed adenocarcinoma the prostate, Ade score 7 (3+4) involving 2 out of 2 cores, 40% of each core, with perineural invasion. The left mid zone revealed adenocarcinoma the prostate, Ade score 7 and (4+3) involving 50% of each of 2 cores, with perineural invasion noted. The left apex had 3 of 3 cores involved. One core was Ade 7 (3+4) involving 100% of the core. The other 2 cores had a Ade 6 (3+3) in less than 5% of 2 cores. Perineural invasion was identified as well.  Posttreatment biopsy revealed the presence of adenocarcinoma within the prostate gland. The PSA was 11 at the time of the biopsy.  Feels well at initial consultation. Started Lupron in January 2016 before the renal failure/obstruction. No pains. Fatigue at times. Appetite good, stable weight. Lost weight with illness in March, typical weight prior was 210, left hospital at 170. Weight stable. He was transfused multiple times. Some urine through penis recently. Has bilateral percutaneous nephrostomies. No blood, dysuria, no incontinence. Nocturia x 2-3 at this time. Right sided nephrostomy still has output, small amount in left bag. Due for change of tubes in September. Had radiation proctitis and has laser treatment. Sees GI in follow-up next month.   10/18/16...Had a colonoscopy, showed radiation colitis. No further rectal blood noted. Urine flow mostly from PCNs, some from penis. No dysuria, some  hematuria noted at times. Appetite is normal. No weight loss. No fatigue. Hot flushes occur daily. .  4/18/17...last scans 9/26.  Saw PCP and had PSA done, shows slight increase over manjinder. he was given ferrous sulfate to take for anemia. No pains. Urine flow is mostly thru PCNs. Still has occasional blood in the urine, noted in the bag. Has less volume form the left side.  Hot flushes occur a few a day, 15-20 minutes, occ sweats. Appetite is good, no weight loss. Some fatigue noted. No edema. Occ blood with stools, saw Dr Grossman in September, due for follow up. has not had major bleeding recently.   8/1/17...Surya states that he is feeling good, his urine flow is strong, slight increase, no pains anywhere, he gets hot flashes a "few  day, they are coming." He has some minor fatigue, He has percutaneous nephrostomies, He follows with Dr. Dick, no obvious hematuria, no breast tenderness.  11/14/17...Received Lupron from Dr Dick a few weeks ago. PSA was 0.02 on 8/1/17. He gets hot flashes a couple times a day, He has b/l nephrostomies, is able to pass urine through his penis. He denies hematuria, dysuria He states appetite is good. He gets some fatigue, He denies any pains.  4/17/18...Received Lupron from Dr Dick Jan 23, 2018. Not seen since November 17, due to illness. 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 Feels well. No pains noted. Some fatigue. Appetite is good. Reports that he has diarrheal for 2-3 months, not daily. He had loose stools this AM, took Imodium. Diarrhea occurs 1-2 days a week, the n stops. Some flatus, no cramps. No blood in the stool. Urine flow is via nephrostomy, minor amount thru penis, no dysuria, no incontinence. No blood in urine. Hot flushes most days.    Minor fatigue. Appetite normal.   7/17/18...On CAB. Feels "okay". No pains. Has bilateral nephrostomy tubes, due for change in August. No recent infections, occasionally passes urine via penis, no blood, no dysuria. Still has hot flushes, daily, more in the winter. Some fatigue, no physical impairment. Appetite is good. Weight stable. Diarrhea persists, on and off, not daily. Watery at times. takes Imodium on occasion. Occ small amount of blood with hard stools, secondary to radiation proctitis.   10/23/18...Feels "okay". No pains. Urine flow is minimal, most comes out of the nephrostomy tubes, nocturia -none. No blood, no dysuria. No incontinence. Hot flushes occur, day and night. Appetite is good, no weight. No dyspepsia, no nausea, no vomiting. Diarrheal stools occur episodically, every 2-3 weeks. No blood in the stool. Usually 1-2 BMs per day. Occasionally notes blood on toiler paper with hard stool. has RT proctitis.   2/5/19...Feels well, no pains. has hot flushes frequently, daily. Has some sweats with them at night. Appetite is good, no weight loss. Urine flow is minimal thru penis, nocturia does not occur. Most urine form PCNs, last catheter change January 2019, by IR at [facility]. Occ minor blood in the urine, when the catheter is loose. No back pains, no bone pains. Occ fatigue.   5/7/19...Had a calcium of 11.1 last visit. Called him and told to stop calcium and vit D. Repeat 3 weeks later was normal. Continues on Lupron and Casodex. Feels well. Hot flushes every day. He has some fatigue, unchanged. Appetite is good, no weight loss. Urine is minimal from penis, has bilateral PCNs. No bone pains. No edema.   8/8/19...Feels "all right", as a URTI. No pains noted. Hot flushes multiple time a day. Fatigue is present, mild. Urine flow is 'good', PCN bilateral, most flow form the right. Some flow thru penis, voids 1-2 times a day. NO blood, no dysuria. Due for PCN change next week. Appetite is good, no weight change.  10/29/19...Local failure prostate cancer, on CAB. Feels well. No pains., Urine flow is good. Has bilateral PCNs. Most of urine goes to PCNs. Occ small amount of blood in the bag. Voids 2 times a day. Hot flushes, daily, with sweats on occasion. has fatigue at times. Appetite is good, no weight loss. NO leg edema. No bleeding from the RT proctitis recently  1/28/20...Stopped bicalutamide after last visit. Last seen 3 months ago. PSA on 1/7 was 0.27, thus continued to rise somewhat. NO bone pains. Hot flushes daily, occ sweats. Appetite is good, no weight loss. No fatigue of note. Urine flow is good, but most flow is into the PCNs. Last change of PCNs was December after he had some leaking on one side. No hematuria, no dysuria. No incontinence.   5/6/20...Verbal permission granted for telephone services by patient, Surya Cervantes, on 5/6/20 at 1:35 PM.   Fells well. No pains noted. Nephrostomy tubes in place, replaced last week. Appetite is good, no weight loss. Hot flushes daily. He has chronic fatigue complaints. No leg edema noted. Urine through penis is minimal, only when tubes become clogged. No blood in the urine. No nausea, no vomiting. Has diarrhea at times, once every 2-3 weeks. No back pains noted. No fevers, no chills., No cough, no sputum.   6/17/20...Verbal permission for telephone contact was granted by the patient, Surya Cervantes, on June 17, at 4 PM. Feels "all right". NO pains. Hot flushes, daily, occ minor sweats. Fatigue is present, rated mild, occasional naps. Appetite is good, no weight loss. No edema. Urine flow is minimal from penis, has bilateral PCNs. No blood. No incontinence. No recent infections. No cough NO CHEN. No F/C. Helps in care of his mother, watches TV, not very active. Jeana from [employer].   7/29/20...Verbal permission for telephone contact was granted by the patient, Surya Cervantes, on July 29, 2020 at 3:10 PM.  Started tawanda/pred on June 16, 2020. Feels  "pretty good". He was having diarrhea prior and he no longer had diarrhea.   No pains at this time. No edema. No headaches. Checks BP on occasion.  BPs have been "normal", he will check often. Urine flow "about the same",. Most urine comes from PCNs. No nocturia, no  hematuria, no dysuria. Appetite is normal, thinks he may have lost a few pounds. No cough, No CHEN. NO F/C. Hot flushes occur daily. No change. Mild fatigue.  8/26/20...On abiraterone and prednisone since June, PSA dropped significantly. taking meds correctly. Feels 'all right", no pains. Fatigue is present, as before, "not terrible", takes naps. Slightly more fatigue than before. Appetite is good, but lost weight. eats with his mother, she eats less and so does he. No nausea, no vomiting. No headaches, no edema of note. Most of urine comes from PCNs, Urine from penis is minimal, does not get up at night. No blood, no dysuria. No incontinence. No blood in the PCN bags.  Has leg cramps daily, particularly in the AM. Dr Angulo administers Lupron, due for Rx on 9/1/20.   9/22/20...He is seen in the office today in follow-up. He's been on abiraterone and prednisone since June. He had elevated transaminases on September 3 and his abiraterone was subsequently held. He will have repeat blood work done today after today's visit. He states that he feels "all right." His appetite is stated to be "all right." His weight has been stable. He denies dyspepsia, nausea or vomiting. He denies constipation or diarrheal stools. There is no cough or shortness of breath. He denies skin rashes or pruritus. There are no complaints of pains at this time. Fatigue is present but it does not impair his daily activities. He takes occasional naps during the day. He denies arthralgias or myalgias. There are no headaches or dizziness. He has bilateral percutaneous nephrostomy tubes draining yellow urine. There is no blood in the percutaneous nephrostomy bags. Most of the urine comes from the percutaneous nephrostomy tubes with minimal amount of urine coming out through the penis. There is no nocturia. There is no hematuria or dysuria. There is no incontinence. There is no edema in the extremities. He has occasional hot flashes but felt it has improved these past few days. He denies fevers or chills. He infrequently checks his blood pressure at home. He would get blood pressure readings ranging from 150s-160s systolic over 80s-90s diastolic. He remains independent in his activities of daily living.   10/27/20...Verbal permission for telephone services granted by the patient,  Surya Cervantes on October 27, 2020 at 3:48 PM.  Stared abiraterone in June. Feels "pretty good". NO increased edema. No headaches. Hot flushes are less. Appetite is good, no weight loss. Has PCNs, has urine form the penis at times. NO blood in the urine. No dysuria. No nocturia. No incontinence. No bone pains noted. Fatigue  RTs, takes a nap at times. Cares for his elderly mother at home.   11/25/20...Verbal permission for telephone services granted by the patient,  Surya Cervantes on November 25, 2020 at 1:50 PM On 1/2 dose tawanda due to transaminases. No pains, feels "all right". Some fatigue. Urine flow form penis in minimal, has bilateral PCNs. Occasional small amount of blood in the right PCN tube. Appetite is good, no weight loss. No cough, no CHEN. Hot flushes daily. No headaches, no edema. BP monitored at home, 151/89, pulse 95.   12/23/20...Verbal permission for telephone services granted by the patient,  Surya Cervantes on December 23, 2020 at  1:32 PM.  Feels well. No pains noted. Urine flow RTS. Has PCNs, minimal urine via penis on occasion. No blood in urine or tubes of note. Appetite is good, no weight loss. NO edema of the legs. NO headaches, no dizziness. No cough, no CHEN. No F/C. Hot flushes occur daily, less often. Due Lupron next month from Dr Angulo. Mild fatigue, take a nap. Independent in ADLs, cares for his mother. BP at home 138/90, pulse 71. Left arm 147/92  1/28/21...Verbal permission for telephone services granted by the patient,  Surya Cervantes on January 28, 2021 at 315 PM.  Surya reports that he has no pains.  There is no significant urine within his blood.  In the interim, he had to have the left percutaneous nephrostomy tube changed as there was pus present.  They had some difficulties apparently.  The tube was clogged and he was unable to change it over a guidewire.  He was not treated with any antibiotics.  He does have some urine from the penis at times.  There is no fevers or chills.  His appetite is good and there is no weight loss.  He reports that his weight is 190 pounds today.  He says his blood pressure was 146/92 in the left arm and 145/85 on the right arm.  The pulse rate was 75.  He reports no edema or headaches.  There are some hot flushes.  2/25/21...Verbal permission for telephone services granted by the patient,  Surya Cervantes on 2/25/21 at 3:10 PM Feels "all right". NO pains. He was having issues with PCNs clogged, had to have removal and reinsertion. It was the right side this week, done on Tuesday. Told to flush it daily rather than every 3 days. Appetite is good, no weight loss, no edema. No cough, no CHEN. BP is monitored, right side 139/84 today, left 134/82, pulse 79. No headaches, no significant fatigue, does nap on occasion. NO leg weakness. No N/V/D/C. Minimal urine from penis. There was more flow when the dysfunction occurred. NO blood in the urine, no dysuria.   4/7/21...  10/26/21....Last evaluation was 4/7/21, by telephone. Completed antibiotics. Was in rehab for 6 weeks. Strength is good, no pains. Appetite is good, gained some weight. urine flow is good, no incontinence, very little urine from penis, mostly from the tubes. No headaches, no dizziness, no balance issues, No falls. Hot flushes occur. Lives with mother, helps her. No edema at this time.   From neurosurgery, this summary of events....70 yo Male w/ PMHx of HTN, prostate CA (on chemo, s/p radiation), b/l nephrostomy tubes, presented  to [facility] ED on 6/14/21 with weakness and confusion. In ED foulurine in b/l nephrostomy bags noted, tachycardic, and hypotensive. s/p IVF and levo drip for septic shock, developed fluid overload with pitting edema and decreased  EF. Imaging of the spine concerning for discitis and osteomyelitis c-spine with T1 inflammation/signal changes.   Hospital coursed remarkable for BC + Klebsiella and strep anguinous. Started on vanco/zosyn 6/14. As per ID switched to ceftriaxone 2 g on 6/17. Hospitalization c/b ASHLEY on CKD with right hydronephrosis. IR consulted- no intervention, both nephrostomy tubes flushing adequately. Nephrology  consulted, started on stress dose steroids/hydrocortisone 50 mg q6 and Midodrine. CTAP showed Sludge in gallbladder.  Patient  underwent C6-7 corpectomy and posterior fusion C4-T2 on 6/29/21. Hospital course c/b tachycardia, fever with low O2 sats. Chest CT negative for  PE, lower extremity Dopplers negative. s/p PICC, discharged to inpatient rehab on 7/12/21 and then was discharged home after completion of rehab.   Last Eligard was August with Dr Angulo, 30 mg dose.   11/30/21...on tawanda/pred since June 2020. Feels  "all right". Has hot flushes and fatigue. Does not prevent activities. He falls asleep easily if he sits in a chair. Independent in ADLs, cares for his mother. No infections recently, No F/C. No recent adventures with with his PCNs. No blood in urine, occ urine via penis. Appetite is good, gained 2.2 kilos. Edema decreased. No chest pain/pressure. No cough/ CHEN. No headaches, no dizziness. No N/V/D/C.   1/11/22....telehealth today. Feels  "all right". No pains of note. t flushes daily. Has some fatigue as well. No edema noted. NO cough/CHEN No chest pain/pressure. No F/C. Appetite is good, no weight loss. Weighs 176. Checks BP at home, 142/98 RP 72 today, L arm, 142/91. Takes terazosin at night. Sees PCP in March. Needs to call PCP for tighter BP control. NO headaches, no dizziness. No F/C. No hematuria, has bilateral PCNS, has some small amount of urine from the penis. Had change of PCNs last week. Independent in ADLs.   2/8/22 - telehealth visit today. Continues on abiraterone 500mg daily + prednisone. Reports his BP today was 143/94 with HR of 74. Overall feeling "alright". Moderate fatigue, takes naps during the day. Occasional hot flashes. Appetite is good, weight today is 180lbs, he attributes recent weight gain to eating better after being discharged from the hospital. Mild ptosis of left eyelid comes and goes and has reportedly been stable for his "whole life". Bilat percutaneous nephrostomy tubes in place, no hematuria. Ongoing mild BLE edema is reportedly stable. Ongoing intermittent arthritis pains of left knee and right shoulder are stable. Denies fever, chills, headache, dizziness, balance issues, eye pain/problems, mucositis/odynophagia, chest pain, palpitations, SOB, cough, nausea/vomiting, diarrhea/constipation, abdominal pain, hematuria, rash/pruritus, neuropathy, bleeding, weakness, anxiety/depression.  3/17/22...tawanda/pred started mid June 2020. PSA was 11.6 at start. Feels "all right". No pains. No major issues with the PCNs, having a change done next week. Appetite is good, no weight loss. No cough, No CHEN. Some leg edema. No chest pain/pressure. Urine from penis is relatively little. No dysuria, no blood. has calcium oxalate excretion, which causes his tubes to be clogged. Notes some bruising. NO headaches, no dizziness, no balance issues. No F/C. No N/V/C, some minor diarrheal stools at times. Takes Imodium as as needed. Fatigue is present at times, has hot flushes occur multiple times a day.   4/20/22 - continues on abiraterone (500mg daily) + prednisone since June 2020 for mCRPC. Overall feeling "alright", ongoing mild fatigue is stable. Intermittent hot flashes are tolerable. Mild night sweats during the summer when it gets warmer. Ongoing mildly productive cough which he's had for "a long time". Occasional diarrhea that doesn't last more than a day. Bilateral percutaneous nephrostomy tubes in place, passing minimal urine from his penis. Occasional hematuria especially from right nephrostomy bag, clear today. Ongoing mild BLE edema. Ongoing chronic left knee swelling and pain 2/2 arthritis. Ongoing right shoulder pain 2/2 arthritis as well. Denies fever, chills, headache, dizziness, balance issues, eye pain/problems, mucositis/odynophagia, chest pain, palpitations, SOB, nausea/vomiting, constipation, abdominal pain, rash/pruritus, bleeding, muscle pain/weakness  5/24/22 - continues on abiraterone (500mg daily) + prednisone since June 2020 for mCRPC. BP today is 164/95, asymptomatic. BP at home has been 150s/80-90s. On occasion the DBP has been >100. Overall feeling "alright", ongoing fatigue is stable. Ongoing intermittent hot flashes. Appetite is good. Vision is changing in his left eye, he has an appt with [facility] this Thurs. Bilateral nephrostomy tubes in place, occasional hematuria at times resolves after 2-3 days. Ongoing trace edema of BLEs. Intermittent pruritus around nephrostomy tube dressings. Denies fever, chills, night sweats, headache, dizziness, balance issues, mucositis/odynophagia, chest pain, palpitations, SOB, cough, nausea/vomiting, diarrhea/constipation, abdominal pain, dysuria, incontinence,  rash, neuropathy, bleeding, muscle or joint pain/weakness.   6/21/22 - continues on abiraterone, half dose + prednisone since June 2020 for mCRPC. PSA 11.6 at start of treatment in June 2020, manjinder PSA at o.o5 August 2021, slow rise, recent PSA in May 2022 was 0.17.  feels  "all right". No pains noted. Has bilateral PCNs, changed last week. No infections, no F/C. Some fatigue, always, no worse. Appetite is good, no weight loss. NO headaches, no dizziness, no balance issues. Some edema noted, as before. No chest pain/pressure. NO bone pains. No N/V/D/C. Small amounts of urine via the penis. Hot flushes daily, multiple times. No cough, no CHEN.   7/28/22 - continues on abiraterone 500mg + prednisone since June 2020 for mCRPC. Overall feeling "alright", ongoing mild fatigue for which he occasionally takes a nap once a day. Ongoing hot flashes are tolerable. Appetite is good. Occasional cough. Bilateral percutaneous nephrostomy tubes in place passing most of the urine through the bags but still passing minimal urine via penis, no nocturia. Trace edema of BLEs. Notes intermittent cramping of hands which may be arthritis as it improves with movement. Has ongoing intermittent right buttock pain radiating down to the knee, present for the past year, pain is worse with sitting for a long time, improves with changing positions, max pain is 3/10 not requiring medication. Denies fever, chills, night sweats, headache, dizziness, balance issues, eye pain/problems, mucositis/odynophagia, chest pain, palpitations, SOB, nausea/vomiting, diarrhea/constipation, abdominal pain, dysuria, hematuria, incontinence, rash/pruritus, bleeding, weakness.   10/11/22 - continues on abiraterone 500mg daily + prednisone since June 2020 for mCRPC. Neurosurgical notes reviewed...."S/P cervical spinal fusion, 70 year old male 15 months pos op C6-7 corpectomy and posterior fusion C4-T2 on 6/29/21 with Dr. Tiffanie Martinez". Now seeing Dr Resendiz. Overall, feels "all right". No pains noted. urine flow is "fine", no incontinence, most urine still out PCNs, some from the penis. No blood, no dysuria. Hot flushes daily.  Some fatigue, RTS. Appetite is good, no weight loss. No headaches, dizziness, no balance issues, No paresthesias. Occ cough, sputum is white. No CHEN. Tubes to be changed next week. No N/V/D/C.  Some edema , improved. No chest pain/pressure  11/9/22 - continues on abiraterone 500mg daily + prednisone since June 2020 for mCRPC which he is tolerating well. Pt reports feeling generally well. No new complaints. Bilateral nephrostomy tubes in place, denies hematuria . Ongoing trace edema of BLEs. Intermittent pruritus around nephrostomy tube dressings. Denies fever, chills, night sweats, headache, dizziness, balance issues, mucositis/odynophagia, chest pain, palpitations, SOB, cough, nausea/vomiting, diarrhea/constipation, abdominal pain, dysuria, incontinence, rash, neuropathy, bleeding, muscle or joint pain/weakness. Appetite reported as good. No changes in medications  12/7/22 - continues on abiraterone 500mg daily + prednisone since June 2020 for mCRPC.  gets his Eligard 45 tomorrow form Dr Angulo. feels "all right". No pains noted except for arthrtic complaints. Appetite is okay< dropped a few pounds. usual edema, no change. No headaches, NO dizziness. Most of the urine comes from PCNs, changed every 6 weeks due to prior infections. NO fevers, no chills. Hot flushes occur, 1-2 times a day. Notes a bit more urine thru penis lately. Occ cough, no CHEN. No chest pain/pressure/palpitations. NO N/V/D/C. Mild fatigue. Independent in ADLs.   1/4/23 - continues on abiraterone 500mg daily + prednisone since June 2020 for mCRPC. Overall feeling "alright", ongoing fatigue is stable. Ongoing hot flashes are tolerable. Appetite is "ok". Ongoing occasional productive cough is stable. Bilateral perc nephrostomy tubes in place, continues to pass some urine through the penis, no nocturia. Denies fever, chills, night sweats,headache, dizziness, balance issues, eye pain/problems, mucositis/odynophagia, chest pain, palpitations, SOB, nausea/vomiting, diarrhea/constipation, abdominal pain, dysuria, hematuria, incontinence, LE edema, rash/pruritus, bleeding, muscle or joint pain/weakness.   2/13/23..... continues on abiraterone 500mg daily + prednisone since June 2020 for mCRPC. treated with cephalexin and developed rash, red skin, pruritus. Chart labeled as allergy. Hospitalization was brief. sudden onset of symptoms, Hd diarrheal stools as well. Feels  "pretty good", except for residual pruritus. No pains noted.  Has tube change set for next week. Appetite has retuned. No N/V. Diarrhea resolved, NO taste alteration. No headaches, no dizziness. had some edema. resolved. No chest pain/pressure/palpations.  has his usual fatigue and hot flushes. Cough, chronic, with clear sputum. No CHEN/SOB. No fevers of chills since discharge  Hospital Course:  Discharge Date	01-Feb-2023  Admission Date	29-Jan-2023 17:52  Reason for Admission	Syncope  Hospital Course	  69 yo M w/ HTN, gout, prostate cancer s/p b/l nephrostomy tubes, on abiraterone, admitted w/ sepsis 2/2 UTI, improved on antibiotics.  Sepsis secondary to UTI.  was febrile, tachycardic in the ED, met criteria for sepsis on admission  - U/A w/ +nitrite, large LE, prior cultures with klebsiella and E. coli  - current Ucx >3 organisms suggestive of collection contamination  - received IV zosyn (1/29/23-2/1/23),  Bcx negative, discharge on oral cephalexin to complete 10 days for complicated UTI (end date 2/7/23)  - sepsis resolved (currently afebrile, without leukocytosis, tachycardia resolved)  - IR consulted nephrostomy tubes in position and draining well, no indication for exchange at this time.   Gastroenteritis.   had reported nausea/vomiting/diarrhea after eating fried rice w/ beef  - s/p IV fluids, now resolved.   Syncope.   reports syncopal episode likely 2/2 dehydration from gastroenteritis  - s/p IV fluids, EKG sinus rhythm, troponin indeterminate but no increased  - reports symptoms resolved, ambulating around unit independently (advised call don't fall).   3/21/23..... continues on abiraterone 500mg daily + prednisone since June 2020 for mCRPC. treated with cephalexin and developed rash, red skin, pruritus. Chart labeled as allergy. Overall, he feels "all right". No pains of note. Appetite is good, no weight loss. No edema. No chest pain/pressure/palpitations. some cough, no CHEN. Hot flushes daily. Fatigue is present.  Does yoga, runs in place. No HA, no dizziness, no falls. Independent in ADLs. no fevers, no chills. Minimal urine from penis, no dysuria. PCNs are changed every 6 weeks, no blood in tubes. No N/V/D/C.   4/17/23.....  on abiraterone 500mg and prednisone since June 2020 for mCRPC. Prior transaminitis led to decrease in dose. "doing okay". Minor discomfort on left side near the PCN. Has change every 6 weeks at this time. Appetite is good, no weight loss. No N/V/D/C. Has some urine form the penis, noted if PCNs pinch. No blood in the urine, no hematuria. Hot flushes as before, about 3 times a day.  fatigue RTS. No cough, no CHEN, No fevers, no chills. No edema, no chest pains,pressure/palpitations  Exercises every AM.   5/16/23....continues on abiraterone 500mg and prednisone since June 2020 for mCRPC. Prior transaminitis led to decrease in dose. Eligard next month with Dr Angulo. Overall, feels "all right". No pains noted. Appetite is good, no weight loss. No N/V/D/C. Urine from penis on occasion, empties bladder before bed. No hematuria, no dysuria. No fevers, no chills. PCNs were changed last week. No headaches, no dizziness, no balance issues.  Exercises daily, walking and running. Occ cough, no CHEN. No chest pain/pressure/palpitations. Hot flushes remains. No fevers, no chills  6/12/23.... on abiraterone 500mg and prednisone since June 2020 for mCRPC. Prior transaminitis led to decrease in dose. Mara with Dr Angulo. Feels "well". NO pains, except for radicular pain in right thigh, present for about 6 weeks, constant. uses a topical, icy-hot. Does not awaken him, better when he is lying down, aggravated by ambulation. Appetite is normal. No weight loss. NO fevers, no chills. NO fatigue. No headaches, no dizziness, no paresthesias. No cane. No falls. No cough, no CHEN. NO N/V/D/C. Urine from PCNs. Some from the penis, no hematuria, no dysuria. No nocturia. Hot flushes daily. No edema. NO chest pain/pressure/palpitations. No back pains. Independent in ADLs  7/18/23 - continues on abiraterone + prednisone (500mg daily d/t transaminitis) for mCRPC since June 2020. Ongoing fatigue after periods activity, naps once a day. Ongoing hot flashes are tolerable. Appetite is good. Occasional cough. Bilateral perc nephrostomy tubes in place, scant blood if the tubing moves around but not persistent. Ongoing pain in right buttock radiating down posterior thigh/leg, no pain with lying down, pain is worse with sitting for a prolonged time and improves with walking, max pain is 8/10. Not taking any pain medication as the pain eases up when he gets up and walks around.   8/16/23 - continues on abiraterone + prednisone (500mg daily d/t transaminitis) for mCRPC since June 2020. Overall feeling "alright", ongoing fatigue is stable. Ongoing frequent hot flashes, tolerable. Appetite is "alright". Ongoing occasional cough. Perc nephrostomy tubes exchanged 2wks ago, occasional transient hematuria which he attributes to accidentally pulling on the tubes. Ongoing stable intermittent right buttock pain radiating down posterior thigh down to the calf, improves with movement and worse with sitting for a long time, max pain is 5/10. Using a topical cream called "Australian Dream" which has been helpful.   9/20/23 - continues on abiraterone + prednisone (500mg daily d/t transaminitis) for mCRPC since June 2020. last PCN change was last week. On a 6 week change protocol. Feels "not bad", chronic leg pains, form right buttock, down the leg. Passing urine via penis, very small amounts. No blood, no dysuria. No nocturia. No incontinence, voids about 2 times a day. Hot flushes, every day, no sweats. Fatigue is present, naps, feels refreshed afterwards. Does yoga, stretching, walking, lifts some weights, daily basis. Appetite is good, no weight Kenji Occ cough, no CHEN. No edema, no chest pain/pressure.   10/31/23 - continues on abiraterone + prednisone (500mg daily d/t transaminitis) for mCRPC since June 2020. Overall feeling "alright", ongoing fatigue is stable. Occasional hot flashes. Appetite is "alright". Perc nephrostomy tubes in place, passing minimal urine through his penis. Ongoing pain in right buttock that radiates down the posterior leg, worse in the morning when first waking up and worse with sitting for a prolonged period, improves/resolves with activity. Denies fever, chills, night sweats, headache, dizziness, balance issues, chest pain, palpitations, SOB, cough, nausea/vomiting, diarrhea/constipation, abdominal pain, dysuria, hematuria, incontinence, LE edema, bleeding.   12/5/23 - continues on abiraterone + prednisone (500mg daily d/t transaminitis) for mCRPC since June 2020. Overall feeling well, some fatigue but remains active and exercising regularly. Ongoing hot flashes are tolerable. Appetite is good. Bilateral percutaneous nephrostomy tubes in place, no hematuria. Ongoing right buttock pain that radiates down the posterior leg, worse in the morning and improves with activity, max pain is 4-5/10, declines referral to PM&R. Denies fever, chills, night sweats, headache, dizziness, balance issues, chest pain, palpitations, SOB, cough, nausea/vomiting, diarrhea/constipation, abdominal pain, LE edema, bleeding.  1/2/24 - on abiraterone + prednisone since June 2020, discontinued 12/26/23 for POD seen on 12/21/23 bone scan. Feeling well, no significant fatigue. Remains active and doing exercises daily. Occasional hot flashes are tolerable. Bilateral percutaneous nephrostomy tubes in place, no hematuria. Ongoing right buttock pain that radiates down the posterior leg, worse in the morning and improves with activity, max pain is 7-8/10, pain is stable. Denies fever, chills, night sweats, headache, dizziness, balance issues, chest pain, palpitations, SOB, cough, nausea/vomiting, diarrhea/constipation, abdominal pain, LE edema, bleeding.   2/14/24 - Xtandi started early Jan 2024 for mCRPC. Over the past week he's been very fatigued. Ongoing hot flashes. Appetite is decreased and not eating as much as he used to, weight is down 11lbs over the past 6wks. Stable cough since before start of Xtandi. Percutaneous nephrostomy tubes in place, no hematuria. Notes some pains in the hands, knees, and ankles which he attributes to arthritis. Ongoing pain in right buttock radiating down posterior leg, pain is worse in the AM when first waking up and improves with activity/walking, pain waxes and wanes, max pain is 7/10 but not taking any medication for this. Denies fever, chills, night sweats, headache, dizziness, balance issues, chest pain, palpitations, SOB, nausea/vomiting, diarrhea/constipation, abdominal pain, LE edema, bleeding.   3/14/24 - continues on Xtandi since early Jan 2024 for mCRPC. Overall feeling ok, fatigue is a little improved which he believes is because his BP hasn't been as low as it was in the past. BP at home has been 120's/70-80's in the mornings. Ongoing hot flashes are not as intense. Appetite is decreased, weight is down 7lbs over the past month, but overall down 18lbs over 2 months. Urine from bilateral percutaneous nephrostomy tubes is "good", no hematuria. Ongoing pain in right buttock down the posterior leg, pain is worse with sitting for a prolonged period, improves with getting up and moving around, not interfering with his sleep, not needing any medication for pain. Denies fever, chills, night sweats, headache, dizziness, balance issues, chest pain, palpitations, SOB, cough, nausea/vomiting, diarrhea/constipation, abdominal pain, LE edema, bleeding.  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 NO AAW benefit. Started tawanda/pred mid June 2020, discontinued 12/26/23 for disease progression. Xtandi started early Jan 2024, decreased to 80mg daily in May 2024 d/t weight loss. Switched to docetazel in 12/2024 due to PSA progression. [de-identified] : 4/16/24 - continues on Xtandi since early Jan 2024 for mCRPC. Excess lambda light chains on last blood work, no M-spike. Feels well. no Pains. Has to some fatigue, naps during the day. Appetite is "good", No N/V/D/C. No cough, no CHEN. NO chest pain/pressure. No headaches, no dizziness. No balance issues, minimal, walks with a cane. No falls. Urine  flos via tubes, no recent infections. NO blood in urine. Some minor urine amounts from penis. No nocturia, no dysuria. Hot flushes occur, less than before, 1-2 times a day. No edema noted. Independent in ADLs.  CDX done, no targets  5/15/24 - continues on Xtandi since early Jan 2024 for mCRPC. Feeling "alright". Ongoing fatigue, naps 1-2x/day. Occasional hot flashes. Decreased appetite, not eating as much as before, drinking 1-2 Ensure per day, weight is down 5lbs over the past month. Bilateral percutaneous nephrostomy tubes in place, no hematuria. Ongoing arthritic pains of the shoulders and hands at times. Denies fever, chills, night sweats, headache, dizziness, balance issues, chest pain, palpitations, SOB, cough, nausea/vomiting, diarrhea/constipation, abdominal pain, LE edema, bleeding.   6/12/24 - continues on Xtandi since early Jan 2024 for mCRPC, decreased to 80mg daily as of May 2024 for weight loss. Ongoing fatigue is stable. Ongoing hot flashes. Appetite is "a little better". Urine from nephrostomy tubes is clear yellow, no hematuria. Denies fever, chills, night sweats, headache, dizziness, balance issues, chest pain, palpitations, SOB, cough, nausea/vomiting, diarrhea/constipation, abdominal pain, LE edema, bleeding, muscle or joint pain/weakness.  7/11/24 - on Xtandi since early Jan 2024 for mCRPC, decreased to 80mg daily as of May 2024 for weight loss. Patient accompanied by brother, reports to be feeling well, appetite slowly improving, continues to have nephrostomy tubes in places, last changed 23 weeks ago draining clear yellow urine. Denies fever, chills, headaches, chest pain, sob, abdominal pain/back pain.   8/14/24 - on Xtandi since early Jan 2024 for mCRPC, decreased to 80mg daily as of May 2024 for weight loss. Overall feeling well, ongoing fatigue is stable. Ongoing hot flashes.  Appetite is stable, weight is up a couple lbs since last visit. Diarrhea yesterday "all day" with >6-7 episodes of soft/loose stool, he took PRN Imodium yesterday, no more stool today, unsure if it was r/t something he ate. Urine from nephrostomy tubes is "good", no hematuria. Denies fever, chills, night sweats, headache, dizziness, chest pain, palpitations, SOB, cough, nausea/vomiting, constipation, abdominal pain, LE edema, bleeding, muscle or joint pain/weakness.  9/17/24 - on Xtandi since early Jan 2024 for mCRPC, decreased to 80mg daily as of May 2024 for weight loss. Feeling "alright", stable fatigue. Ongoing hot flashes are tolerable. Appetite is stable. Having intermittent diarrhea approx every other week with approx 3 episodes when it occurs but resolves with PRN Imodium 1-2 tabs, having normal BMs in between, unsure if diarrhea is r/t eating spinach. Bilateral nephrostomy tubes in place. Denies fever, chills, night sweats, headache, dizziness, chest pain, palpitations, SOB, cough, nausea/vomiting, constipation, abdominal pain, hematuria, LE edema, bleeding, muscle or joint pain/weakness.  10/16/24 - on Xtandi since early Jan 2024 for mCRPC, decreased to 80mg daily as of May 2024 for weight loss. Ongoing fatigue is stable. Ongoing hot flashes are tolerable. Appetite is "alright". Occasional diarrhea is improved, only 2 episodes of diarrhea over the past month. Bilateral percutaneous nephrostomy tubes in place, still passes some urine through his penis. Ongoing intermittent arthritic pains of the shoulders and knees are stable since before cancer diagnosis. Denies fever, chills, night sweats, headache, dizziness, chest pain, palpitations, SOB, cough, nausea/vomiting, constipation, abdominal pain, dysuria, hematuria, incontinence, LE edema, bleeding.   11/14/24: presents for follow up and management of metastatic CRPC on ADT + reduced dose enzalutamide in setting of weight loss and fatigue. His PSA has been noted to be rising over the last several visits from a manjinder of 8.13 to 15.3 at last visit on 10/16/24.   12/2/24: He presents for follow up and cycle 1 of docetaxel 75mg/m2 after his PSA was noted to rise further from 15.3 to 21.4. He has stopped enzalutamide.  He denies any new significant complaints since last visit.   12/23/24: He presents for follow up and cycle 2 of docetaxel 75mg/m2. He notes transient diarrhea and increased fatigue after cycle 1. Also notes alopecia and increased blood pressure when he takes steroid premedication. Denies fever, chills, night sweats, bony pain.  1/13/25:  He presents for follow up and cycle 2 of docetaxel 75mg/m2. He denies any significant change in symptoms. He has intermittent leaking from nephrostomy sites, increased on days of tx.  Notes continued fatigue. Also notes alopecia and increased blood pressure when he takes steroid premedication. Denies fever, chills, night sweats, bony pain.

## 2025-01-14 NOTE — PHYSICAL EXAM
[Fully active, able to carry on all pre-disease performance without restriction] : Status 0 - Fully active, able to carry on all pre-disease performance without restriction [Normal] : affect appropriate [de-identified] : anicteric  [de-identified] : no LE edema

## 2025-01-14 NOTE — ASSESSMENT
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 He started abiraterone + prednisone in June 2020, dose was reduced to 500mg d/t transaminitis. Abiraterone discontinued late December 2023 for disease progression. Xtandi started Jan 2024.  He experienced transient benefit but appears to have progressing disease at this time as manifested by steadily rising PSA since 6/2024 and new lesions noted on CT imaging performed 10/30/24.   We discussed these results and that he will need an alternative therapy to control his disease. Standard of care options include taxane chemotherapy and Pluvicto. Given further increase in PSA to 21.4, we discussed the potential risks and benefits associated with docetaxel. He was amenable and signed informed consent.   He is tolerating treatment well to date. He will proceed with cycle 3 docetaxel 75mg/m2 and return in 3 weeks for C4.     mCRPC: now s/p POD on tawanda/pred ->enzalutamide.  Now on docetaxel.  - Feb 2024 [facility] CDx was negative for any actionable mutations, MSI high not detected, TMB 8 muts/Mb, ctDNA <1%.  - Continue Eligard q6mo inj with urology, given 12/12/24, next scheduled for 6/2025 - Proceed with cycle 3 docetaxel 75mg/m2  Bones: - He is off Ca + D supplementation d/t hypercalcemia.  - Monthly Xgeva inj started 5/15/24, continue  Instructed to contact our office with any new/worsening symptoms. Pt and family member educated regarding plan of care, all questions/concerns addressed to the best of my abilities and their apparent satisfaction.  [AdvancecareDate] : 1/2/24 [FreeTextEntry2] : Health care proxy form provided to pt 12/5/23, he still has at home. Instructed once again to bring the completed form to his next office visit.

## 2025-01-14 NOTE — REVIEW OF SYSTEMS
[Fatigue] : fatigue [Joint Pain] : joint pain [Hot Flashes] : hot flashes [Fever] : no fever [Chills] : no chills [Night Sweats] : no night sweats [Chest Pain] : no chest pain [Palpitations] : no palpitations [Lower Ext Edema] : no lower extremity edema [Shortness Of Breath] : no shortness of breath [Cough] : no cough [Abdominal Pain] : no abdominal pain [Vomiting] : no vomiting [Constipation] : no constipation [Dysuria] : no dysuria [Incontinence] : no incontinence [Joint Stiffness] : no joint stiffness [Muscle Pain] : no muscle pain [Muscle Weakness] : no muscle weakness [Dizziness] : no dizziness [Easy Bleeding] : no tendency for easy bleeding [Easy Bruising] : no tendency for easy bruising [de-identified] : occasional

## 2025-01-14 NOTE — PHYSICAL EXAM
[Fully active, able to carry on all pre-disease performance without restriction] : Status 0 - Fully active, able to carry on all pre-disease performance without restriction [Normal] : affect appropriate [de-identified] : anicteric  [de-identified] : no LE edema

## 2025-02-06 NOTE — PHYSICAL EXAM
[Fully active, able to carry on all pre-disease performance without restriction] : Status 0 - Fully active, able to carry on all pre-disease performance without restriction [Normal] : affect appropriate [de-identified] : anicteric  [de-identified] : no LE edema

## 2025-02-06 NOTE — HISTORY OF PRESENT ILLNESS
[Disease: _____________________] : Disease: [unfilled] [T: ___] : T[unfilled] [N: ___] : N[unfilled] [M: ___] : M[unfilled] [de-identified] : This 66-year-old male was first seen in consultation on July 20, 2016. In August of 2013 his PSA was 4.3. In September 30, 2013 it was 4.9. He underwent a biopsy in March 5, 2014 revealing Ade 7 (3+4) disease. He underwent external beam radiation therapy for a total dose of 8100 cGy in 45 fractions from May 25 and total July 31 of 2014. He subsequently experienced PSA failure and brachytherapy was considered. A CT scan was performed as part of that evaluation revealing a filling defect in the left ureter. In March 2016, he presented to the emergency room with a creatinine of 13.7 and potassium of 8.9. Bilateral nephrostomy tubes were placed. He was found to have GI bleeding and a colonoscopy revealed evidence of radiation proctitis. He has been on Lupron. 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 His initial staging was stage II, [id number].    The original pathology was reviewed prior to radiation. The left base revealed adenocarcinoma the prostate, Ade score 7 (3+4) involving 2 out of 2 cores, 40% of each core, with perineural invasion. The left mid zone revealed adenocarcinoma the prostate, Ade score 7 and (4+3) involving 50% of each of 2 cores, with perineural invasion noted. The left apex had 3 of 3 cores involved. One core was Ade 7 (3+4) involving 100% of the core. The other 2 cores had a Ade 6 (3+3) in less than 5% of 2 cores. Perineural invasion was identified as well.  Posttreatment biopsy revealed the presence of adenocarcinoma within the prostate gland. The PSA was 11 at the time of the biopsy.  Feels well at initial consultation. Started Lupron in January 2016 before the renal failure/obstruction. No pains. Fatigue at times. Appetite good, stable weight. Lost weight with illness in March, typical weight prior was 210, left hospital at 170. Weight stable. He was transfused multiple times. Some urine through penis recently. Has bilateral percutaneous nephrostomies. No blood, dysuria, no incontinence. Nocturia x 2-3 at this time. Right sided nephrostomy still has output, small amount in left bag. Due for change of tubes in September. Had radiation proctitis and has laser treatment. Sees GI in follow-up next month.   10/18/16...Had a colonoscopy, showed radiation colitis. No further rectal blood noted. Urine flow mostly from PCNs, some from penis. No dysuria, some  hematuria noted at times. Appetite is normal. No weight loss. No fatigue. Hot flushes occur daily. .  4/18/17...last scans 9/26.  Saw PCP and had PSA done, shows slight increase over manjinder. he was given ferrous sulfate to take for anemia. No pains. Urine flow is mostly thru PCNs. Still has occasional blood in the urine, noted in the bag. Has less volume form the left side.  Hot flushes occur a few a day, 15-20 minutes, occ sweats. Appetite is good, no weight loss. Some fatigue noted. No edema. Occ blood with stools, saw Dr Grossman in September, due for follow up. has not had major bleeding recently.   8/1/17...Surya states that he is feeling good, his urine flow is strong, slight increase, no pains anywhere, he gets hot flashes a "few  day, they are coming." He has some minor fatigue, He has percutaneous nephrostomies, He follows with Dr. Dick, no obvious hematuria, no breast tenderness.  11/14/17...Received Lupron from Dr Dick a few weeks ago. PSA was 0.02 on 8/1/17. He gets hot flashes a couple times a day, He has b/l nephrostomies, is able to pass urine through his penis. He denies hematuria, dysuria He states appetite is good. He gets some fatigue, He denies any pains.  4/17/18...Received Lupron from Dr Dick Jan 23, 2018. Not seen since November 17, due to illness. 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 Feels well. No pains noted. Some fatigue. Appetite is good. Reports that he has diarrheal for 2-3 months, not daily. He had loose stools this AM, took Imodium. Diarrhea occurs 1-2 days a week, the n stops. Some flatus, no cramps. No blood in the stool. Urine flow is via nephrostomy, minor amount thru penis, no dysuria, no incontinence. No blood in urine. Hot flushes most days.    Minor fatigue. Appetite normal.   7/17/18...On CAB. Feels "okay". No pains. Has bilateral nephrostomy tubes, due for change in August. No recent infections, occasionally passes urine via penis, no blood, no dysuria. Still has hot flushes, daily, more in the winter. Some fatigue, no physical impairment. Appetite is good. Weight stable. Diarrhea persists, on and off, not daily. Watery at times. takes Imodium on occasion. Occ small amount of blood with hard stools, secondary to radiation proctitis.   10/23/18...Feels "okay". No pains. Urine flow is minimal, most comes out of the nephrostomy tubes, nocturia -none. No blood, no dysuria. No incontinence. Hot flushes occur, day and night. Appetite is good, no weight. No dyspepsia, no nausea, no vomiting. Diarrheal stools occur episodically, every 2-3 weeks. No blood in the stool. Usually 1-2 BMs per day. Occasionally notes blood on toiler paper with hard stool. has RT proctitis.   2/5/19...Feels well, no pains. has hot flushes frequently, daily. Has some sweats with them at night. Appetite is good, no weight loss. Urine flow is minimal thru penis, nocturia does not occur. Most urine form PCNs, last catheter change January 2019, by IR at [facility]. Occ minor blood in the urine, when the catheter is loose. No back pains, no bone pains. Occ fatigue.   5/7/19...Had a calcium of 11.1 last visit. Called him and told to stop calcium and vit D. Repeat 3 weeks later was normal. Continues on Lupron and Casodex. Feels well. Hot flushes every day. He has some fatigue, unchanged. Appetite is good, no weight loss. Urine is minimal from penis, has bilateral PCNs. No bone pains. No edema.   8/8/19...Feels "all right", as a URTI. No pains noted. Hot flushes multiple time a day. Fatigue is present, mild. Urine flow is 'good', PCN bilateral, most flow form the right. Some flow thru penis, voids 1-2 times a day. NO blood, no dysuria. Due for PCN change next week. Appetite is good, no weight change.  10/29/19...Local failure prostate cancer, on CAB. Feels well. No pains., Urine flow is good. Has bilateral PCNs. Most of urine goes to PCNs. Occ small amount of blood in the bag. Voids 2 times a day. Hot flushes, daily, with sweats on occasion. has fatigue at times. Appetite is good, no weight loss. NO leg edema. No bleeding from the RT proctitis recently  1/28/20...Stopped bicalutamide after last visit. Last seen 3 months ago. PSA on 1/7 was 0.27, thus continued to rise somewhat. NO bone pains. Hot flushes daily, occ sweats. Appetite is good, no weight loss. No fatigue of note. Urine flow is good, but most flow is into the PCNs. Last change of PCNs was December after he had some leaking on one side. No hematuria, no dysuria. No incontinence.   5/6/20...Verbal permission granted for telephone services by patient, Surya Cervantes, on 5/6/20 at 1:35 PM.   Fells well. No pains noted. Nephrostomy tubes in place, replaced last week. Appetite is good, no weight loss. Hot flushes daily. He has chronic fatigue complaints. No leg edema noted. Urine through penis is minimal, only when tubes become clogged. No blood in the urine. No nausea, no vomiting. Has diarrhea at times, once every 2-3 weeks. No back pains noted. No fevers, no chills., No cough, no sputum.   6/17/20...Verbal permission for telephone contact was granted by the patient, Surya Cervantes, on June 17, at 4 PM. Feels "all right". NO pains. Hot flushes, daily, occ minor sweats. Fatigue is present, rated mild, occasional naps. Appetite is good, no weight loss. No edema. Urine flow is minimal from penis, has bilateral PCNs. No blood. No incontinence. No recent infections. No cough NO CHEN. No F/C. Helps in care of his mother, watches TV, not very active. Jeana from [employer].   7/29/20...Verbal permission for telephone contact was granted by the patient, Surya Cervantes, on July 29, 2020 at 3:10 PM.  Started tawanda/pred on June 16, 2020. Feels  "pretty good". He was having diarrhea prior and he no longer had diarrhea.   No pains at this time. No edema. No headaches. Checks BP on occasion.  BPs have been "normal", he will check often. Urine flow "about the same",. Most urine comes from PCNs. No nocturia, no  hematuria, no dysuria. Appetite is normal, thinks he may have lost a few pounds. No cough, No CHEN. NO F/C. Hot flushes occur daily. No change. Mild fatigue.  8/26/20...On abiraterone and prednisone since June, PSA dropped significantly. taking meds correctly. Feels 'all right", no pains. Fatigue is present, as before, "not terrible", takes naps. Slightly more fatigue than before. Appetite is good, but lost weight. eats with his mother, she eats less and so does he. No nausea, no vomiting. No headaches, no edema of note. Most of urine comes from PCNs, Urine from penis is minimal, does not get up at night. No blood, no dysuria. No incontinence. No blood in the PCN bags.  Has leg cramps daily, particularly in the AM. Dr Angulo administers Lupron, due for Rx on 9/1/20.   9/22/20...He is seen in the office today in follow-up. He's been on abiraterone and prednisone since June. He had elevated transaminases on September 3 and his abiraterone was subsequently held. He will have repeat blood work done today after today's visit. He states that he feels "all right." His appetite is stated to be "all right." His weight has been stable. He denies dyspepsia, nausea or vomiting. He denies constipation or diarrheal stools. There is no cough or shortness of breath. He denies skin rashes or pruritus. There are no complaints of pains at this time. Fatigue is present but it does not impair his daily activities. He takes occasional naps during the day. He denies arthralgias or myalgias. There are no headaches or dizziness. He has bilateral percutaneous nephrostomy tubes draining yellow urine. There is no blood in the percutaneous nephrostomy bags. Most of the urine comes from the percutaneous nephrostomy tubes with minimal amount of urine coming out through the penis. There is no nocturia. There is no hematuria or dysuria. There is no incontinence. There is no edema in the extremities. He has occasional hot flashes but felt it has improved these past few days. He denies fevers or chills. He infrequently checks his blood pressure at home. He would get blood pressure readings ranging from 150s-160s systolic over 80s-90s diastolic. He remains independent in his activities of daily living.   10/27/20...Verbal permission for telephone services granted by the patient,  Surya Cervantes on October 27, 2020 at 3:48 PM.  Stared abiraterone in June. Feels "pretty good". NO increased edema. No headaches. Hot flushes are less. Appetite is good, no weight loss. Has PCNs, has urine form the penis at times. NO blood in the urine. No dysuria. No nocturia. No incontinence. No bone pains noted. Fatigue  RTs, takes a nap at times. Cares for his elderly mother at home.   11/25/20...Verbal permission for telephone services granted by the patient,  Surya Cervantes on November 25, 2020 at 1:50 PM On 1/2 dose tawanda due to transaminases. No pains, feels "all right". Some fatigue. Urine flow form penis in minimal, has bilateral PCNs. Occasional small amount of blood in the right PCN tube. Appetite is good, no weight loss. No cough, no CHEN. Hot flushes daily. No headaches, no edema. BP monitored at home, 151/89, pulse 95.   12/23/20...Verbal permission for telephone services granted by the patient,  Surya Cervantes on December 23, 2020 at  1:32 PM.  Feels well. No pains noted. Urine flow RTS. Has PCNs, minimal urine via penis on occasion. No blood in urine or tubes of note. Appetite is good, no weight loss. NO edema of the legs. NO headaches, no dizziness. No cough, no CHEN. No F/C. Hot flushes occur daily, less often. Due Lupron next month from Dr Angulo. Mild fatigue, take a nap. Independent in ADLs, cares for his mother. BP at home 138/90, pulse 71. Left arm 147/92  1/28/21...Verbal permission for telephone services granted by the patient,  Surya Cervantes on January 28, 2021 at 315 PM.  Surya reports that he has no pains.  There is no significant urine within his blood.  In the interim, he had to have the left percutaneous nephrostomy tube changed as there was pus present.  They had some difficulties apparently.  The tube was clogged and he was unable to change it over a guidewire.  He was not treated with any antibiotics.  He does have some urine from the penis at times.  There is no fevers or chills.  His appetite is good and there is no weight loss.  He reports that his weight is 190 pounds today.  He says his blood pressure was 146/92 in the left arm and 145/85 on the right arm.  The pulse rate was 75.  He reports no edema or headaches.  There are some hot flushes.  2/25/21...Verbal permission for telephone services granted by the patient,  Surya Cervantes on 2/25/21 at 3:10 PM Feels "all right". NO pains. He was having issues with PCNs clogged, had to have removal and reinsertion. It was the right side this week, done on Tuesday. Told to flush it daily rather than every 3 days. Appetite is good, no weight loss, no edema. No cough, no CHEN. BP is monitored, right side 139/84 today, left 134/82, pulse 79. No headaches, no significant fatigue, does nap on occasion. NO leg weakness. No N/V/D/C. Minimal urine from penis. There was more flow when the dysfunction occurred. NO blood in the urine, no dysuria.   4/7/21...  10/26/21....Last evaluation was 4/7/21, by telephone. Completed antibiotics. Was in rehab for 6 weeks. Strength is good, no pains. Appetite is good, gained some weight. urine flow is good, no incontinence, very little urine from penis, mostly from the tubes. No headaches, no dizziness, no balance issues, No falls. Hot flushes occur. Lives with mother, helps her. No edema at this time.   From neurosurgery, this summary of events....70 yo Male w/ PMHx of HTN, prostate CA (on chemo, s/p radiation), b/l nephrostomy tubes, presented  to [facility] ED on 6/14/21 with weakness and confusion. In ED foulurine in b/l nephrostomy bags noted, tachycardic, and hypotensive. s/p IVF and levo drip for septic shock, developed fluid overload with pitting edema and decreased  EF. Imaging of the spine concerning for discitis and osteomyelitis c-spine with T1 inflammation/signal changes.   Hospital coursed remarkable for BC + Klebsiella and strep anguinous. Started on vanco/zosyn 6/14. As per ID switched to ceftriaxone 2 g on 6/17. Hospitalization c/b ASHLEY on CKD with right hydronephrosis. IR consulted- no intervention, both nephrostomy tubes flushing adequately. Nephrology  consulted, started on stress dose steroids/hydrocortisone 50 mg q6 and Midodrine. CTAP showed Sludge in gallbladder.  Patient  underwent C6-7 corpectomy and posterior fusion C4-T2 on 6/29/21. Hospital course c/b tachycardia, fever with low O2 sats. Chest CT negative for  PE, lower extremity Dopplers negative. s/p PICC, discharged to inpatient rehab on 7/12/21 and then was discharged home after completion of rehab.   Last Eligard was August with Dr Angulo, 30 mg dose.   11/30/21...on tawanda/pred since June 2020. Feels  "all right". Has hot flushes and fatigue. Does not prevent activities. He falls asleep easily if he sits in a chair. Independent in ADLs, cares for his mother. No infections recently, No F/C. No recent adventures with with his PCNs. No blood in urine, occ urine via penis. Appetite is good, gained 2.2 kilos. Edema decreased. No chest pain/pressure. No cough/ CHEN. No headaches, no dizziness. No N/V/D/C.   1/11/22....telehealth today. Feels  "all right". No pains of note. t flushes daily. Has some fatigue as well. No edema noted. NO cough/CHEN No chest pain/pressure. No F/C. Appetite is good, no weight loss. Weighs 176. Checks BP at home, 142/98 RP 72 today, L arm, 142/91. Takes terazosin at night. Sees PCP in March. Needs to call PCP for tighter BP control. NO headaches, no dizziness. No F/C. No hematuria, has bilateral PCNS, has some small amount of urine from the penis. Had change of PCNs last week. Independent in ADLs.   2/8/22 - telehealth visit today. Continues on abiraterone 500mg daily + prednisone. Reports his BP today was 143/94 with HR of 74. Overall feeling "alright". Moderate fatigue, takes naps during the day. Occasional hot flashes. Appetite is good, weight today is 180lbs, he attributes recent weight gain to eating better after being discharged from the hospital. Mild ptosis of left eyelid comes and goes and has reportedly been stable for his "whole life". Bilat percutaneous nephrostomy tubes in place, no hematuria. Ongoing mild BLE edema is reportedly stable. Ongoing intermittent arthritis pains of left knee and right shoulder are stable. Denies fever, chills, headache, dizziness, balance issues, eye pain/problems, mucositis/odynophagia, chest pain, palpitations, SOB, cough, nausea/vomiting, diarrhea/constipation, abdominal pain, hematuria, rash/pruritus, neuropathy, bleeding, weakness, anxiety/depression.  3/17/22...tawanda/pred started mid June 2020. PSA was 11.6 at start. Feels "all right". No pains. No major issues with the PCNs, having a change done next week. Appetite is good, no weight loss. No cough, No CHEN. Some leg edema. No chest pain/pressure. Urine from penis is relatively little. No dysuria, no blood. has calcium oxalate excretion, which causes his tubes to be clogged. Notes some bruising. NO headaches, no dizziness, no balance issues. No F/C. No N/V/C, some minor diarrheal stools at times. Takes Imodium as as needed. Fatigue is present at times, has hot flushes occur multiple times a day.   4/20/22 - continues on abiraterone (500mg daily) + prednisone since June 2020 for mCRPC. Overall feeling "alright", ongoing mild fatigue is stable. Intermittent hot flashes are tolerable. Mild night sweats during the summer when it gets warmer. Ongoing mildly productive cough which he's had for "a long time". Occasional diarrhea that doesn't last more than a day. Bilateral percutaneous nephrostomy tubes in place, passing minimal urine from his penis. Occasional hematuria especially from right nephrostomy bag, clear today. Ongoing mild BLE edema. Ongoing chronic left knee swelling and pain 2/2 arthritis. Ongoing right shoulder pain 2/2 arthritis as well. Denies fever, chills, headache, dizziness, balance issues, eye pain/problems, mucositis/odynophagia, chest pain, palpitations, SOB, nausea/vomiting, constipation, abdominal pain, rash/pruritus, bleeding, muscle pain/weakness  5/24/22 - continues on abiraterone (500mg daily) + prednisone since June 2020 for mCRPC. BP today is 164/95, asymptomatic. BP at home has been 150s/80-90s. On occasion the DBP has been >100. Overall feeling "alright", ongoing fatigue is stable. Ongoing intermittent hot flashes. Appetite is good. Vision is changing in his left eye, he has an appt with [facility] this Thurs. Bilateral nephrostomy tubes in place, occasional hematuria at times resolves after 2-3 days. Ongoing trace edema of BLEs. Intermittent pruritus around nephrostomy tube dressings. Denies fever, chills, night sweats, headache, dizziness, balance issues, mucositis/odynophagia, chest pain, palpitations, SOB, cough, nausea/vomiting, diarrhea/constipation, abdominal pain, dysuria, incontinence,  rash, neuropathy, bleeding, muscle or joint pain/weakness.   6/21/22 - continues on abiraterone, half dose + prednisone since June 2020 for mCRPC. PSA 11.6 at start of treatment in June 2020, manjinder PSA at o.o5 August 2021, slow rise, recent PSA in May 2022 was 0.17.  feels  "all right". No pains noted. Has bilateral PCNs, changed last week. No infections, no F/C. Some fatigue, always, no worse. Appetite is good, no weight loss. NO headaches, no dizziness, no balance issues. Some edema noted, as before. No chest pain/pressure. NO bone pains. No N/V/D/C. Small amounts of urine via the penis. Hot flushes daily, multiple times. No cough, no CHEN.   7/28/22 - continues on abiraterone 500mg + prednisone since June 2020 for mCRPC. Overall feeling "alright", ongoing mild fatigue for which he occasionally takes a nap once a day. Ongoing hot flashes are tolerable. Appetite is good. Occasional cough. Bilateral percutaneous nephrostomy tubes in place passing most of the urine through the bags but still passing minimal urine via penis, no nocturia. Trace edema of BLEs. Notes intermittent cramping of hands which may be arthritis as it improves with movement. Has ongoing intermittent right buttock pain radiating down to the knee, present for the past year, pain is worse with sitting for a long time, improves with changing positions, max pain is 3/10 not requiring medication. Denies fever, chills, night sweats, headache, dizziness, balance issues, eye pain/problems, mucositis/odynophagia, chest pain, palpitations, SOB, nausea/vomiting, diarrhea/constipation, abdominal pain, dysuria, hematuria, incontinence, rash/pruritus, bleeding, weakness.   10/11/22 - continues on abiraterone 500mg daily + prednisone since June 2020 for mCRPC. Neurosurgical notes reviewed...."S/P cervical spinal fusion, 70 year old male 15 months pos op C6-7 corpectomy and posterior fusion C4-T2 on 6/29/21 with Dr. Tiffanie Martinez". Now seeing Dr Resendiz. Overall, feels "all right". No pains noted. urine flow is "fine", no incontinence, most urine still out PCNs, some from the penis. No blood, no dysuria. Hot flushes daily.  Some fatigue, RTS. Appetite is good, no weight loss. No headaches, dizziness, no balance issues, No paresthesias. Occ cough, sputum is white. No CHEN. Tubes to be changed next week. No N/V/D/C.  Some edema , improved. No chest pain/pressure  11/9/22 - continues on abiraterone 500mg daily + prednisone since June 2020 for mCRPC which he is tolerating well. Pt reports feeling generally well. No new complaints. Bilateral nephrostomy tubes in place, denies hematuria . Ongoing trace edema of BLEs. Intermittent pruritus around nephrostomy tube dressings. Denies fever, chills, night sweats, headache, dizziness, balance issues, mucositis/odynophagia, chest pain, palpitations, SOB, cough, nausea/vomiting, diarrhea/constipation, abdominal pain, dysuria, incontinence, rash, neuropathy, bleeding, muscle or joint pain/weakness. Appetite reported as good. No changes in medications  12/7/22 - continues on abiraterone 500mg daily + prednisone since June 2020 for mCRPC.  gets his Eligard 45 tomorrow form Dr Angulo. feels "all right". No pains noted except for arthrtic complaints. Appetite is okay< dropped a few pounds. usual edema, no change. No headaches, NO dizziness. Most of the urine comes from PCNs, changed every 6 weeks due to prior infections. NO fevers, no chills. Hot flushes occur, 1-2 times a day. Notes a bit more urine thru penis lately. Occ cough, no CHEN. No chest pain/pressure/palpitations. NO N/V/D/C. Mild fatigue. Independent in ADLs.   1/4/23 - continues on abiraterone 500mg daily + prednisone since June 2020 for mCRPC. Overall feeling "alright", ongoing fatigue is stable. Ongoing hot flashes are tolerable. Appetite is "ok". Ongoing occasional productive cough is stable. Bilateral perc nephrostomy tubes in place, continues to pass some urine through the penis, no nocturia. Denies fever, chills, night sweats,headache, dizziness, balance issues, eye pain/problems, mucositis/odynophagia, chest pain, palpitations, SOB, nausea/vomiting, diarrhea/constipation, abdominal pain, dysuria, hematuria, incontinence, LE edema, rash/pruritus, bleeding, muscle or joint pain/weakness.   2/13/23..... continues on abiraterone 500mg daily + prednisone since June 2020 for mCRPC. treated with cephalexin and developed rash, red skin, pruritus. Chart labeled as allergy. Hospitalization was brief. sudden onset of symptoms, Hd diarrheal stools as well. Feels  "pretty good", except for residual pruritus. No pains noted.  Has tube change set for next week. Appetite has retuned. No N/V. Diarrhea resolved, NO taste alteration. No headaches, no dizziness. had some edema. resolved. No chest pain/pressure/palpations.  has his usual fatigue and hot flushes. Cough, chronic, with clear sputum. No CHEN/SOB. No fevers of chills since discharge  Hospital Course:  Discharge Date	01-Feb-2023  Admission Date	29-Jan-2023 17:52  Reason for Admission	Syncope  Hospital Course	  71 yo M w/ HTN, gout, prostate cancer s/p b/l nephrostomy tubes, on abiraterone, admitted w/ sepsis 2/2 UTI, improved on antibiotics.  Sepsis secondary to UTI.  was febrile, tachycardic in the ED, met criteria for sepsis on admission  - U/A w/ +nitrite, large LE, prior cultures with klebsiella and E. coli  - current Ucx >3 organisms suggestive of collection contamination  - received IV zosyn (1/29/23-2/1/23),  Bcx negative, discharge on oral cephalexin to complete 10 days for complicated UTI (end date 2/7/23)  - sepsis resolved (currently afebrile, without leukocytosis, tachycardia resolved)  - IR consulted nephrostomy tubes in position and draining well, no indication for exchange at this time.   Gastroenteritis.   had reported nausea/vomiting/diarrhea after eating fried rice w/ beef  - s/p IV fluids, now resolved.   Syncope.   reports syncopal episode likely 2/2 dehydration from gastroenteritis  - s/p IV fluids, EKG sinus rhythm, troponin indeterminate but no increased  - reports symptoms resolved, ambulating around unit independently (advised call don't fall).   3/21/23..... continues on abiraterone 500mg daily + prednisone since June 2020 for mCRPC. treated with cephalexin and developed rash, red skin, pruritus. Chart labeled as allergy. Overall, he feels "all right". No pains of note. Appetite is good, no weight loss. No edema. No chest pain/pressure/palpitations. some cough, no CHEN. Hot flushes daily. Fatigue is present.  Does yoga, runs in place. No HA, no dizziness, no falls. Independent in ADLs. no fevers, no chills. Minimal urine from penis, no dysuria. PCNs are changed every 6 weeks, no blood in tubes. No N/V/D/C.   4/17/23.....  on abiraterone 500mg and prednisone since June 2020 for mCRPC. Prior transaminitis led to decrease in dose. "doing okay". Minor discomfort on left side near the PCN. Has change every 6 weeks at this time. Appetite is good, no weight loss. No N/V/D/C. Has some urine form the penis, noted if PCNs pinch. No blood in the urine, no hematuria. Hot flushes as before, about 3 times a day.  fatigue RTS. No cough, no CHEN, No fevers, no chills. No edema, no chest pains,pressure/palpitations  Exercises every AM.   5/16/23....continues on abiraterone 500mg and prednisone since June 2020 for mCRPC. Prior transaminitis led to decrease in dose. Eligard next month with Dr Angulo. Overall, feels "all right". No pains noted. Appetite is good, no weight loss. No N/V/D/C. Urine from penis on occasion, empties bladder before bed. No hematuria, no dysuria. No fevers, no chills. PCNs were changed last week. No headaches, no dizziness, no balance issues.  Exercises daily, walking and running. Occ cough, no CHEN. No chest pain/pressure/palpitations. Hot flushes remains. No fevers, no chills  6/12/23.... on abiraterone 500mg and prednisone since June 2020 for mCRPC. Prior transaminitis led to decrease in dose. Mara with Dr Angulo. Feels "well". NO pains, except for radicular pain in right thigh, present for about 6 weeks, constant. uses a topical, icy-hot. Does not awaken him, better when he is lying down, aggravated by ambulation. Appetite is normal. No weight loss. NO fevers, no chills. NO fatigue. No headaches, no dizziness, no paresthesias. No cane. No falls. No cough, no CHEN. NO N/V/D/C. Urine from PCNs. Some from the penis, no hematuria, no dysuria. No nocturia. Hot flushes daily. No edema. NO chest pain/pressure/palpitations. No back pains. Independent in ADLs  7/18/23 - continues on abiraterone + prednisone (500mg daily d/t transaminitis) for mCRPC since June 2020. Ongoing fatigue after periods activity, naps once a day. Ongoing hot flashes are tolerable. Appetite is good. Occasional cough. Bilateral perc nephrostomy tubes in place, scant blood if the tubing moves around but not persistent. Ongoing pain in right buttock radiating down posterior thigh/leg, no pain with lying down, pain is worse with sitting for a prolonged time and improves with walking, max pain is 8/10. Not taking any pain medication as the pain eases up when he gets up and walks around.   8/16/23 - continues on abiraterone + prednisone (500mg daily d/t transaminitis) for mCRPC since June 2020. Overall feeling "alright", ongoing fatigue is stable. Ongoing frequent hot flashes, tolerable. Appetite is "alright". Ongoing occasional cough. Perc nephrostomy tubes exchanged 2wks ago, occasional transient hematuria which he attributes to accidentally pulling on the tubes. Ongoing stable intermittent right buttock pain radiating down posterior thigh down to the calf, improves with movement and worse with sitting for a long time, max pain is 5/10. Using a topical cream called "[place] Dream" which has been helpful.   9/20/23 - continues on abiraterone + prednisone (500mg daily d/t transaminitis) for mCRPC since June 2020. last PCN change was last week. On a 6 week change protocol. Feels "not bad", chronic leg pains, form right buttock, down the leg. Passing urine via penis, very small amounts. No blood, no dysuria. No nocturia. No incontinence, voids about 2 times a day. Hot flushes, every day, no sweats. Fatigue is present, naps, feels refreshed afterwards. Does yoga, stretching, walking, lifts some weights, daily basis. Appetite is good, no weight Kenji Occ cough, no CHEN. No edema, no chest pain/pressure.   10/31/23 - continues on abiraterone + prednisone (500mg daily d/t transaminitis) for mCRPC since June 2020. Overall feeling "alright", ongoing fatigue is stable. Occasional hot flashes. Appetite is "alright". Perc nephrostomy tubes in place, passing minimal urine through his penis. Ongoing pain in right buttock that radiates down the posterior leg, worse in the morning when first waking up and worse with sitting for a prolonged period, improves/resolves with activity. Denies fever, chills, night sweats, headache, dizziness, balance issues, chest pain, palpitations, SOB, cough, nausea/vomiting, diarrhea/constipation, abdominal pain, dysuria, hematuria, incontinence, LE edema, bleeding.   12/5/23 - continues on abiraterone + prednisone (500mg daily d/t transaminitis) for mCRPC since June 2020. Overall feeling well, some fatigue but remains active and exercising regularly. Ongoing hot flashes are tolerable. Appetite is good. Bilateral percutaneous nephrostomy tubes in place, no hematuria. Ongoing right buttock pain that radiates down the posterior leg, worse in the morning and improves with activity, max pain is 4-5/10, declines referral to PM&R. Denies fever, chills, night sweats, headache, dizziness, balance issues, chest pain, palpitations, SOB, cough, nausea/vomiting, diarrhea/constipation, abdominal pain, LE edema, bleeding.  1/2/24 - on abiraterone + prednisone since June 2020, discontinued 12/26/23 for POD seen on 12/21/23 bone scan. Feeling well, no significant fatigue. Remains active and doing exercises daily. Occasional hot flashes are tolerable. Bilateral percutaneous nephrostomy tubes in place, no hematuria. Ongoing right buttock pain that radiates down the posterior leg, worse in the morning and improves with activity, max pain is 7-8/10, pain is stable. Denies fever, chills, night sweats, headache, dizziness, balance issues, chest pain, palpitations, SOB, cough, nausea/vomiting, diarrhea/constipation, abdominal pain, LE edema, bleeding.   2/14/24 - Xtandi started early Jan 2024 for mCRPC. Over the past week he's been very fatigued. Ongoing hot flashes. Appetite is decreased and not eating as much as he used to, weight is down 11lbs over the past 6wks. Stable cough since before start of Xtandi. Percutaneous nephrostomy tubes in place, no hematuria. Notes some pains in the hands, knees, and ankles which he attributes to arthritis. Ongoing pain in right buttock radiating down posterior leg, pain is worse in the AM when first waking up and improves with activity/walking, pain waxes and wanes, max pain is 7/10 but not taking any medication for this. Denies fever, chills, night sweats, headache, dizziness, balance issues, chest pain, palpitations, SOB, nausea/vomiting, diarrhea/constipation, abdominal pain, LE edema, bleeding.   3/14/24 - continues on Xtandi since early Jan 2024 for mCRPC. Overall feeling ok, fatigue is a little improved which he believes is because his BP hasn't been as low as it was in the past. BP at home has been 120's/70-80's in the mornings. Ongoing hot flashes are not as intense. Appetite is decreased, weight is down 7lbs over the past month, but overall down 18lbs over 2 months. Urine from bilateral percutaneous nephrostomy tubes is "good", no hematuria. Ongoing pain in right buttock down the posterior leg, pain is worse with sitting for a prolonged period, improves with getting up and moving around, not interfering with his sleep, not needing any medication for pain. Denies fever, chills, night sweats, headache, dizziness, balance issues, chest pain, palpitations, SOB, cough, nausea/vomiting, diarrhea/constipation, abdominal pain, LE edema, bleeding.  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 Excess lambda light chains on last blood work, no M-spike. Feels well. no Pains. Has to some fatigue, naps during the day. Appetite is "good", No N/V/D/C. No cough, no CHEN. NO chest pain/pressure. No headaches, no dizziness. No balance issues, minimal, walks with a cane. No falls. Urine  flos via tubes, no recent infections. NO blood in urine. Some minor urine amounts from penis. No nocturia, no dysuria. Hot flushes occur, less than before, 1-2 times a day. No edema noted. Independent in ADLs.  CDX done, no targets  5/15/24 - continues on Xtandi since early Jan 2024 for mCRPC. Feeling "alright". Ongoing fatigue, naps 1-2x/day. Occasional hot flashes. Decreased appetite, not eating as much as before, drinking 1-2 Ensure per day, weight is down 5lbs over the past month. Bilateral percutaneous nephrostomy tubes in place, no hematuria. Ongoing arthritic pains of the shoulders and hands at times. Denies fever, chills, night sweats, headache, dizziness, balance issues, chest pain, palpitations, SOB, cough, nausea/vomiting, diarrhea/constipation, abdominal pain, LE edema, bleeding.   6/12/24 - continues on Xtandi since early Jan 2024 for mCRPC, decreased to 80mg daily as of May 2024 for weight loss. Ongoing fatigue is stable. Ongoing hot flashes. Appetite is "a little better". Urine from nephrostomy tubes is clear yellow, no hematuria. Denies fever, chills, night sweats, headache, dizziness, balance issues, chest pain, palpitations, SOB, cough, nausea/vomiting, diarrhea/constipation, abdominal pain, LE edema, bleeding, muscle or joint pain/weakness.  7/11/24 - on Xtandi since early Jan 2024 for mCRPC, decreased to 80mg daily as of May 2024 for weight loss. Patient accompanied by brother, reports to be feeling well, appetite slowly improving, continues to have nephrostomy tubes in places, last changed 23 weeks ago draining clear yellow urine. Denies fever, chills, headaches, chest pain, sob, abdominal pain/back pain.   8/14/24 - on Xtandi since early Jan 2024 for mCRPC, decreased to 80mg daily as of May 2024 for weight loss. Overall feeling well, ongoing fatigue is stable. Ongoing hot flashes.  Appetite is stable, weight is up a couple lbs since last visit. Diarrhea yesterday "all day" with >6-7 episodes of soft/loose stool, he took PRN Imodium yesterday, no more stool today, unsure if it was r/t something he ate. Urine from nephrostomy tubes is "good", no hematuria. Denies fever, chills, night sweats, headache, dizziness, chest pain, palpitations, SOB, cough, nausea/vomiting, constipation, abdominal pain, LE edema, bleeding, muscle or joint pain/weakness.  9/17/24 - on Xtandi since early Jan 2024 for mCRPC, decreased to 80mg daily as of May 2024 for weight loss. Feeling "alright", stable fatigue. Ongoing hot flashes are tolerable. Appetite is stable. Having intermittent diarrhea approx every other week with approx 3 episodes when it occurs but resolves with PRN Imodium 1-2 tabs, having normal BMs in between, unsure if diarrhea is r/t eating spinach. Bilateral nephrostomy tubes in place. Denies fever, chills, night sweats, headache, dizziness, chest pain, palpitations, SOB, cough, nausea/vomiting, constipation, abdominal pain, hematuria, LE edema, bleeding, muscle or joint pain/weakness.  10/16/24 - on Xtandi since early Jan 2024 for mCRPC, decreased to 80mg daily as of May 2024 for weight loss. Ongoing fatigue is stable. Ongoing hot flashes are tolerable. Appetite is "alright". Occasional diarrhea is improved, only 2 episodes of diarrhea over the past month. Bilateral percutaneous nephrostomy tubes in place, still passes some urine through his penis. Ongoing intermittent arthritic pains of the shoulders and knees are stable since before cancer diagnosis. Denies fever, chills, night sweats, headache, dizziness, chest pain, palpitations, SOB, cough, nausea/vomiting, constipation, abdominal pain, dysuria, hematuria, incontinence, LE edema, bleeding.   11/14/24: presents for follow up and management of metastatic CRPC on ADT + reduced dose enzalutamide in setting of weight loss and fatigue. His PSA has been noted to be rising over the last several visits from a manjinder of 8.13 to 15.3 at last visit on 10/16/24.   12/2/24: He presents for follow up and cycle 1 of docetaxel 75mg/m2 after his PSA was noted to rise further from 15.3 to 21.4. He has stopped enzalutamide.  He denies any new significant complaints since last visit.   12/23/24: He presents for follow up and cycle 2 of docetaxel 75mg/m2. He notes transient diarrhea and increased fatigue after cycle 1. Also notes alopecia and increased blood pressure when he takes steroid premedication. Denies fever, chills, night sweats, bony pain.  1/13/25:  He presents for follow up and cycle 3 of docetaxel 75mg/m2. He denies any significant change in symptoms. He has intermittent leaking from nephrostomy sites, increased on days of tx.  Notes continued fatigue. Also notes alopecia and increased blood pressure when he takes steroid premedication. Denies fever, chills, night sweats, bony pain.  2/3/25:  He presents for follow up and cycle 4 of docetaxel. He denies any significant change in symptoms.  Notes continued fatigue. Also notes alopecia and increased blood pressure when he takes steroid premedication. Denies fever, chills, night sweats, bony pain.   [FreeTextEntry1] : Lupron, Casodex, stopped Casodex, which was October 29, 2019. NO AAW benefit. Started tawanda/pred mid June 2020, discontinued 12/26/23 for disease progression. Xtandi started early Jan 2024, decreased to 80mg daily in May 2024 d/t weight loss. Switched to docetazel in 12/2024 due to PSA progression.

## 2025-02-06 NOTE — ASSESSMENT
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 He started abiraterone + prednisone in June 2020, dose was reduced to 500mg d/t transaminitis. Abiraterone discontinued late December 2023 for disease progression. Xtandi started Jan 2024.  He experienced transient benefit but appears to have progressing disease at this time as manifested by steadily rising PSA since 6/2024 and new lesions noted on CT imaging performed 10/30/24.   We discussed these results and that he will need an alternative therapy to control his disease. Standard of care options include taxane chemotherapy and Pluvicto. Given further increase in PSA to 21.4, we discussed the potential risks and benefits associated with docetaxel. He was amenable and signed informed consent.   He is tolerating treatment well to date. He is noted to have worsening anemia. To reduce likelihood of significant anemia, we will plan to reduce dose of docetaxel to 60mg/m2. He will return in 3 weeks.   mCRPC: now s/p POD on tawanda/pred ->enzalutamide.  Now on docetaxel.  - Feb 2024 [facility] CDx was negative for any actionable mutations, MSI high not detected, TMB 8 muts/Mb, ctDNA <1%.  - Continue Eligard q6mo inj with urology, given 12/12/24, next scheduled for 6/2025 - Proceed with cycle 4 docetaxel, dose reduced to 60mg/m2  Bones: - He is off Ca + D supplementation d/t hypercalcemia.  - Monthly Xgeva inj started 5/15/24, continue  Instructed to contact our office with any new/worsening symptoms. Pt and family member educated regarding plan of care, all questions/concerns addressed to the best of my abilities and their apparent satisfaction.  [Palliative Care Plan] : not applicable at this time [With Patient/Caregiver] : With Patient/Caregiver [AdvancecareDate] : 1/2/24 [FreeTextEntry2] : Health care proxy form provided to pt 12/5/23, he still has at home. Instructed once again to bring the completed form to his next office visit.

## 2025-02-06 NOTE — REVIEW OF SYSTEMS
[Fever] : no fever [Chills] : no chills [Night Sweats] : no night sweats [Fatigue] : fatigue [Chest Pain] : no chest pain [Palpitations] : no palpitations [Lower Ext Edema] : no lower extremity edema [Shortness Of Breath] : no shortness of breath [Cough] : no cough [Abdominal Pain] : no abdominal pain [Vomiting] : no vomiting [Constipation] : no constipation [Dysuria] : no dysuria [Incontinence] : no incontinence [Joint Pain] : joint pain [Joint Stiffness] : no joint stiffness [Muscle Pain] : no muscle pain [Muscle Weakness] : no muscle weakness [Dizziness] : no dizziness [Hot Flashes] : hot flashes [Easy Bleeding] : no tendency for easy bleeding [Easy Bruising] : no tendency for easy bruising [de-identified] : occasional

## 2025-02-27 NOTE — ASSESSMENT
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 He started abiraterone + prednisone in June 2020, dose was reduced to 500mg d/t transaminitis. Abiraterone discontinued late December 2023 for disease progression. Xtandi started Jan 2024.  He experienced transient benefit but appears to have progressing disease at this time as manifested by steadily rising PSA since 6/2024 and new lesions noted on CT imaging performed 10/30/24.   We discussed these results and that he will need an alternative therapy to control his disease. Standard of care options include taxane chemotherapy and Pluvicto. Given further increase in PSA to 21.4, we discussed the potential risks and benefits associated with docetaxel. He was amenable and signed informed consent.   He is tolerating treatment well to date. He is noted to have worsening anemia. To reduce likelihood of significant anemia, we will plan to reduce dose of docetaxel to 60mg/m2. He will return in 3 weeks.   mCRPC: now s/p POD on tawanda/pred ->enzalutamide.  Now on docetaxel. Dose reduced to 60 mg/m2 since C4 2/2 fatigue/cytopenias - Feb 2024 [facility] CDx was negative for any actionable mutations, MSI high not detected, TMB 8 muts/Mb, ctDNA <1%.  - Continue Eligard q6mo injection with urology, given 12/12/24, next scheduled for 6/2025 - Proceed with cycle 5 docetaxel, dose reduced to 60mg/m2 -Repeat imaging after C6. Orders placed today. -PSA downtrending. Continue to monitor.   Bones: - He is off Ca + D supplementation d/t hypercalcemia.  - Monthly Xgeva inj started 5/15/24, continue  Cytopenia/anemia -Likely treatment related. No bleeding. Monitor hgb.  -Docetaxel, dose reduced to 60mg/m2  Instructed to contact our office with any new/worsening symptoms. Pt and family member educated regarding plan of care, all questions/concerns addressed to the best of my abilities and their apparent satisfaction.  [AdvancecareDate] : 1/2/24 [FreeTextEntry2] : Health care proxy form provided to pt 12/5/23, he still has at home. Instructed once again to bring the completed form to his next office visit.

## 2025-02-27 NOTE — HISTORY OF PRESENT ILLNESS
[Disease: _____________________] : Disease: [unfilled] [T: ___] : T[unfilled] [N: ___] : N[unfilled] [M: ___] : M[unfilled] [de-identified] : This 66-year-old male was first seen in consultation on July 20, 2016. In August of 2013 his PSA was 4.3. In September 30, 2013 it was 4.9. He underwent a biopsy in March 5, 2014 revealing Ade 7 (3+4) disease. He underwent external beam radiation therapy for a total dose of 8100 cGy in 45 fractions from May 25 and total July 31 of 2014. He subsequently experienced PSA failure and brachytherapy was considered. A CT scan was performed as part of that evaluation revealing a filling defect in the left ureter. In March 2016, he presented to the emergency room with a creatinine of 13.7 and potassium of 8.9. Bilateral nephrostomy tubes were placed. He was found to have GI bleeding and a colonoscopy revealed evidence of radiation proctitis. He has been on Lupron. 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 His initial staging was stage II, [id number].    The original pathology was reviewed prior to radiation. The left base revealed adenocarcinoma the prostate, Ade score 7 (3+4) involving 2 out of 2 cores, 40% of each core, with perineural invasion. The left mid zone revealed adenocarcinoma the prostate, Ade score 7 and (4+3) involving 50% of each of 2 cores, with perineural invasion noted. The left apex had 3 of 3 cores involved. One core was Ade 7 (3+4) involving 100% of the core. The other 2 cores had a Ade 6 (3+3) in less than 5% of 2 cores. Perineural invasion was identified as well.  Posttreatment biopsy revealed the presence of adenocarcinoma within the prostate gland. The PSA was 11 at the time of the biopsy.  Feels well at initial consultation. Started Lupron in January 2016 before the renal failure/obstruction. No pains. Fatigue at times. Appetite good, stable weight. Lost weight with illness in March, typical weight prior was 210, left hospital at 170. Weight stable. He was transfused multiple times. Some urine through penis recently. Has bilateral percutaneous nephrostomies. No blood, dysuria, no incontinence. Nocturia x 2-3 at this time. Right sided nephrostomy still has output, small amount in left bag. Due for change of tubes in September. Had radiation proctitis and has laser treatment. Sees GI in follow-up next month.   10/18/16...Had a colonoscopy, showed radiation colitis. No further rectal blood noted. Urine flow mostly from PCNs, some from penis. No dysuria, some  hematuria noted at times. Appetite is normal. No weight loss. No fatigue. Hot flushes occur daily. .  4/18/17...last scans 9/26.  Saw PCP and had PSA done, shows slight increase over manjinder. he was given ferrous sulfate to take for anemia. No pains. Urine flow is mostly thru PCNs. Still has occasional blood in the urine, noted in the bag. Has less volume form the left side.  Hot flushes occur a few a day, 15-20 minutes, occ sweats. Appetite is good, no weight loss. Some fatigue noted. No edema. Occ blood with stools, saw Dr Grossman in September, due for follow up. has not had major bleeding recently.   8/1/17...Surya states that he is feeling good, his urine flow is strong, slight increase, no pains anywhere, he gets hot flashes a "few  day, they are coming." He has some minor fatigue, He has percutaneous nephrostomies, He follows with Dr. Dick, no obvious hematuria, no breast tenderness.  11/14/17...Received Lupron from Dr Dick a few weeks ago. PSA was 0.02 on 8/1/17. He gets hot flashes a couple times a day, He has b/l nephrostomies, is able to pass urine through his penis. He denies hematuria, dysuria He states appetite is good. He gets some fatigue, He denies any pains.  4/17/18...Received Lupron from Dr Dick Jan 23, 2018. Not seen since November 17, due to illness. 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 Feels well. No pains noted. Some fatigue. Appetite is good. Reports that he has diarrheal for 2-3 months, not daily. He had loose stools this AM, took Imodium. Diarrhea occurs 1-2 days a week, the n stops. Some flatus, no cramps. No blood in the stool. Urine flow is via nephrostomy, minor amount thru penis, no dysuria, no incontinence. No blood in urine. Hot flushes most days.    Minor fatigue. Appetite normal.   7/17/18...On CAB. Feels "okay". No pains. Has bilateral nephrostomy tubes, due for change in August. No recent infections, occasionally passes urine via penis, no blood, no dysuria. Still has hot flushes, daily, more in the winter. Some fatigue, no physical impairment. Appetite is good. Weight stable. Diarrhea persists, on and off, not daily. Watery at times. takes Imodium on occasion. Occ small amount of blood with hard stools, secondary to radiation proctitis.   10/23/18...Feels "okay". No pains. Urine flow is minimal, most comes out of the nephrostomy tubes, nocturia -none. No blood, no dysuria. No incontinence. Hot flushes occur, day and night. Appetite is good, no weight. No dyspepsia, no nausea, no vomiting. Diarrheal stools occur episodically, every 2-3 weeks. No blood in the stool. Usually 1-2 BMs per day. Occasionally notes blood on toiler paper with hard stool. has RT proctitis.   2/5/19...Feels well, no pains. has hot flushes frequently, daily. Has some sweats with them at night. Appetite is good, no weight loss. Urine flow is minimal thru penis, nocturia does not occur. Most urine form PCNs, last catheter change January 2019, by IR at [facility]. Occ minor blood in the urine, when the catheter is loose. No back pains, no bone pains. Occ fatigue.   5/7/19...Had a calcium of 11.1 last visit. Called him and told to stop calcium and vit D. Repeat 3 weeks later was normal. Continues on Lupron and Casodex. Feels well. Hot flushes every day. He has some fatigue, unchanged. Appetite is good, no weight loss. Urine is minimal from penis, has bilateral PCNs. No bone pains. No edema.   8/8/19...Feels "all right", as a URTI. No pains noted. Hot flushes multiple time a day. Fatigue is present, mild. Urine flow is 'good', PCN bilateral, most flow form the right. Some flow thru penis, voids 1-2 times a day. NO blood, no dysuria. Due for PCN change next week. Appetite is good, no weight change.  10/29/19...Local failure prostate cancer, on CAB. Feels well. No pains., Urine flow is good. Has bilateral PCNs. Most of urine goes to PCNs. Occ small amount of blood in the bag. Voids 2 times a day. Hot flushes, daily, with sweats on occasion. has fatigue at times. Appetite is good, no weight loss. NO leg edema. No bleeding from the RT proctitis recently  1/28/20...Stopped bicalutamide after last visit. Last seen 3 months ago. PSA on 1/7 was 0.27, thus continued to rise somewhat. NO bone pains. Hot flushes daily, occ sweats. Appetite is good, no weight loss. No fatigue of note. Urine flow is good, but most flow is into the PCNs. Last change of PCNs was December after he had some leaking on one side. No hematuria, no dysuria. No incontinence.   5/6/20...Verbal permission granted for telephone services by patient, Surya Cervantes, on 5/6/20 at 1:35 PM.   Fells well. No pains noted. Nephrostomy tubes in place, replaced last week. Appetite is good, no weight loss. Hot flushes daily. He has chronic fatigue complaints. No leg edema noted. Urine through penis is minimal, only when tubes become clogged. No blood in the urine. No nausea, no vomiting. Has diarrhea at times, once every 2-3 weeks. No back pains noted. No fevers, no chills., No cough, no sputum.   6/17/20...Verbal permission for telephone contact was granted by the patient, Surya Cervantes, on June 17, at 4 PM. Feels "all right". NO pains. Hot flushes, daily, occ minor sweats. Fatigue is present, rated mild, occasional naps. Appetite is good, no weight loss. No edema. Urine flow is minimal from penis, has bilateral PCNs. No blood. No incontinence. No recent infections. No cough NO CHEN. No F/C. Helps in care of his mother, watches TV, not very active. Jeana from [employer].   7/29/20...Verbal permission for telephone contact was granted by the patient, Surya Cervantes, on July 29, 2020 at 3:10 PM.  Started tawanda/pred on June 16, 2020. Feels  "pretty good". He was having diarrhea prior and he no longer had diarrhea.   No pains at this time. No edema. No headaches. Checks BP on occasion.  BPs have been "normal", he will check often. Urine flow "about the same",. Most urine comes from PCNs. No nocturia, no  hematuria, no dysuria. Appetite is normal, thinks he may have lost a few pounds. No cough, No CHEN. NO F/C. Hot flushes occur daily. No change. Mild fatigue.  8/26/20...On abiraterone and prednisone since June, PSA dropped significantly. taking meds correctly. Feels 'all right", no pains. Fatigue is present, as before, "not terrible", takes naps. Slightly more fatigue than before. Appetite is good, but lost weight. eats with his mother, she eats less and so does he. No nausea, no vomiting. No headaches, no edema of note. Most of urine comes from PCNs, Urine from penis is minimal, does not get up at night. No blood, no dysuria. No incontinence. No blood in the PCN bags.  Has leg cramps daily, particularly in the AM. Dr Angulo administers Lupron, due for Rx on 9/1/20.   9/22/20...He is seen in the office today in follow-up. He's been on abiraterone and prednisone since June. He had elevated transaminases on September 3 and his abiraterone was subsequently held. He will have repeat blood work done today after today's visit. He states that he feels "all right." His appetite is stated to be "all right." His weight has been stable. He denies dyspepsia, nausea or vomiting. He denies constipation or diarrheal stools. There is no cough or shortness of breath. He denies skin rashes or pruritus. There are no complaints of pains at this time. Fatigue is present but it does not impair his daily activities. He takes occasional naps during the day. He denies arthralgias or myalgias. There are no headaches or dizziness. He has bilateral percutaneous nephrostomy tubes draining yellow urine. There is no blood in the percutaneous nephrostomy bags. Most of the urine comes from the percutaneous nephrostomy tubes with minimal amount of urine coming out through the penis. There is no nocturia. There is no hematuria or dysuria. There is no incontinence. There is no edema in the extremities. He has occasional hot flashes but felt it has improved these past few days. He denies fevers or chills. He infrequently checks his blood pressure at home. He would get blood pressure readings ranging from 150s-160s systolic over 80s-90s diastolic. He remains independent in his activities of daily living.   10/27/20...Verbal permission for telephone services granted by the patient,  Surya Cervantes on October 27, 2020 at 3:48 PM.  Stared abiraterone in June. Feels "pretty good". NO increased edema. No headaches. Hot flushes are less. Appetite is good, no weight loss. Has PCNs, has urine form the penis at times. NO blood in the urine. No dysuria. No nocturia. No incontinence. No bone pains noted. Fatigue  RTs, takes a nap at times. Cares for his elderly mother at home.   11/25/20...Verbal permission for telephone services granted by the patient,  Surya Cervantes on November 25, 2020 at 1:50 PM On 1/2 dose tawanda due to transaminases. No pains, feels "all right". Some fatigue. Urine flow form penis in minimal, has bilateral PCNs. Occasional small amount of blood in the right PCN tube. Appetite is good, no weight loss. No cough, no CHEN. Hot flushes daily. No headaches, no edema. BP monitored at home, 151/89, pulse 95.   12/23/20...Verbal permission for telephone services granted by the patient,  Surya Cervantes on December 23, 2020 at  1:32 PM.  Feels well. No pains noted. Urine flow RTS. Has PCNs, minimal urine via penis on occasion. No blood in urine or tubes of note. Appetite is good, no weight loss. NO edema of the legs. NO headaches, no dizziness. No cough, no CHEN. No F/C. Hot flushes occur daily, less often. Due Lupron next month from Dr Angulo. Mild fatigue, take a nap. Independent in ADLs, cares for his mother. BP at home 138/90, pulse 71. Left arm 147/92  1/28/21...Verbal permission for telephone services granted by the patient,  Surya Cervantes on January 28, 2021 at 315 PM.  Surya reports that he has no pains.  There is no significant urine within his blood.  In the interim, he had to have the left percutaneous nephrostomy tube changed as there was pus present.  They had some difficulties apparently.  The tube was clogged and he was unable to change it over a guidewire.  He was not treated with any antibiotics.  He does have some urine from the penis at times.  There is no fevers or chills.  His appetite is good and there is no weight loss.  He reports that his weight is 190 pounds today.  He says his blood pressure was 146/92 in the left arm and 145/85 on the right arm.  The pulse rate was 75.  He reports no edema or headaches.  There are some hot flushes.  2/25/21...Verbal permission for telephone services granted by the patient,  Surya Cervantes on 2/25/21 at 3:10 PM Feels "all right". NO pains. He was having issues with PCNs clogged, had to have removal and reinsertion. It was the right side this week, done on Tuesday. Told to flush it daily rather than every 3 days. Appetite is good, no weight loss, no edema. No cough, no CHEN. BP is monitored, right side 139/84 today, left 134/82, pulse 79. No headaches, no significant fatigue, does nap on occasion. NO leg weakness. No N/V/D/C. Minimal urine from penis. There was more flow when the dysfunction occurred. NO blood in the urine, no dysuria.   4/7/21...  10/26/21....Last evaluation was 4/7/21, by telephone. Completed antibiotics. Was in rehab for 6 weeks. Strength is good, no pains. Appetite is good, gained some weight. urine flow is good, no incontinence, very little urine from penis, mostly from the tubes. No headaches, no dizziness, no balance issues, No falls. Hot flushes occur. Lives with mother, helps her. No edema at this time.   From neurosurgery, this summary of events....68 yo Male w/ PMHx of HTN, prostate CA (on chemo, s/p radiation), b/l nephrostomy tubes, presented  to [facility] ED on 6/14/21 with weakness and confusion. In ED foulurine in b/l nephrostomy bags noted, tachycardic, and hypotensive. s/p IVF and levo drip for septic shock, developed fluid overload with pitting edema and decreased  EF. Imaging of the spine concerning for discitis and osteomyelitis c-spine with T1 inflammation/signal changes.   Hospital coursed remarkable for BC + Klebsiella and strep anguinous. Started on vanco/zosyn 6/14. As per ID switched to ceftriaxone 2 g on 6/17. Hospitalization c/b ASHLEY on CKD with right hydronephrosis. IR consulted- no intervention, both nephrostomy tubes flushing adequately. Nephrology  consulted, started on stress dose steroids/hydrocortisone 50 mg q6 and Midodrine. CTAP showed Sludge in gallbladder.  Patient  underwent C6-7 corpectomy and posterior fusion C4-T2 on 6/29/21. Hospital course c/b tachycardia, fever with low O2 sats. Chest CT negative for  PE, lower extremity Dopplers negative. s/p PICC, discharged to inpatient rehab on 7/12/21 and then was discharged home after completion of rehab.   Last Eligard was August with Dr Angulo, 30 mg dose.   11/30/21...on tawanda/pred since June 2020. Feels  "all right". Has hot flushes and fatigue. Does not prevent activities. He falls asleep easily if he sits in a chair. Independent in ADLs, cares for his mother. No infections recently, No F/C. No recent adventures with with his PCNs. No blood in urine, occ urine via penis. Appetite is good, gained 2.2 kilos. Edema decreased. No chest pain/pressure. No cough/ CHEN. No headaches, no dizziness. No N/V/D/C.   1/11/22....telehealth today. Feels  "all right". No pains of note. t flushes daily. Has some fatigue as well. No edema noted. NO cough/CHEN No chest pain/pressure. No F/C. Appetite is good, no weight loss. Weighs 176. Checks BP at home, 142/98 RP 72 today, L arm, 142/91. Takes terazosin at night. Sees PCP in March. Needs to call PCP for tighter BP control. NO headaches, no dizziness. No F/C. No hematuria, has bilateral PCNS, has some small amount of urine from the penis. Had change of PCNs last week. Independent in ADLs.   2/8/22 - telehealth visit today. Continues on abiraterone 500mg daily + prednisone. Reports his BP today was 143/94 with HR of 74. Overall feeling "alright". Moderate fatigue, takes naps during the day. Occasional hot flashes. Appetite is good, weight today is 180lbs, he attributes recent weight gain to eating better after being discharged from the hospital. Mild ptosis of left eyelid comes and goes and has reportedly been stable for his "whole life". Bilat percutaneous nephrostomy tubes in place, no hematuria. Ongoing mild BLE edema is reportedly stable. Ongoing intermittent arthritis pains of left knee and right shoulder are stable. Denies fever, chills, headache, dizziness, balance issues, eye pain/problems, mucositis/odynophagia, chest pain, palpitations, SOB, cough, nausea/vomiting, diarrhea/constipation, abdominal pain, hematuria, rash/pruritus, neuropathy, bleeding, weakness, anxiety/depression.  3/17/22...tawanda/pred started mid June 2020. PSA was 11.6 at start. Feels "all right". No pains. No major issues with the PCNs, having a change done next week. Appetite is good, no weight loss. No cough, No CHEN. Some leg edema. No chest pain/pressure. Urine from penis is relatively little. No dysuria, no blood. has calcium oxalate excretion, which causes his tubes to be clogged. Notes some bruising. NO headaches, no dizziness, no balance issues. No F/C. No N/V/C, some minor diarrheal stools at times. Takes Imodium as as needed. Fatigue is present at times, has hot flushes occur multiple times a day.   4/20/22 - continues on abiraterone (500mg daily) + prednisone since June 2020 for mCRPC. Overall feeling "alright", ongoing mild fatigue is stable. Intermittent hot flashes are tolerable. Mild night sweats during the summer when it gets warmer. Ongoing mildly productive cough which he's had for "a long time". Occasional diarrhea that doesn't last more than a day. Bilateral percutaneous nephrostomy tubes in place, passing minimal urine from his penis. Occasional hematuria especially from right nephrostomy bag, clear today. Ongoing mild BLE edema. Ongoing chronic left knee swelling and pain 2/2 arthritis. Ongoing right shoulder pain 2/2 arthritis as well. Denies fever, chills, headache, dizziness, balance issues, eye pain/problems, mucositis/odynophagia, chest pain, palpitations, SOB, nausea/vomiting, constipation, abdominal pain, rash/pruritus, bleeding, muscle pain/weakness  5/24/22 - continues on abiraterone (500mg daily) + prednisone since June 2020 for mCRPC. BP today is 164/95, asymptomatic. BP at home has been 150s/80-90s. On occasion the DBP has been >100. Overall feeling "alright", ongoing fatigue is stable. Ongoing intermittent hot flashes. Appetite is good. Vision is changing in his left eye, he has an appt with [employer] this Thurs. Bilateral nephrostomy tubes in place, occasional hematuria at times resolves after 2-3 days. Ongoing trace edema of BLEs. Intermittent pruritus around nephrostomy tube dressings. Denies fever, chills, night sweats, headache, dizziness, balance issues, mucositis/odynophagia, chest pain, palpitations, SOB, cough, nausea/vomiting, diarrhea/constipation, abdominal pain, dysuria, incontinence,  rash, neuropathy, bleeding, muscle or joint pain/weakness.   6/21/22 - continues on abiraterone, half dose + prednisone since June 2020 for mCRPC. PSA 11.6 at start of treatment in June 2020, manjinder PSA at o.o5 August 2021, slow rise, recent PSA in May 2022 was 0.17.  feels  "all right". No pains noted. Has bilateral PCNs, changed last week. No infections, no F/C. Some fatigue, always, no worse. Appetite is good, no weight loss. NO headaches, no dizziness, no balance issues. Some edema noted, as before. No chest pain/pressure. NO bone pains. No N/V/D/C. Small amounts of urine via the penis. Hot flushes daily, multiple times. No cough, no CHEN.   7/28/22 - continues on abiraterone 500mg + prednisone since June 2020 for mCRPC. Overall feeling "alright", ongoing mild fatigue for which he occasionally takes a nap once a day. Ongoing hot flashes are tolerable. Appetite is good. Occasional cough. Bilateral percutaneous nephrostomy tubes in place passing most of the urine through the bags but still passing minimal urine via penis, no nocturia. Trace edema of BLEs. Notes intermittent cramping of hands which may be arthritis as it improves with movement. Has ongoing intermittent right buttock pain radiating down to the knee, present for the past year, pain is worse with sitting for a long time, improves with changing positions, max pain is 3/10 not requiring medication. Denies fever, chills, night sweats, headache, dizziness, balance issues, eye pain/problems, mucositis/odynophagia, chest pain, palpitations, SOB, nausea/vomiting, diarrhea/constipation, abdominal pain, dysuria, hematuria, incontinence, rash/pruritus, bleeding, weakness.   10/11/22 - continues on abiraterone 500mg daily + prednisone since June 2020 for mCRPC. Neurosurgical notes reviewed...."S/P cervical spinal fusion, 70 year old male 15 months pos op C6-7 corpectomy and posterior fusion C4-T2 on 6/29/21 with Dr. Tiffanie Martinez". Now seeing Dr Resendiz. Overall, feels "all right". No pains noted. urine flow is "fine", no incontinence, most urine still out PCNs, some from the penis. No blood, no dysuria. Hot flushes daily.  Some fatigue, RTS. Appetite is good, no weight loss. No headaches, dizziness, no balance issues, No paresthesias. Occ cough, sputum is white. No CHEN. Tubes to be changed next week. No N/V/D/C.  Some edema , improved. No chest pain/pressure  11/9/22 - continues on abiraterone 500mg daily + prednisone since June 2020 for mCRPC which he is tolerating well. Pt reports feeling generally well. No new complaints. Bilateral nephrostomy tubes in place, denies hematuria . Ongoing trace edema of BLEs. Intermittent pruritus around nephrostomy tube dressings. Denies fever, chills, night sweats, headache, dizziness, balance issues, mucositis/odynophagia, chest pain, palpitations, SOB, cough, nausea/vomiting, diarrhea/constipation, abdominal pain, dysuria, incontinence, rash, neuropathy, bleeding, muscle or joint pain/weakness. Appetite reported as good. No changes in medications  12/7/22 - continues on abiraterone 500mg daily + prednisone since June 2020 for mCRPC.  gets his Eligard 45 tomorrow form Dr Angulo. feels "all right". No pains noted except for arthrtic complaints. Appetite is okay< dropped a few pounds. usual edema, no change. No headaches, NO dizziness. Most of the urine comes from PCNs, changed every 6 weeks due to prior infections. NO fevers, no chills. Hot flushes occur, 1-2 times a day. Notes a bit more urine thru penis lately. Occ cough, no CHEN. No chest pain/pressure/palpitations. NO N/V/D/C. Mild fatigue. Independent in ADLs.   1/4/23 - continues on abiraterone 500mg daily + prednisone since June 2020 for mCRPC. Overall feeling "alright", ongoing fatigue is stable. Ongoing hot flashes are tolerable. Appetite is "ok". Ongoing occasional productive cough is stable. Bilateral perc nephrostomy tubes in place, continues to pass some urine through the penis, no nocturia. Denies fever, chills, night sweats,headache, dizziness, balance issues, eye pain/problems, mucositis/odynophagia, chest pain, palpitations, SOB, nausea/vomiting, diarrhea/constipation, abdominal pain, dysuria, hematuria, incontinence, LE edema, rash/pruritus, bleeding, muscle or joint pain/weakness.   2/13/23..... continues on abiraterone 500mg daily + prednisone since June 2020 for mCRPC. treated with cephalexin and developed rash, red skin, pruritus. Chart labeled as allergy. Hospitalization was brief. sudden onset of symptoms, Hd diarrheal stools as well. Feels  "pretty good", except for residual pruritus. No pains noted.  Has tube change set for next week. Appetite has retuned. No N/V. Diarrhea resolved, NO taste alteration. No headaches, no dizziness. had some edema. resolved. No chest pain/pressure/palpations.  has his usual fatigue and hot flushes. Cough, chronic, with clear sputum. No CHEN/SOB. No fevers of chills since discharge  Hospital Course:  Discharge Date	01-Feb-2023  Admission Date	29-Jan-2023 17:52  Reason for Admission	Syncope  Hospital Course	  69 yo M w/ HTN, gout, prostate cancer s/p b/l nephrostomy tubes, on abiraterone, admitted w/ sepsis 2/2 UTI, improved on antibiotics.  Sepsis secondary to UTI.  was febrile, tachycardic in the ED, met criteria for sepsis on admission  - U/A w/ +nitrite, large LE, prior cultures with klebsiella and E. coli  - current Ucx >3 organisms suggestive of collection contamination  - received IV zosyn (1/29/23-2/1/23),  Bcx negative, discharge on oral cephalexin to complete 10 days for complicated UTI (end date 2/7/23)  - sepsis resolved (currently afebrile, without leukocytosis, tachycardia resolved)  - IR consulted nephrostomy tubes in position and draining well, no indication for exchange at this time.   Gastroenteritis.   had reported nausea/vomiting/diarrhea after eating fried rice w/ beef  - s/p IV fluids, now resolved.   Syncope.   reports syncopal episode likely 2/2 dehydration from gastroenteritis  - s/p IV fluids, EKG sinus rhythm, troponin indeterminate but no increased  - reports symptoms resolved, ambulating around unit independently (advised call don't fall).   3/21/23..... continues on abiraterone 500mg daily + prednisone since June 2020 for mCRPC. treated with cephalexin and developed rash, red skin, pruritus. Chart labeled as allergy. Overall, he feels "all right". No pains of note. Appetite is good, no weight loss. No edema. No chest pain/pressure/palpitations. some cough, no CHEN. Hot flushes daily. Fatigue is present.  Does yoga, runs in place. No HA, no dizziness, no falls. Independent in ADLs. no fevers, no chills. Minimal urine from penis, no dysuria. PCNs are changed every 6 weeks, no blood in tubes. No N/V/D/C.   4/17/23.....  on abiraterone 500mg and prednisone since June 2020 for mCRPC. Prior transaminitis led to decrease in dose. "doing okay". Minor discomfort on left side near the PCN. Has change every 6 weeks at this time. Appetite is good, no weight loss. No N/V/D/C. Has some urine form the penis, noted if PCNs pinch. No blood in the urine, no hematuria. Hot flushes as before, about 3 times a day.  fatigue RTS. No cough, no CHEN, No fevers, no chills. No edema, no chest pains,pressure/palpitations  Exercises every AM.   5/16/23....continues on abiraterone 500mg and prednisone since June 2020 for mCRPC. Prior transaminitis led to decrease in dose. Eligard next month with Dr Angulo. Overall, feels "all right". No pains noted. Appetite is good, no weight loss. No N/V/D/C. Urine from penis on occasion, empties bladder before bed. No hematuria, no dysuria. No fevers, no chills. PCNs were changed last week. No headaches, no dizziness, no balance issues.  Exercises daily, walking and running. Occ cough, no CHEN. No chest pain/pressure/palpitations. Hot flushes remains. No fevers, no chills  6/12/23.... on abiraterone 500mg and prednisone since June 2020 for mCRPC. Prior transaminitis led to decrease in dose. Mara with Dr Angulo. Feels "well". NO pains, except for radicular pain in right thigh, present for about 6 weeks, constant. uses a topical, icy-hot. Does not awaken him, better when he is lying down, aggravated by ambulation. Appetite is normal. No weight loss. NO fevers, no chills. NO fatigue. No headaches, no dizziness, no paresthesias. No cane. No falls. No cough, no CHEN. NO N/V/D/C. Urine from PCNs. Some from the penis, no hematuria, no dysuria. No nocturia. Hot flushes daily. No edema. NO chest pain/pressure/palpitations. No back pains. Independent in ADLs  7/18/23 - continues on abiraterone + prednisone (500mg daily d/t transaminitis) for mCRPC since June 2020. Ongoing fatigue after periods activity, naps once a day. Ongoing hot flashes are tolerable. Appetite is good. Occasional cough. Bilateral perc nephrostomy tubes in place, scant blood if the tubing moves around but not persistent. Ongoing pain in right buttock radiating down posterior thigh/leg, no pain with lying down, pain is worse with sitting for a prolonged time and improves with walking, max pain is 8/10. Not taking any pain medication as the pain eases up when he gets up and walks around.   8/16/23 - continues on abiraterone + prednisone (500mg daily d/t transaminitis) for mCRPC since June 2020. Overall feeling "alright", ongoing fatigue is stable. Ongoing frequent hot flashes, tolerable. Appetite is "alright". Ongoing occasional cough. Perc nephrostomy tubes exchanged 2wks ago, occasional transient hematuria which he attributes to accidentally pulling on the tubes. Ongoing stable intermittent right buttock pain radiating down posterior thigh down to the calf, improves with movement and worse with sitting for a long time, max pain is 5/10. Using a topical cream called "[place] Dream" which has been helpful.   9/20/23 - continues on abiraterone + prednisone (500mg daily d/t transaminitis) for mCRPC since June 2020. last PCN change was last week. On a 6 week change protocol. Feels "not bad", chronic leg pains, form right buttock, down the leg. Passing urine via penis, very small amounts. No blood, no dysuria. No nocturia. No incontinence, voids about 2 times a day. Hot flushes, every day, no sweats. Fatigue is present, naps, feels refreshed afterwards. Does yoga, stretching, walking, lifts some weights, daily basis. Appetite is good, no weight Kenji Occ cough, no CHEN. No edema, no chest pain/pressure.   10/31/23 - continues on abiraterone + prednisone (500mg daily d/t transaminitis) for mCRPC since June 2020. Overall feeling "alright", ongoing fatigue is stable. Occasional hot flashes. Appetite is "alright". Perc nephrostomy tubes in place, passing minimal urine through his penis. Ongoing pain in right buttock that radiates down the posterior leg, worse in the morning when first waking up and worse with sitting for a prolonged period, improves/resolves with activity. Denies fever, chills, night sweats, headache, dizziness, balance issues, chest pain, palpitations, SOB, cough, nausea/vomiting, diarrhea/constipation, abdominal pain, dysuria, hematuria, incontinence, LE edema, bleeding.   12/5/23 - continues on abiraterone + prednisone (500mg daily d/t transaminitis) for mCRPC since June 2020. Overall feeling well, some fatigue but remains active and exercising regularly. Ongoing hot flashes are tolerable. Appetite is good. Bilateral percutaneous nephrostomy tubes in place, no hematuria. Ongoing right buttock pain that radiates down the posterior leg, worse in the morning and improves with activity, max pain is 4-5/10, declines referral to PM&R. Denies fever, chills, night sweats, headache, dizziness, balance issues, chest pain, palpitations, SOB, cough, nausea/vomiting, diarrhea/constipation, abdominal pain, LE edema, bleeding.  1/2/24 - on abiraterone + prednisone since June 2020, discontinued 12/26/23 for POD seen on 12/21/23 bone scan. Feeling well, no significant fatigue. Remains active and doing exercises daily. Occasional hot flashes are tolerable. Bilateral percutaneous nephrostomy tubes in place, no hematuria. Ongoing right buttock pain that radiates down the posterior leg, worse in the morning and improves with activity, max pain is 7-8/10, pain is stable. Denies fever, chills, night sweats, headache, dizziness, balance issues, chest pain, palpitations, SOB, cough, nausea/vomiting, diarrhea/constipation, abdominal pain, LE edema, bleeding.   2/14/24 - Xtandi started early Jan 2024 for mCRPC. Over the past week he's been very fatigued. Ongoing hot flashes. Appetite is decreased and not eating as much as he used to, weight is down 11lbs over the past 6wks. Stable cough since before start of Xtandi. Percutaneous nephrostomy tubes in place, no hematuria. Notes some pains in the hands, knees, and ankles which he attributes to arthritis. Ongoing pain in right buttock radiating down posterior leg, pain is worse in the AM when first waking up and improves with activity/walking, pain waxes and wanes, max pain is 7/10 but not taking any medication for this. Denies fever, chills, night sweats, headache, dizziness, balance issues, chest pain, palpitations, SOB, nausea/vomiting, diarrhea/constipation, abdominal pain, LE edema, bleeding.   3/14/24 - continues on Xtandi since early Jan 2024 for mCRPC. Overall feeling ok, fatigue is a little improved which he believes is because his BP hasn't been as low as it was in the past. BP at home has been 120's/70-80's in the mornings. Ongoing hot flashes are not as intense. Appetite is decreased, weight is down 7lbs over the past month, but overall down 18lbs over 2 months. Urine from bilateral percutaneous nephrostomy tubes is "good", no hematuria. Ongoing pain in right buttock down the posterior leg, pain is worse with sitting for a prolonged period, improves with getting up and moving around, not interfering with his sleep, not needing any medication for pain. Denies fever, chills, night sweats, headache, dizziness, balance issues, chest pain, palpitations, SOB, cough, nausea/vomiting, diarrhea/constipation, abdominal pain, LE edema, bleeding.  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 NO AAW benefit. Started tawanda/pred mid June 2020, discontinued 12/26/23 for disease progression. Xtandi started early Jan 2024, decreased to 80mg daily in May 2024 d/t weight loss. Switched to docetazel in 12/2024 due to PSA progression. [de-identified] : 4/16/24 - continues on Xtandi since early Jan 2024 for mCRPC. Excess lambda light chains on last blood work, no M-spike. Feels well. no Pains. Has to some fatigue, naps during the day. Appetite is "good", No N/V/D/C. No cough, no CHEN. NO chest pain/pressure. No headaches, no dizziness. No balance issues, minimal, walks with a cane. No falls. Urine  flos via tubes, no recent infections. NO blood in urine. Some minor urine amounts from penis. No nocturia, no dysuria. Hot flushes occur, less than before, 1-2 times a day. No edema noted. Independent in ADLs.  CDX done, no targets  5/15/24 - continues on Xtandi since early Jan 2024 for mCRPC. Feeling "alright". Ongoing fatigue, naps 1-2x/day. Occasional hot flashes. Decreased appetite, not eating as much as before, drinking 1-2 Ensure per day, weight is down 5lbs over the past month. Bilateral percutaneous nephrostomy tubes in place, no hematuria. Ongoing arthritic pains of the shoulders and hands at times. Denies fever, chills, night sweats, headache, dizziness, balance issues, chest pain, palpitations, SOB, cough, nausea/vomiting, diarrhea/constipation, abdominal pain, LE edema, bleeding.   6/12/24 - continues on Xtandi since early Jan 2024 for mCRPC, decreased to 80mg daily as of May 2024 for weight loss. Ongoing fatigue is stable. Ongoing hot flashes. Appetite is "a little better". Urine from nephrostomy tubes is clear yellow, no hematuria. Denies fever, chills, night sweats, headache, dizziness, balance issues, chest pain, palpitations, SOB, cough, nausea/vomiting, diarrhea/constipation, abdominal pain, LE edema, bleeding, muscle or joint pain/weakness.  7/11/24 - on Xtandi since early Jan 2024 for mCRPC, decreased to 80mg daily as of May 2024 for weight loss. Patient accompanied by brother, reports to be feeling well, appetite slowly improving, continues to have nephrostomy tubes in places, last changed 23 weeks ago draining clear yellow urine. Denies fever, chills, headaches, chest pain, sob, abdominal pain/back pain.   8/14/24 - on Xtandi since early Jan 2024 for mCRPC, decreased to 80mg daily as of May 2024 for weight loss. Overall feeling well, ongoing fatigue is stable. Ongoing hot flashes.  Appetite is stable, weight is up a couple lbs since last visit. Diarrhea yesterday "all day" with >6-7 episodes of soft/loose stool, he took PRN Imodium yesterday, no more stool today, unsure if it was r/t something he ate. Urine from nephrostomy tubes is "good", no hematuria. Denies fever, chills, night sweats, headache, dizziness, chest pain, palpitations, SOB, cough, nausea/vomiting, constipation, abdominal pain, LE edema, bleeding, muscle or joint pain/weakness.  9/17/24 - on Xtandi since early Jan 2024 for mCRPC, decreased to 80mg daily as of May 2024 for weight loss. Feeling "alright", stable fatigue. Ongoing hot flashes are tolerable. Appetite is stable. Having intermittent diarrhea approx every other week with approx 3 episodes when it occurs but resolves with PRN Imodium 1-2 tabs, having normal BMs in between, unsure if diarrhea is r/t eating spinach. Bilateral nephrostomy tubes in place. Denies fever, chills, night sweats, headache, dizziness, chest pain, palpitations, SOB, cough, nausea/vomiting, constipation, abdominal pain, hematuria, LE edema, bleeding, muscle or joint pain/weakness.  10/16/24 - on Xtandi since early Jan 2024 for mCRPC, decreased to 80mg daily as of May 2024 for weight loss. Ongoing fatigue is stable. Ongoing hot flashes are tolerable. Appetite is "alright". Occasional diarrhea is improved, only 2 episodes of diarrhea over the past month. Bilateral percutaneous nephrostomy tubes in place, still passes some urine through his penis. Ongoing intermittent arthritic pains of the shoulders and knees are stable since before cancer diagnosis. Denies fever, chills, night sweats, headache, dizziness, chest pain, palpitations, SOB, cough, nausea/vomiting, constipation, abdominal pain, dysuria, hematuria, incontinence, LE edema, bleeding.   11/14/24: presents for follow up and management of metastatic CRPC on ADT + reduced dose enzalutamide in setting of weight loss and fatigue. His PSA has been noted to be rising over the last several visits from a manjinder of 8.13 to 15.3 at last visit on 10/16/24.   12/2/24: He presents for follow up and cycle 1 of docetaxel 75mg/m2 after his PSA was noted to rise further from 15.3 to 21.4. He has stopped enzalutamide.  He denies any new significant complaints since last visit.   12/23/24: He presents for follow up and cycle 2 of docetaxel 75mg/m2. He notes transient diarrhea and increased fatigue after cycle 1. Also notes alopecia and increased blood pressure when he takes steroid premedication. Denies fever, chills, night sweats, bony pain.  1/13/25:  He presents for follow up and cycle 3 of docetaxel 75mg/m2. He denies any significant change in symptoms. He has intermittent leaking from nephrostomy sites, increased on days of tx.  Notes continued fatigue. Also notes alopecia and increased blood pressure when he takes steroid premedication. Denies fever, chills, night sweats, bony pain.  2/3/25:  He presents for follow up and cycle 4 of docetaxel. He denies any significant change in symptoms.  Notes continued fatigue. Also notes alopecia and increased blood pressure when he takes steroid premedication. Denies fever, chills, night sweats, bony pain.   2/27/25: He presents for follow up and cycle 5 of docetaxel...now dose reduced to 60mg/m2 2/2 cytopenias and fatigue.  He denies any significant change in symptoms.  Notes continued fatigue. BP has been stable. Denies fever, chills, night sweats, bony pain. Last imaging in October. Will repeat after C6. PSA continues to trend downward.

## 2025-02-27 NOTE — REVIEW OF SYSTEMS
[Fatigue] : fatigue [Joint Pain] : joint pain [Hot Flashes] : hot flashes [Fever] : no fever [Chills] : no chills [Night Sweats] : no night sweats [Chest Pain] : no chest pain [Palpitations] : no palpitations [Lower Ext Edema] : no lower extremity edema [Shortness Of Breath] : no shortness of breath [Cough] : no cough [Abdominal Pain] : no abdominal pain [Vomiting] : no vomiting [Constipation] : no constipation [Dysuria] : no dysuria [Incontinence] : no incontinence [Joint Stiffness] : no joint stiffness [Muscle Pain] : no muscle pain [Muscle Weakness] : no muscle weakness [Dizziness] : no dizziness [Easy Bleeding] : no tendency for easy bleeding [Easy Bruising] : no tendency for easy bruising [de-identified] : occasional

## 2025-02-27 NOTE — PHYSICAL EXAM
[Fully active, able to carry on all pre-disease performance without restriction] : Status 0 - Fully active, able to carry on all pre-disease performance without restriction [Normal] : affect appropriate [de-identified] : (+) pallor (+) alopecia [de-identified] : anicteric  [de-identified] : no LE edema

## 2025-03-21 NOTE — REVIEW OF SYSTEMS
[Fatigue] : fatigue [Joint Pain] : joint pain [Hot Flashes] : hot flashes [Fever] : no fever [Chills] : no chills [Night Sweats] : no night sweats [Chest Pain] : no chest pain [Palpitations] : no palpitations [Lower Ext Edema] : no lower extremity edema [Shortness Of Breath] : no shortness of breath [Cough] : no cough [Abdominal Pain] : no abdominal pain [Vomiting] : no vomiting [Constipation] : no constipation [Dysuria] : no dysuria [Incontinence] : no incontinence [Joint Stiffness] : no joint stiffness [Muscle Pain] : no muscle pain [Muscle Weakness] : no muscle weakness [Dizziness] : no dizziness [Easy Bleeding] : no tendency for easy bleeding [Easy Bruising] : no tendency for easy bruising [de-identified] : occasional [de-identified] : hot flashes

## 2025-03-21 NOTE — HISTORY OF PRESENT ILLNESS
[Disease: _____________________] : Disease: [unfilled] [T: ___] : T[unfilled] [N: ___] : N[unfilled] [M: ___] : M[unfilled] [ECOG Performance Status: 1 - Restricted in physically strenuous activity but ambulatory and able to carry out work of a light or sedentary nature] : Performance Status: 1 - Restricted in physically strenuous activity but ambulatory and able to carry out work of a light or sedentary nature, e.g., light house work, office work [de-identified] : This 66-year-old male was first seen in consultation on July 20, 2016. In August of 2013 his PSA was 4.3. In September 30, 2013 it was 4.9. He underwent a biopsy in March 5, 2014 revealing Ade 7 (3+4) disease. He underwent external beam radiation therapy for a total dose of 8100 cGy in 45 fractions from May 25 and total July 31 of 2014. He subsequently experienced PSA failure and brachytherapy was considered. A CT scan was performed as part of that evaluation revealing a filling defect in the left ureter. In March 2016, he presented to the emergency room with a creatinine of 13.7 and potassium of 8.9. Bilateral nephrostomy tubes were placed. He was found to have GI bleeding and a colonoscopy revealed evidence of radiation proctitis. He has been on Lupron. 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 His initial staging was stage II, [id number].    The original pathology was reviewed prior to radiation. The left base revealed adenocarcinoma the prostate, Ade score 7 (3+4) involving 2 out of 2 cores, 40% of each core, with perineural invasion. The left mid zone revealed adenocarcinoma the prostate, Ade score 7 and (4+3) involving 50% of each of 2 cores, with perineural invasion noted. The left apex had 3 of 3 cores involved. One core was Ade 7 (3+4) involving 100% of the core. The other 2 cores had a Ade 6 (3+3) in less than 5% of 2 cores. Perineural invasion was identified as well.  Posttreatment biopsy revealed the presence of adenocarcinoma within the prostate gland. The PSA was 11 at the time of the biopsy.  Feels well at initial consultation. Started Lupron in January 2016 before the renal failure/obstruction. No pains. Fatigue at times. Appetite good, stable weight. Lost weight with illness in March, typical weight prior was 210, left hospital at 170. Weight stable. He was transfused multiple times. Some urine through penis recently. Has bilateral percutaneous nephrostomies. No blood, dysuria, no incontinence. Nocturia x 2-3 at this time. Right sided nephrostomy still has output, small amount in left bag. Due for change of tubes in September. Had radiation proctitis and has laser treatment. Sees GI in follow-up next month.   10/18/16...Had a colonoscopy, showed radiation colitis. No further rectal blood noted. Urine flow mostly from PCNs, some from penis. No dysuria, some  hematuria noted at times. Appetite is normal. No weight loss. No fatigue. Hot flushes occur daily. .  4/18/17...last scans 9/26.  Saw PCP and had PSA done, shows slight increase over manjinder. he was given ferrous sulfate to take for anemia. No pains. Urine flow is mostly thru PCNs. Still has occasional blood in the urine, noted in the bag. Has less volume form the left side.  Hot flushes occur a few a day, 15-20 minutes, occ sweats. Appetite is good, no weight loss. Some fatigue noted. No edema. Occ blood with stools, saw Dr Grossman in September, due for follow up. has not had major bleeding recently.   8/1/17...Surya states that he is feeling good, his urine flow is strong, slight increase, no pains anywhere, he gets hot flashes a "few  day, they are coming." He has some minor fatigue, He has percutaneous nephrostomies, He follows with Dr. Dick, no obvious hematuria, no breast tenderness.  11/14/17...Received Lupron from Dr Dick a few weeks ago. PSA was 0.02 on 8/1/17. He gets hot flashes a couple times a day, He has b/l nephrostomies, is able to pass urine through his penis. He denies hematuria, dysuria He states appetite is good. He gets some fatigue, He denies any pains.  4/17/18...Received Lupron from Dr Dick Jan 23, 2018. Not seen since November 17, due to illness. 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 Feels well. No pains noted. Some fatigue. Appetite is good. Reports that he has diarrheal for 2-3 months, not daily. He had loose stools this AM, took Imodium. Diarrhea occurs 1-2 days a week, the n stops. Some flatus, no cramps. No blood in the stool. Urine flow is via nephrostomy, minor amount thru penis, no dysuria, no incontinence. No blood in urine. Hot flushes most days.    Minor fatigue. Appetite normal.   7/17/18...On CAB. Feels "okay". No pains. Has bilateral nephrostomy tubes, due for change in August. No recent infections, occasionally passes urine via penis, no blood, no dysuria. Still has hot flushes, daily, more in the winter. Some fatigue, no physical impairment. Appetite is good. Weight stable. Diarrhea persists, on and off, not daily. Watery at times. takes Imodium on occasion. Occ small amount of blood with hard stools, secondary to radiation proctitis.   10/23/18...Feels "okay". No pains. Urine flow is minimal, most comes out of the nephrostomy tubes, nocturia -none. No blood, no dysuria. No incontinence. Hot flushes occur, day and night. Appetite is good, no weight. No dyspepsia, no nausea, no vomiting. Diarrheal stools occur episodically, every 2-3 weeks. No blood in the stool. Usually 1-2 BMs per day. Occasionally notes blood on toiler paper with hard stool. has RT proctitis.   2/5/19...Feels well, no pains. has hot flushes frequently, daily. Has some sweats with them at night. Appetite is good, no weight loss. Urine flow is minimal thru penis, nocturia does not occur. Most urine form PCNs, last catheter change January 2019, by IR at [facility]. Occ minor blood in the urine, when the catheter is loose. No back pains, no bone pains. Occ fatigue.   5/7/19...Had a calcium of 11.1 last visit. Called him and told to stop calcium and vit D. Repeat 3 weeks later was normal. Continues on Lupron and Casodex. Feels well. Hot flushes every day. He has some fatigue, unchanged. Appetite is good, no weight loss. Urine is minimal from penis, has bilateral PCNs. No bone pains. No edema.   8/8/19...Feels "all right", as a URTI. No pains noted. Hot flushes multiple time a day. Fatigue is present, mild. Urine flow is 'good', PCN bilateral, most flow form the right. Some flow thru penis, voids 1-2 times a day. NO blood, no dysuria. Due for PCN change next week. Appetite is good, no weight change.  10/29/19...Local failure prostate cancer, on CAB. Feels well. No pains., Urine flow is good. Has bilateral PCNs. Most of urine goes to PCNs. Occ small amount of blood in the bag. Voids 2 times a day. Hot flushes, daily, with sweats on occasion. has fatigue at times. Appetite is good, no weight loss. NO leg edema. No bleeding from the RT proctitis recently  1/28/20...Stopped bicalutamide after last visit. Last seen 3 months ago. PSA on 1/7 was 0.27, thus continued to rise somewhat. NO bone pains. Hot flushes daily, occ sweats. Appetite is good, no weight loss. No fatigue of note. Urine flow is good, but most flow is into the PCNs. Last change of PCNs was December after he had some leaking on one side. No hematuria, no dysuria. No incontinence.   5/6/20...Verbal permission granted for telephone services by patient, Surya Cervantes, on 5/6/20 at 1:35 PM.   Fells well. No pains noted. Nephrostomy tubes in place, replaced last week. Appetite is good, no weight loss. Hot flushes daily. He has chronic fatigue complaints. No leg edema noted. Urine through penis is minimal, only when tubes become clogged. No blood in the urine. No nausea, no vomiting. Has diarrhea at times, once every 2-3 weeks. No back pains noted. No fevers, no chills., No cough, no sputum.   6/17/20...Verbal permission for telephone contact was granted by the patient, Surya Cervantes, on June 17, at 4 PM. Feels "all right". NO pains. Hot flushes, daily, occ minor sweats. Fatigue is present, rated mild, occasional naps. Appetite is good, no weight loss. No edema. Urine flow is minimal from penis, has bilateral PCNs. No blood. No incontinence. No recent infections. No cough NO CHEN. No F/C. Helps in care of his mother, watches TV, not very active. Jeana from [place].   7/29/20...Verbal permission for telephone contact was granted by the patient, Surya Cervantes, on July 29, 2020 at 3:10 PM.  Started tawanda/pred on June 16, 2020. Feels  "pretty good". He was having diarrhea prior and he no longer had diarrhea.   No pains at this time. No edema. No headaches. Checks BP on occasion.  BPs have been "normal", he will check often. Urine flow "about the same",. Most urine comes from PCNs. No nocturia, no  hematuria, no dysuria. Appetite is normal, thinks he may have lost a few pounds. No cough, No CHEN. NO F/C. Hot flushes occur daily. No change. Mild fatigue.  8/26/20...On abiraterone and prednisone since June, PSA dropped significantly. taking meds correctly. Feels 'all right", no pains. Fatigue is present, as before, "not terrible", takes naps. Slightly more fatigue than before. Appetite is good, but lost weight. eats with his mother, she eats less and so does he. No nausea, no vomiting. No headaches, no edema of note. Most of urine comes from PCNs, Urine from penis is minimal, does not get up at night. No blood, no dysuria. No incontinence. No blood in the PCN bags.  Has leg cramps daily, particularly in the AM. Dr Angulo administers Lupron, due for Rx on 9/1/20.   9/22/20...He is seen in the office today in follow-up. He's been on abiraterone and prednisone since June. He had elevated transaminases on September 3 and his abiraterone was subsequently held. He will have repeat blood work done today after today's visit. He states that he feels "all right." His appetite is stated to be "all right." His weight has been stable. He denies dyspepsia, nausea or vomiting. He denies constipation or diarrheal stools. There is no cough or shortness of breath. He denies skin rashes or pruritus. There are no complaints of pains at this time. Fatigue is present but it does not impair his daily activities. He takes occasional naps during the day. He denies arthralgias or myalgias. There are no headaches or dizziness. He has bilateral percutaneous nephrostomy tubes draining yellow urine. There is no blood in the percutaneous nephrostomy bags. Most of the urine comes from the percutaneous nephrostomy tubes with minimal amount of urine coming out through the penis. There is no nocturia. There is no hematuria or dysuria. There is no incontinence. There is no edema in the extremities. He has occasional hot flashes but felt it has improved these past few days. He denies fevers or chills. He infrequently checks his blood pressure at home. He would get blood pressure readings ranging from 150s-160s systolic over 80s-90s diastolic. He remains independent in his activities of daily living.   10/27/20...Verbal permission for telephone services granted by the patient,  Surya Cervantes on October 27, 2020 at 3:48 PM.  Stared abiraterone in June. Feels "pretty good". NO increased edema. No headaches. Hot flushes are less. Appetite is good, no weight loss. Has PCNs, has urine form the penis at times. NO blood in the urine. No dysuria. No nocturia. No incontinence. No bone pains noted. Fatigue  RTs, takes a nap at times. Cares for his elderly mother at home.   11/25/20...Verbal permission for telephone services granted by the patient,  Surya Cervantes on November 25, 2020 at 1:50 PM On 1/2 dose tawanda due to transaminases. No pains, feels "all right". Some fatigue. Urine flow form penis in minimal, has bilateral PCNs. Occasional small amount of blood in the right PCN tube. Appetite is good, no weight loss. No cough, no CHEN. Hot flushes daily. No headaches, no edema. BP monitored at home, 151/89, pulse 95.   12/23/20...Verbal permission for telephone services granted by the patient,  Surya Cervantes on December 23, 2020 at  1:32 PM.  Feels well. No pains noted. Urine flow RTS. Has PCNs, minimal urine via penis on occasion. No blood in urine or tubes of note. Appetite is good, no weight loss. NO edema of the legs. NO headaches, no dizziness. No cough, no CHNE. No F/C. Hot flushes occur daily, less often. Due Lupron next month from Dr Angulo. Mild fatigue, take a nap. Independent in ADLs, cares for his mother. BP at home 138/90, pulse 71. Left arm 147/92  1/28/21...Verbal permission for telephone services granted by the patient,  Surya Cervantes on January 28, 2021 at 315 PM.  Surya reports that he has no pains.  There is no significant urine within his blood.  In the interim, he had to have the left percutaneous nephrostomy tube changed as there was pus present.  They had some difficulties apparently.  The tube was clogged and he was unable to change it over a guidewire.  He was not treated with any antibiotics.  He does have some urine from the penis at times.  There is no fevers or chills.  His appetite is good and there is no weight loss.  He reports that his weight is 190 pounds today.  He says his blood pressure was 146/92 in the left arm and 145/85 on the right arm.  The pulse rate was 75.  He reports no edema or headaches.  There are some hot flushes.  2/25/21...Verbal permission for telephone services granted by the patient,  Surya Cervantes on 2/25/21 at 3:10 PM Feels "all right". NO pains. He was having issues with PCNs clogged, had to have removal and reinsertion. It was the right side this week, done on Tuesday. Told to flush it daily rather than every 3 days. Appetite is good, no weight loss, no edema. No cough, no CHEN. BP is monitored, right side 139/84 today, left 134/82, pulse 79. No headaches, no significant fatigue, does nap on occasion. NO leg weakness. No N/V/D/C. Minimal urine from penis. There was more flow when the dysfunction occurred. NO blood in the urine, no dysuria.   4/7/21...  10/26/21....Last evaluation was 4/7/21, by telephone. Completed antibiotics. Was in rehab for 6 weeks. Strength is good, no pains. Appetite is good, gained some weight. urine flow is good, no incontinence, very little urine from penis, mostly from the tubes. No headaches, no dizziness, no balance issues, No falls. Hot flushes occur. Lives with mother, helps her. No edema at this time.   From neurosurgery, this summary of events....68 yo Male w/ PMHx of HTN, prostate CA (on chemo, s/p radiation), b/l nephrostomy tubes, presented  to [facility] ED on 6/14/21 with weakness and confusion. In ED foulurine in b/l nephrostomy bags noted, tachycardic, and hypotensive. s/p IVF and levo drip for septic shock, developed fluid overload with pitting edema and decreased  EF. Imaging of the spine concerning for discitis and osteomyelitis c-spine with T1 inflammation/signal changes.   Hospital coursed remarkable for BC + Klebsiella and strep anguinous. Started on vanco/zosyn 6/14. As per ID switched to ceftriaxone 2 g on 6/17. Hospitalization c/b ASHLEY on CKD with right hydronephrosis. IR consulted- no intervention, both nephrostomy tubes flushing adequately. Nephrology  consulted, started on stress dose steroids/hydrocortisone 50 mg q6 and Midodrine. CTAP showed Sludge in gallbladder.  Patient  underwent C6-7 corpectomy and posterior fusion C4-T2 on 6/29/21. Hospital course c/b tachycardia, fever with low O2 sats. Chest CT negative for  PE, lower extremity Dopplers negative. s/p PICC, discharged to inpatient rehab on 7/12/21 and then was discharged home after completion of rehab.   Last Eligard was August with Dr Angulo, 30 mg dose.   11/30/21...on tawanda/pred since June 2020. Feels  "all right". Has hot flushes and fatigue. Does not prevent activities. He falls asleep easily if he sits in a chair. Independent in ADLs, cares for his mother. No infections recently, No F/C. No recent adventures with with his PCNs. No blood in urine, occ urine via penis. Appetite is good, gained 2.2 kilos. Edema decreased. No chest pain/pressure. No cough/ CHEN. No headaches, no dizziness. No N/V/D/C.   1/11/22....telehealth today. Feels  "all right". No pains of note. t flushes daily. Has some fatigue as well. No edema noted. NO cough/CHEN No chest pain/pressure. No F/C. Appetite is good, no weight loss. Weighs 176. Checks BP at home, 142/98 RP 72 today, L arm, 142/91. Takes terazosin at night. Sees PCP in March. Needs to call PCP for tighter BP control. NO headaches, no dizziness. No F/C. No hematuria, has bilateral PCNS, has some small amount of urine from the penis. Had change of PCNs last week. Independent in ADLs.   2/8/22 - telehealth visit today. Continues on abiraterone 500mg daily + prednisone. Reports his BP today was 143/94 with HR of 74. Overall feeling "alright". Moderate fatigue, takes naps during the day. Occasional hot flashes. Appetite is good, weight today is 180lbs, he attributes recent weight gain to eating better after being discharged from the hospital. Mild ptosis of left eyelid comes and goes and has reportedly been stable for his "whole life". Bilat percutaneous nephrostomy tubes in place, no hematuria. Ongoing mild BLE edema is reportedly stable. Ongoing intermittent arthritis pains of left knee and right shoulder are stable. Denies fever, chills, headache, dizziness, balance issues, eye pain/problems, mucositis/odynophagia, chest pain, palpitations, SOB, cough, nausea/vomiting, diarrhea/constipation, abdominal pain, hematuria, rash/pruritus, neuropathy, bleeding, weakness, anxiety/depression.  3/17/22...tawanda/pred started mid June 2020. PSA was 11.6 at start. Feels "all right". No pains. No major issues with the PCNs, having a change done next week. Appetite is good, no weight loss. No cough, No CHEN. Some leg edema. No chest pain/pressure. Urine from penis is relatively little. No dysuria, no blood. has calcium oxalate excretion, which causes his tubes to be clogged. Notes some bruising. NO headaches, no dizziness, no balance issues. No F/C. No N/V/C, some minor diarrheal stools at times. Takes Imodium as as needed. Fatigue is present at times, has hot flushes occur multiple times a day.   4/20/22 - continues on abiraterone (500mg daily) + prednisone since June 2020 for mCRPC. Overall feeling "alright", ongoing mild fatigue is stable. Intermittent hot flashes are tolerable. Mild night sweats during the summer when it gets warmer. Ongoing mildly productive cough which he's had for "a long time". Occasional diarrhea that doesn't last more than a day. Bilateral percutaneous nephrostomy tubes in place, passing minimal urine from his penis. Occasional hematuria especially from right nephrostomy bag, clear today. Ongoing mild BLE edema. Ongoing chronic left knee swelling and pain 2/2 arthritis. Ongoing right shoulder pain 2/2 arthritis as well. Denies fever, chills, headache, dizziness, balance issues, eye pain/problems, mucositis/odynophagia, chest pain, palpitations, SOB, nausea/vomiting, constipation, abdominal pain, rash/pruritus, bleeding, muscle pain/weakness  5/24/22 - continues on abiraterone (500mg daily) + prednisone since June 2020 for mCRPC. BP today is 164/95, asymptomatic. BP at home has been 150s/80-90s. On occasion the DBP has been >100. Overall feeling "alright", ongoing fatigue is stable. Ongoing intermittent hot flashes. Appetite is good. Vision is changing in his left eye, he has an appt with [facility] this Thurs. Bilateral nephrostomy tubes in place, occasional hematuria at times resolves after 2-3 days. Ongoing trace edema of BLEs. Intermittent pruritus around nephrostomy tube dressings. Denies fever, chills, night sweats, headache, dizziness, balance issues, mucositis/odynophagia, chest pain, palpitations, SOB, cough, nausea/vomiting, diarrhea/constipation, abdominal pain, dysuria, incontinence,  rash, neuropathy, bleeding, muscle or joint pain/weakness.   6/21/22 - continues on abiraterone, half dose + prednisone since June 2020 for mCRPC. PSA 11.6 at start of treatment in June 2020, manjinder PSA at o.o5 August 2021, slow rise, recent PSA in May 2022 was 0.17.  feels  "all right". No pains noted. Has bilateral PCNs, changed last week. No infections, no F/C. Some fatigue, always, no worse. Appetite is good, no weight loss. NO headaches, no dizziness, no balance issues. Some edema noted, as before. No chest pain/pressure. NO bone pains. No N/V/D/C. Small amounts of urine via the penis. Hot flushes daily, multiple times. No cough, no CHEN.   7/28/22 - continues on abiraterone 500mg + prednisone since June 2020 for mCRPC. Overall feeling "alright", ongoing mild fatigue for which he occasionally takes a nap once a day. Ongoing hot flashes are tolerable. Appetite is good. Occasional cough. Bilateral percutaneous nephrostomy tubes in place passing most of the urine through the bags but still passing minimal urine via penis, no nocturia. Trace edema of BLEs. Notes intermittent cramping of hands which may be arthritis as it improves with movement. Has ongoing intermittent right buttock pain radiating down to the knee, present for the past year, pain is worse with sitting for a long time, improves with changing positions, max pain is 3/10 not requiring medication. Denies fever, chills, night sweats, headache, dizziness, balance issues, eye pain/problems, mucositis/odynophagia, chest pain, palpitations, SOB, nausea/vomiting, diarrhea/constipation, abdominal pain, dysuria, hematuria, incontinence, rash/pruritus, bleeding, weakness.   10/11/22 - continues on abiraterone 500mg daily + prednisone since June 2020 for mCRPC. Neurosurgical notes reviewed...."S/P cervical spinal fusion, 70 year old male 15 months pos op C6-7 corpectomy and posterior fusion C4-T2 on 6/29/21 with Dr. Tiffanie Martinez". Now seeing Dr Resendiz. Overall, feels "all right". No pains noted. urine flow is "fine", no incontinence, most urine still out PCNs, some from the penis. No blood, no dysuria. Hot flushes daily.  Some fatigue, RTS. Appetite is good, no weight loss. No headaches, dizziness, no balance issues, No paresthesias. Occ cough, sputum is white. No CHEN. Tubes to be changed next week. No N/V/D/C.  Some edema , improved. No chest pain/pressure  11/9/22 - continues on abiraterone 500mg daily + prednisone since June 2020 for mCRPC which he is tolerating well. Pt reports feeling generally well. No new complaints. Bilateral nephrostomy tubes in place, denies hematuria . Ongoing trace edema of BLEs. Intermittent pruritus around nephrostomy tube dressings. Denies fever, chills, night sweats, headache, dizziness, balance issues, mucositis/odynophagia, chest pain, palpitations, SOB, cough, nausea/vomiting, diarrhea/constipation, abdominal pain, dysuria, incontinence, rash, neuropathy, bleeding, muscle or joint pain/weakness. Appetite reported as good. No changes in medications  12/7/22 - continues on abiraterone 500mg daily + prednisone since June 2020 for mCRPC.  gets his Eligard 45 tomorrow form Dr Angulo. feels "all right". No pains noted except for arthrtic complaints. Appetite is okay< dropped a few pounds. usual edema, no change. No headaches, NO dizziness. Most of the urine comes from PCNs, changed every 6 weeks due to prior infections. NO fevers, no chills. Hot flushes occur, 1-2 times a day. Notes a bit more urine thru penis lately. Occ cough, no CHEN. No chest pain/pressure/palpitations. NO N/V/D/C. Mild fatigue. Independent in ADLs.   1/4/23 - continues on abiraterone 500mg daily + prednisone since June 2020 for mCRPC. Overall feeling "alright", ongoing fatigue is stable. Ongoing hot flashes are tolerable. Appetite is "ok". Ongoing occasional productive cough is stable. Bilateral perc nephrostomy tubes in place, continues to pass some urine through the penis, no nocturia. Denies fever, chills, night sweats,headache, dizziness, balance issues, eye pain/problems, mucositis/odynophagia, chest pain, palpitations, SOB, nausea/vomiting, diarrhea/constipation, abdominal pain, dysuria, hematuria, incontinence, LE edema, rash/pruritus, bleeding, muscle or joint pain/weakness.   2/13/23..... continues on abiraterone 500mg daily + prednisone since June 2020 for mCRPC. treated with cephalexin and developed rash, red skin, pruritus. Chart labeled as allergy. Hospitalization was brief. sudden onset of symptoms, Hd diarrheal stools as well. Feels  "pretty good", except for residual pruritus. No pains noted.  Has tube change set for next week. Appetite has retuned. No N/V. Diarrhea resolved, NO taste alteration. No headaches, no dizziness. had some edema. resolved. No chest pain/pressure/palpations.  has his usual fatigue and hot flushes. Cough, chronic, with clear sputum. No CHEN/SOB. No fevers of chills since discharge  Hospital Course:  Discharge Date	01-Feb-2023  Admission Date	29-Jan-2023 17:52  Reason for Admission	Syncope  Hospital Course	  71 yo M w/ HTN, gout, prostate cancer s/p b/l nephrostomy tubes, on abiraterone, admitted w/ sepsis 2/2 UTI, improved on antibiotics.  Sepsis secondary to UTI.  was febrile, tachycardic in the ED, met criteria for sepsis on admission  - U/A w/ +nitrite, large LE, prior cultures with klebsiella and E. coli  - current Ucx >3 organisms suggestive of collection contamination  - received IV zosyn (1/29/23-2/1/23),  Bcx negative, discharge on oral cephalexin to complete 10 days for complicated UTI (end date 2/7/23)  - sepsis resolved (currently afebrile, without leukocytosis, tachycardia resolved)  - IR consulted nephrostomy tubes in position and draining well, no indication for exchange at this time.   Gastroenteritis.   had reported nausea/vomiting/diarrhea after eating fried rice w/ beef  - s/p IV fluids, now resolved.   Syncope.   reports syncopal episode likely 2/2 dehydration from gastroenteritis  - s/p IV fluids, EKG sinus rhythm, troponin indeterminate but no increased  - reports symptoms resolved, ambulating around unit independently (advised call don't fall).   3/21/23..... continues on abiraterone 500mg daily + prednisone since June 2020 for mCRPC. treated with cephalexin and developed rash, red skin, pruritus. Chart labeled as allergy. Overall, he feels "all right". No pains of note. Appetite is good, no weight loss. No edema. No chest pain/pressure/palpitations. some cough, no CHEN. Hot flushes daily. Fatigue is present.  Does yoga, runs in place. No HA, no dizziness, no falls. Independent in ADLs. no fevers, no chills. Minimal urine from penis, no dysuria. PCNs are changed every 6 weeks, no blood in tubes. No N/V/D/C.   4/17/23.....  on abiraterone 500mg and prednisone since June 2020 for mCRPC. Prior transaminitis led to decrease in dose. "doing okay". Minor discomfort on left side near the PCN. Has change every 6 weeks at this time. Appetite is good, no weight loss. No N/V/D/C. Has some urine form the penis, noted if PCNs pinch. No blood in the urine, no hematuria. Hot flushes as before, about 3 times a day.  fatigue RTS. No cough, no CHEN, No fevers, no chills. No edema, no chest pains,pressure/palpitations  Exercises every AM.   5/16/23....continues on abiraterone 500mg and prednisone since June 2020 for mCRPC. Prior transaminitis led to decrease in dose. Eligard next month with Dr Angulo. Overall, feels "all right". No pains noted. Appetite is good, no weight loss. No N/V/D/C. Urine from penis on occasion, empties bladder before bed. No hematuria, no dysuria. No fevers, no chills. PCNs were changed last week. No headaches, no dizziness, no balance issues.  Exercises daily, walking and running. Occ cough, no CHEN. No chest pain/pressure/palpitations. Hot flushes remains. No fevers, no chills  6/12/23.... on abiraterone 500mg and prednisone since June 2020 for mCRPC. Prior transaminitis led to decrease in dose. Mara with Dr Angulo. Feels "well". NO pains, except for radicular pain in right thigh, present for about 6 weeks, constant. uses a topical, icy-hot. Does not awaken him, better when he is lying down, aggravated by ambulation. Appetite is normal. No weight loss. NO fevers, no chills. NO fatigue. No headaches, no dizziness, no paresthesias. No cane. No falls. No cough, no CHEN. NO N/V/D/C. Urine from PCNs. Some from the penis, no hematuria, no dysuria. No nocturia. Hot flushes daily. No edema. NO chest pain/pressure/palpitations. No back pains. Independent in ADLs  7/18/23 - continues on abiraterone + prednisone (500mg daily d/t transaminitis) for mCRPC since June 2020. Ongoing fatigue after periods activity, naps once a day. Ongoing hot flashes are tolerable. Appetite is good. Occasional cough. Bilateral perc nephrostomy tubes in place, scant blood if the tubing moves around but not persistent. Ongoing pain in right buttock radiating down posterior thigh/leg, no pain with lying down, pain is worse with sitting for a prolonged time and improves with walking, max pain is 8/10. Not taking any pain medication as the pain eases up when he gets up and walks around.   8/16/23 - continues on abiraterone + prednisone (500mg daily d/t transaminitis) for mCRPC since June 2020. Overall feeling "alright", ongoing fatigue is stable. Ongoing frequent hot flashes, tolerable. Appetite is "alright". Ongoing occasional cough. Perc nephrostomy tubes exchanged 2wks ago, occasional transient hematuria which he attributes to accidentally pulling on the tubes. Ongoing stable intermittent right buttock pain radiating down posterior thigh down to the calf, improves with movement and worse with sitting for a long time, max pain is 5/10. Using a topical cream called "Australian Dream" which has been helpful.   9/20/23 - continues on abiraterone + prednisone (500mg daily d/t transaminitis) for mCRPC since June 2020. last PCN change was last week. On a 6 week change protocol. Feels "not bad", chronic leg pains, form right buttock, down the leg. Passing urine via penis, very small amounts. No blood, no dysuria. No nocturia. No incontinence, voids about 2 times a day. Hot flushes, every day, no sweats. Fatigue is present, naps, feels refreshed afterwards. Does yoga, stretching, walking, lifts some weights, daily basis. Appetite is good, no weight Kenji Occ cough, no CHEN. No edema, no chest pain/pressure.   10/31/23 - continues on abiraterone + prednisone (500mg daily d/t transaminitis) for mCRPC since June 2020. Overall feeling "alright", ongoing fatigue is stable. Occasional hot flashes. Appetite is "alright". Perc nephrostomy tubes in place, passing minimal urine through his penis. Ongoing pain in right buttock that radiates down the posterior leg, worse in the morning when first waking up and worse with sitting for a prolonged period, improves/resolves with activity. Denies fever, chills, night sweats, headache, dizziness, balance issues, chest pain, palpitations, SOB, cough, nausea/vomiting, diarrhea/constipation, abdominal pain, dysuria, hematuria, incontinence, LE edema, bleeding.   12/5/23 - continues on abiraterone + prednisone (500mg daily d/t transaminitis) for mCRPC since June 2020. Overall feeling well, some fatigue but remains active and exercising regularly. Ongoing hot flashes are tolerable. Appetite is good. Bilateral percutaneous nephrostomy tubes in place, no hematuria. Ongoing right buttock pain that radiates down the posterior leg, worse in the morning and improves with activity, max pain is 4-5/10, declines referral to PM&R. Denies fever, chills, night sweats, headache, dizziness, balance issues, chest pain, palpitations, SOB, cough, nausea/vomiting, diarrhea/constipation, abdominal pain, LE edema, bleeding.  1/2/24 - on abiraterone + prednisone since June 2020, discontinued 12/26/23 for POD seen on 12/21/23 bone scan. Feeling well, no significant fatigue. Remains active and doing exercises daily. Occasional hot flashes are tolerable. Bilateral percutaneous nephrostomy tubes in place, no hematuria. Ongoing right buttock pain that radiates down the posterior leg, worse in the morning and improves with activity, max pain is 7-8/10, pain is stable. Denies fever, chills, night sweats, headache, dizziness, balance issues, chest pain, palpitations, SOB, cough, nausea/vomiting, diarrhea/constipation, abdominal pain, LE edema, bleeding.   2/14/24 - Xtandi started early Jan 2024 for mCRPC. Over the past week he's been very fatigued. Ongoing hot flashes. Appetite is decreased and not eating as much as he used to, weight is down 11lbs over the past 6wks. Stable cough since before start of Xtandi. Percutaneous nephrostomy tubes in place, no hematuria. Notes some pains in the hands, knees, and ankles which he attributes to arthritis. Ongoing pain in right buttock radiating down posterior leg, pain is worse in the AM when first waking up and improves with activity/walking, pain waxes and wanes, max pain is 7/10 but not taking any medication for this. Denies fever, chills, night sweats, headache, dizziness, balance issues, chest pain, palpitations, SOB, nausea/vomiting, diarrhea/constipation, abdominal pain, LE edema, bleeding.   3/14/24 - continues on Xtandi since early Jan 2024 for mCRPC. Overall feeling ok, fatigue is a little improved which he believes is because his BP hasn't been as low as it was in the past. BP at home has been 120's/70-80's in the mornings. Ongoing hot flashes are not as intense. Appetite is decreased, weight is down 7lbs over the past month, but overall down 18lbs over 2 months. Urine from bilateral percutaneous nephrostomy tubes is "good", no hematuria. Ongoing pain in right buttock down the posterior leg, pain is worse with sitting for a prolonged period, improves with getting up and moving around, not interfering with his sleep, not needing any medication for pain. Denies fever, chills, night sweats, headache, dizziness, balance issues, chest pain, palpitations, SOB, cough, nausea/vomiting, diarrhea/constipation, abdominal pain, LE edema, bleeding.  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 NO AAW benefit. Started tawanda/pred mid June 2020, discontinued 12/26/23 for disease progression. Xtandi started early Jan 2024, decreased to 80mg daily in May 2024 d/t weight loss. Switched to docetazel in 12/2024 due to PSA progression. [de-identified] : 4/16/24 - continues on Xtandi since early Jan 2024 for mCRPC. Excess lambda light chains on last blood work, no M-spike. Feels well. no Pains. Has to some fatigue, naps during the day. Appetite is "good", No N/V/D/C. No cough, no CHEN. NO chest pain/pressure. No headaches, no dizziness. No balance issues, minimal, walks with a cane. No falls. Urine  flos via tubes, no recent infections. NO blood in urine. Some minor urine amounts from penis. No nocturia, no dysuria. Hot flushes occur, less than before, 1-2 times a day. No edema noted. Independent in ADLs.  CDX done, no targets  5/15/24 - continues on Xtandi since early Jan 2024 for mCRPC. Feeling "alright". Ongoing fatigue, naps 1-2x/day. Occasional hot flashes. Decreased appetite, not eating as much as before, drinking 1-2 Ensure per day, weight is down 5lbs over the past month. Bilateral percutaneous nephrostomy tubes in place, no hematuria. Ongoing arthritic pains of the shoulders and hands at times. Denies fever, chills, night sweats, headache, dizziness, balance issues, chest pain, palpitations, SOB, cough, nausea/vomiting, diarrhea/constipation, abdominal pain, LE edema, bleeding.   6/12/24 - continues on Xtandi since early Jan 2024 for mCRPC, decreased to 80mg daily as of May 2024 for weight loss. Ongoing fatigue is stable. Ongoing hot flashes. Appetite is "a little better". Urine from nephrostomy tubes is clear yellow, no hematuria. Denies fever, chills, night sweats, headache, dizziness, balance issues, chest pain, palpitations, SOB, cough, nausea/vomiting, diarrhea/constipation, abdominal pain, LE edema, bleeding, muscle or joint pain/weakness.  7/11/24 - on Xtandi since early Jan 2024 for mCRPC, decreased to 80mg daily as of May 2024 for weight loss. Patient accompanied by brother, reports to be feeling well, appetite slowly improving, continues to have nephrostomy tubes in places, last changed 23 weeks ago draining clear yellow urine. Denies fever, chills, headaches, chest pain, sob, abdominal pain/back pain.   8/14/24 - on Xtandi since early Jan 2024 for mCRPC, decreased to 80mg daily as of May 2024 for weight loss. Overall feeling well, ongoing fatigue is stable. Ongoing hot flashes.  Appetite is stable, weight is up a couple lbs since last visit. Diarrhea yesterday "all day" with >6-7 episodes of soft/loose stool, he took PRN Imodium yesterday, no more stool today, unsure if it was r/t something he ate. Urine from nephrostomy tubes is "good", no hematuria. Denies fever, chills, night sweats, headache, dizziness, chest pain, palpitations, SOB, cough, nausea/vomiting, constipation, abdominal pain, LE edema, bleeding, muscle or joint pain/weakness.  9/17/24 - on Xtandi since early Jan 2024 for mCRPC, decreased to 80mg daily as of May 2024 for weight loss. Feeling "alright", stable fatigue. Ongoing hot flashes are tolerable. Appetite is stable. Having intermittent diarrhea approx every other week with approx 3 episodes when it occurs but resolves with PRN Imodium 1-2 tabs, having normal BMs in between, unsure if diarrhea is r/t eating spinach. Bilateral nephrostomy tubes in place. Denies fever, chills, night sweats, headache, dizziness, chest pain, palpitations, SOB, cough, nausea/vomiting, constipation, abdominal pain, hematuria, LE edema, bleeding, muscle or joint pain/weakness.  10/16/24 - on Xtandi since early Jan 2024 for mCRPC, decreased to 80mg daily as of May 2024 for weight loss. Ongoing fatigue is stable. Ongoing hot flashes are tolerable. Appetite is "alright". Occasional diarrhea is improved, only 2 episodes of diarrhea over the past month. Bilateral percutaneous nephrostomy tubes in place, still passes some urine through his penis. Ongoing intermittent arthritic pains of the shoulders and knees are stable since before cancer diagnosis. Denies fever, chills, night sweats, headache, dizziness, chest pain, palpitations, SOB, cough, nausea/vomiting, constipation, abdominal pain, dysuria, hematuria, incontinence, LE edema, bleeding.   11/14/24: presents for follow up and management of metastatic CRPC on ADT + reduced dose enzalutamide in setting of weight loss and fatigue. His PSA has been noted to be rising over the last several visits from a manjinder of 8.13 to 15.3 at last visit on 10/16/24.   12/2/24: He presents for follow up and cycle 1 of docetaxel 75mg/m2 after his PSA was noted to rise further from 15.3 to 21.4. He has stopped enzalutamide.  He denies any new significant complaints since last visit.   12/23/24: He presents for follow up and cycle 2 of docetaxel 75mg/m2. He notes transient diarrhea and increased fatigue after cycle 1. Also notes alopecia and increased blood pressure when he takes steroid premedication. Denies fever, chills, night sweats, bony pain.  1/13/25:  He presents for follow up and cycle 3 of docetaxel 75mg/m2. He denies any significant change in symptoms. He has intermittent leaking from nephrostomy sites, increased on days of tx.  Notes continued fatigue. Also notes alopecia and increased blood pressure when he takes steroid premedication. Denies fever, chills, night sweats, bony pain.  2/3/25:  He presents for follow up and cycle 4 of docetaxel. He denies any significant change in symptoms.  Notes continued fatigue. Also notes alopecia and increased blood pressure when he takes steroid premedication. Denies fever, chills, night sweats, bony pain.   2/27/25: He presents for follow up and cycle 5 of docetaxel...now dose reduced to 60mg/m2 2/2 cytopenias and fatigue.  He denies any significant change in symptoms.  Notes continued fatigue. BP has been stable. Denies fever, chills, night sweats, bony pain. Last imaging in October. Will repeat after C6. PSA continues to trend downward.   3/20/25: He presents for follow-up and cycle 6 of docetaxel. Does not appreciate a symptomatic difference between the prior dose and his reduced dose he received last cycle. Endorses hot flashes. Denies pain. Nephrostomy tubes functional. Reports occasional blood from R tube. CBC with improvement in Hb since dose reduction. Has been losing weight although endorses good appetite and is "always thirsty".

## 2025-03-21 NOTE — ASSESSMENT
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 He started abiraterone + prednisone in June 2020, dose was reduced to 500mg d/t transaminitis. Abiraterone discontinued late December 2023 for disease progression. Xtandi started Jan 2024.  He experienced transient benefit but appears to have progressing disease at this time as manifested by steadily rising PSA since 6/2024 and new lesions noted on CT imaging performed 10/30/24.   We discussed these results and that he will need an alternative therapy to control his disease. Standard of care options include taxane chemotherapy and Pluvicto. Given further increase in PSA to 21.4, we discussed the potential risks and benefits associated with docetaxel. He was amenable and signed informed consent.   He is tolerating treatment well to date. He is noted to have worsening anemia. To reduce likelihood of significant anemia, we reduced the dose of docetaxel to 60mg/m2. Has not been able to tell if symptoms are improved after dose reduction, but Hb is improved on CBC.    mCRPC: POD on tawanda/pred ->enzalutamide.  Now on docetaxel. Dose reduced to 60 mg/m2 since C4 2/2 fatigue/cytopenias - Feb 2024 [facility] CDx was negative for any actionable mutations, MSI high not detected, TMB 8 muts/Mb, ctDNA <1%.  -Continue Eligard q6mo injection with urology, given 12/12/24, next scheduled for 6/2025 -Proceed with cycle 6 docetaxel, dose reduced to 60mg/m2 -Repeat imaging. Orders placed today. -trend PSA  Bones: - He is off Ca + D supplementation d/t hypercalcemia.  - Monthly Xgeva inj started 5/15/24, continue  Cytopenia/anemia -Likely treatment related. No bleeding. Monitor hgb.  -Docetaxel, dose reduced to 60mg/m2 Hb 10.0 today, up from 9.5 last cycle   Instructed to contact our office with any new/worsening symptoms. Pt and family member educated regarding plan of care, all questions/concerns addressed to the best of my abilities and their apparent satisfaction.  RTC 3 weeks to see [facility] [place]   [AdvancecareDate] : 1/2/24 [FreeTextEntry2] : Health care proxy form provided to pt 12/5/23, he still has at home. Instructed once again to bring the completed form to his next office visit.

## 2025-03-21 NOTE — PHYSICAL EXAM
[Restricted in physically strenuous activity but ambulatory and able to carry out work of a light or sedentary nature] : Status 1- Restricted in physically strenuous activity but ambulatory and able to carry out work of a light or sedentary nature, e.g., light house work, office work [Normal] : affect appropriate [de-identified] : (+) alopecia [de-identified] : anicteric  [de-identified] : no LE edema

## 2025-03-21 NOTE — PHYSICAL EXAM
[Restricted in physically strenuous activity but ambulatory and able to carry out work of a light or sedentary nature] : Status 1- Restricted in physically strenuous activity but ambulatory and able to carry out work of a light or sedentary nature, e.g., light house work, office work [Normal] : affect appropriate [de-identified] : (+) alopecia [de-identified] : anicteric  [de-identified] : no LE edema

## 2025-03-21 NOTE — HISTORY OF PRESENT ILLNESS
[Disease: _____________________] : Disease: [unfilled] [T: ___] : T[unfilled] [N: ___] : N[unfilled] [M: ___] : M[unfilled] [ECOG Performance Status: 1 - Restricted in physically strenuous activity but ambulatory and able to carry out work of a light or sedentary nature] : Performance Status: 1 - Restricted in physically strenuous activity but ambulatory and able to carry out work of a light or sedentary nature, e.g., light house work, office work [de-identified] : This 66-year-old male was first seen in consultation on July 20, 2016. In August of 2013 his PSA was 4.3. In September 30, 2013 it was 4.9. He underwent a biopsy in March 5, 2014 revealing Ade 7 (3+4) disease. He underwent external beam radiation therapy for a total dose of 8100 cGy in 45 fractions from May 25 and total July 31 of 2014. He subsequently experienced PSA failure and brachytherapy was considered. A CT scan was performed as part of that evaluation revealing a filling defect in the left ureter. In March 2016, he presented to the emergency room with a creatinine of 13.7 and potassium of 8.9. Bilateral nephrostomy tubes were placed. He was found to have GI bleeding and a colonoscopy revealed evidence of radiation proctitis. He has been on Lupron. 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 His initial staging was stage II, [id number].    The original pathology was reviewed prior to radiation. The left base revealed adenocarcinoma the prostate, Ade score 7 (3+4) involving 2 out of 2 cores, 40% of each core, with perineural invasion. The left mid zone revealed adenocarcinoma the prostate, Ade score 7 and (4+3) involving 50% of each of 2 cores, with perineural invasion noted. The left apex had 3 of 3 cores involved. One core was Ade 7 (3+4) involving 100% of the core. The other 2 cores had a Ade 6 (3+3) in less than 5% of 2 cores. Perineural invasion was identified as well.  Posttreatment biopsy revealed the presence of adenocarcinoma within the prostate gland. The PSA was 11 at the time of the biopsy.  Feels well at initial consultation. Started Lupron in January 2016 before the renal failure/obstruction. No pains. Fatigue at times. Appetite good, stable weight. Lost weight with illness in March, typical weight prior was 210, left hospital at 170. Weight stable. He was transfused multiple times. Some urine through penis recently. Has bilateral percutaneous nephrostomies. No blood, dysuria, no incontinence. Nocturia x 2-3 at this time. Right sided nephrostomy still has output, small amount in left bag. Due for change of tubes in September. Had radiation proctitis and has laser treatment. Sees GI in follow-up next month.   10/18/16...Had a colonoscopy, showed radiation colitis. No further rectal blood noted. Urine flow mostly from PCNs, some from penis. No dysuria, some  hematuria noted at times. Appetite is normal. No weight loss. No fatigue. Hot flushes occur daily. .  4/18/17...last scans 9/26.  Saw PCP and had PSA done, shows slight increase over manjinder. he was given ferrous sulfate to take for anemia. No pains. Urine flow is mostly thru PCNs. Still has occasional blood in the urine, noted in the bag. Has less volume form the left side.  Hot flushes occur a few a day, 15-20 minutes, occ sweats. Appetite is good, no weight loss. Some fatigue noted. No edema. Occ blood with stools, saw Dr Grossman in September, due for follow up. has not had major bleeding recently.   8/1/17...Surya states that he is feeling good, his urine flow is strong, slight increase, no pains anywhere, he gets hot flashes a "few  day, they are coming." He has some minor fatigue, He has percutaneous nephrostomies, He follows with Dr. Dick, no obvious hematuria, no breast tenderness.  11/14/17...Received Lupron from Dr Dick a few weeks ago. PSA was 0.02 on 8/1/17. He gets hot flashes a couple times a day, He has b/l nephrostomies, is able to pass urine through his penis. He denies hematuria, dysuria He states appetite is good. He gets some fatigue, He denies any pains.  4/17/18...Received Lupron from Dr Dick Jan 23, 2018. Not seen since November 17, due to illness. 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 Feels well. No pains noted. Some fatigue. Appetite is good. Reports that he has diarrheal for 2-3 months, not daily. He had loose stools this AM, took Imodium. Diarrhea occurs 1-2 days a week, the n stops. Some flatus, no cramps. No blood in the stool. Urine flow is via nephrostomy, minor amount thru penis, no dysuria, no incontinence. No blood in urine. Hot flushes most days.    Minor fatigue. Appetite normal.   7/17/18...On CAB. Feels "okay". No pains. Has bilateral nephrostomy tubes, due for change in August. No recent infections, occasionally passes urine via penis, no blood, no dysuria. Still has hot flushes, daily, more in the winter. Some fatigue, no physical impairment. Appetite is good. Weight stable. Diarrhea persists, on and off, not daily. Watery at times. takes Imodium on occasion. Occ small amount of blood with hard stools, secondary to radiation proctitis.   10/23/18...Feels "okay". No pains. Urine flow is minimal, most comes out of the nephrostomy tubes, nocturia -none. No blood, no dysuria. No incontinence. Hot flushes occur, day and night. Appetite is good, no weight. No dyspepsia, no nausea, no vomiting. Diarrheal stools occur episodically, every 2-3 weeks. No blood in the stool. Usually 1-2 BMs per day. Occasionally notes blood on toiler paper with hard stool. has RT proctitis.   2/5/19...Feels well, no pains. has hot flushes frequently, daily. Has some sweats with them at night. Appetite is good, no weight loss. Urine flow is minimal thru penis, nocturia does not occur. Most urine form PCNs, last catheter change January 2019, by IR at [facility]. Occ minor blood in the urine, when the catheter is loose. No back pains, no bone pains. Occ fatigue.   5/7/19...Had a calcium of 11.1 last visit. Called him and told to stop calcium and vit D. Repeat 3 weeks later was normal. Continues on Lupron and Casodex. Feels well. Hot flushes every day. He has some fatigue, unchanged. Appetite is good, no weight loss. Urine is minimal from penis, has bilateral PCNs. No bone pains. No edema.   8/8/19...Feels "all right", as a URTI. No pains noted. Hot flushes multiple time a day. Fatigue is present, mild. Urine flow is 'good', PCN bilateral, most flow form the right. Some flow thru penis, voids 1-2 times a day. NO blood, no dysuria. Due for PCN change next week. Appetite is good, no weight change.  10/29/19...Local failure prostate cancer, on CAB. Feels well. No pains., Urine flow is good. Has bilateral PCNs. Most of urine goes to PCNs. Occ small amount of blood in the bag. Voids 2 times a day. Hot flushes, daily, with sweats on occasion. has fatigue at times. Appetite is good, no weight loss. NO leg edema. No bleeding from the RT proctitis recently  1/28/20...Stopped bicalutamide after last visit. Last seen 3 months ago. PSA on 1/7 was 0.27, thus continued to rise somewhat. NO bone pains. Hot flushes daily, occ sweats. Appetite is good, no weight loss. No fatigue of note. Urine flow is good, but most flow is into the PCNs. Last change of PCNs was December after he had some leaking on one side. No hematuria, no dysuria. No incontinence.   5/6/20...Verbal permission granted for telephone services by patient, Surya Cervantes, on 5/6/20 at 1:35 PM.   Fells well. No pains noted. Nephrostomy tubes in place, replaced last week. Appetite is good, no weight loss. Hot flushes daily. He has chronic fatigue complaints. No leg edema noted. Urine through penis is minimal, only when tubes become clogged. No blood in the urine. No nausea, no vomiting. Has diarrhea at times, once every 2-3 weeks. No back pains noted. No fevers, no chills., No cough, no sputum.   6/17/20...Verbal permission for telephone contact was granted by the patient, Surya Cervantes, on June 17, at 4 PM. Feels "all right". NO pains. Hot flushes, daily, occ minor sweats. Fatigue is present, rated mild, occasional naps. Appetite is good, no weight loss. No edema. Urine flow is minimal from penis, has bilateral PCNs. No blood. No incontinence. No recent infections. No cough NO CHEN. No F/C. Helps in care of his mother, watches TV, not very active. Jeana from [email or web address].   7/29/20...Verbal permission for telephone contact was granted by the patient, Surya Cervantes, on July 29, 2020 at 3:10 PM.  Started tawanda/pred on June 16, 2020. Feels  "pretty good". He was having diarrhea prior and he no longer had diarrhea.   No pains at this time. No edema. No headaches. Checks BP on occasion.  BPs have been "normal", he will check often. Urine flow "about the same",. Most urine comes from PCNs. No nocturia, no  hematuria, no dysuria. Appetite is normal, thinks he may have lost a few pounds. No cough, No CHEN. NO F/C. Hot flushes occur daily. No change. Mild fatigue.  8/26/20...On abiraterone and prednisone since June, PSA dropped significantly. taking meds correctly. Feels 'all right", no pains. Fatigue is present, as before, "not terrible", takes naps. Slightly more fatigue than before. Appetite is good, but lost weight. eats with his mother, she eats less and so does he. No nausea, no vomiting. No headaches, no edema of note. Most of urine comes from PCNs, Urine from penis is minimal, does not get up at night. No blood, no dysuria. No incontinence. No blood in the PCN bags.  Has leg cramps daily, particularly in the AM. Dr Angulo administers Lupron, due for Rx on 9/1/20.   9/22/20...He is seen in the office today in follow-up. He's been on abiraterone and prednisone since June. He had elevated transaminases on September 3 and his abiraterone was subsequently held. He will have repeat blood work done today after today's visit. He states that he feels "all right." His appetite is stated to be "all right." His weight has been stable. He denies dyspepsia, nausea or vomiting. He denies constipation or diarrheal stools. There is no cough or shortness of breath. He denies skin rashes or pruritus. There are no complaints of pains at this time. Fatigue is present but it does not impair his daily activities. He takes occasional naps during the day. He denies arthralgias or myalgias. There are no headaches or dizziness. He has bilateral percutaneous nephrostomy tubes draining yellow urine. There is no blood in the percutaneous nephrostomy bags. Most of the urine comes from the percutaneous nephrostomy tubes with minimal amount of urine coming out through the penis. There is no nocturia. There is no hematuria or dysuria. There is no incontinence. There is no edema in the extremities. He has occasional hot flashes but felt it has improved these past few days. He denies fevers or chills. He infrequently checks his blood pressure at home. He would get blood pressure readings ranging from 150s-160s systolic over 80s-90s diastolic. He remains independent in his activities of daily living.   10/27/20...Verbal permission for telephone services granted by the patient,  Surya Cervantes on October 27, 2020 at 3:48 PM.  Stared abiraterone in June. Feels "pretty good". NO increased edema. No headaches. Hot flushes are less. Appetite is good, no weight loss. Has PCNs, has urine form the penis at times. NO blood in the urine. No dysuria. No nocturia. No incontinence. No bone pains noted. Fatigue  RTs, takes a nap at times. Cares for his elderly mother at home.   11/25/20...Verbal permission for telephone services granted by the patient,  Surya Cervantes on November 25, 2020 at 1:50 PM On 1/2 dose tawanda due to transaminases. No pains, feels "all right". Some fatigue. Urine flow form penis in minimal, has bilateral PCNs. Occasional small amount of blood in the right PCN tube. Appetite is good, no weight loss. No cough, no CHEN. Hot flushes daily. No headaches, no edema. BP monitored at home, 151/89, pulse 95.   12/23/20...Verbal permission for telephone services granted by the patient,  Surya Cervantes on December 23, 2020 at  1:32 PM.  Feels well. No pains noted. Urine flow RTS. Has PCNs, minimal urine via penis on occasion. No blood in urine or tubes of note. Appetite is good, no weight loss. NO edema of the legs. NO headaches, no dizziness. No cough, no CHEN. No F/C. Hot flushes occur daily, less often. Due Lupron next month from Dr Angulo. Mild fatigue, take a nap. Independent in ADLs, cares for his mother. BP at home 138/90, pulse 71. Left arm 147/92  1/28/21...Verbal permission for telephone services granted by the patient,  Surya Cervantes on January 28, 2021 at 315 PM.  Surya reports that he has no pains.  There is no significant urine within his blood.  In the interim, he had to have the left percutaneous nephrostomy tube changed as there was pus present.  They had some difficulties apparently.  The tube was clogged and he was unable to change it over a guidewire.  He was not treated with any antibiotics.  He does have some urine from the penis at times.  There is no fevers or chills.  His appetite is good and there is no weight loss.  He reports that his weight is 190 pounds today.  He says his blood pressure was 146/92 in the left arm and 145/85 on the right arm.  The pulse rate was 75.  He reports no edema or headaches.  There are some hot flushes.  2/25/21...Verbal permission for telephone services granted by the patient,  Surya Cervantes on 2/25/21 at 3:10 PM Feels "all right". NO pains. He was having issues with PCNs clogged, had to have removal and reinsertion. It was the right side this week, done on Tuesday. Told to flush it daily rather than every 3 days. Appetite is good, no weight loss, no edema. No cough, no CHEN. BP is monitored, right side 139/84 today, left 134/82, pulse 79. No headaches, no significant fatigue, does nap on occasion. NO leg weakness. No N/V/D/C. Minimal urine from penis. There was more flow when the dysfunction occurred. NO blood in the urine, no dysuria.   4/7/21...  10/26/21....Last evaluation was 4/7/21, by telephone. Completed antibiotics. Was in rehab for 6 weeks. Strength is good, no pains. Appetite is good, gained some weight. urine flow is good, no incontinence, very little urine from penis, mostly from the tubes. No headaches, no dizziness, no balance issues, No falls. Hot flushes occur. Lives with mother, helps her. No edema at this time.   From neurosurgery, this summary of events....70 yo Male w/ PMHx of HTN, prostate CA (on chemo, s/p radiation), b/l nephrostomy tubes, presented  to [facility] ED on 6/14/21 with weakness and confusion. In ED foulurine in b/l nephrostomy bags noted, tachycardic, and hypotensive. s/p IVF and levo drip for septic shock, developed fluid overload with pitting edema and decreased  EF. Imaging of the spine concerning for discitis and osteomyelitis c-spine with T1 inflammation/signal changes.   Hospital coursed remarkable for BC + Klebsiella and strep anguinous. Started on vanco/zosyn 6/14. As per ID switched to ceftriaxone 2 g on 6/17. Hospitalization c/b ASHLEY on CKD with right hydronephrosis. IR consulted- no intervention, both nephrostomy tubes flushing adequately. Nephrology  consulted, started on stress dose steroids/hydrocortisone 50 mg q6 and Midodrine. CTAP showed Sludge in gallbladder.  Patient  underwent C6-7 corpectomy and posterior fusion C4-T2 on 6/29/21. Hospital course c/b tachycardia, fever with low O2 sats. Chest CT negative for  PE, lower extremity Dopplers negative. s/p PICC, discharged to inpatient rehab on 7/12/21 and then was discharged home after completion of rehab.   Last Eligard was August with Dr Angulo, 30 mg dose.   11/30/21...on tawanda/pred since June 2020. Feels  "all right". Has hot flushes and fatigue. Does not prevent activities. He falls asleep easily if he sits in a chair. Independent in ADLs, cares for his mother. No infections recently, No F/C. No recent adventures with with his PCNs. No blood in urine, occ urine via penis. Appetite is good, gained 2.2 kilos. Edema decreased. No chest pain/pressure. No cough/ CHEN. No headaches, no dizziness. No N/V/D/C.   1/11/22....telehealth today. Feels  "all right". No pains of note. t flushes daily. Has some fatigue as well. No edema noted. NO cough/CHEN No chest pain/pressure. No F/C. Appetite is good, no weight loss. Weighs 176. Checks BP at home, 142/98 RP 72 today, L arm, 142/91. Takes terazosin at night. Sees PCP in March. Needs to call PCP for tighter BP control. NO headaches, no dizziness. No F/C. No hematuria, has bilateral PCNS, has some small amount of urine from the penis. Had change of PCNs last week. Independent in ADLs.   2/8/22 - telehealth visit today. Continues on abiraterone 500mg daily + prednisone. Reports his BP today was 143/94 with HR of 74. Overall feeling "alright". Moderate fatigue, takes naps during the day. Occasional hot flashes. Appetite is good, weight today is 180lbs, he attributes recent weight gain to eating better after being discharged from the hospital. Mild ptosis of left eyelid comes and goes and has reportedly been stable for his "whole life". Bilat percutaneous nephrostomy tubes in place, no hematuria. Ongoing mild BLE edema is reportedly stable. Ongoing intermittent arthritis pains of left knee and right shoulder are stable. Denies fever, chills, headache, dizziness, balance issues, eye pain/problems, mucositis/odynophagia, chest pain, palpitations, SOB, cough, nausea/vomiting, diarrhea/constipation, abdominal pain, hematuria, rash/pruritus, neuropathy, bleeding, weakness, anxiety/depression.  3/17/22...tawanda/pred started mid June 2020. PSA was 11.6 at start. Feels "all right". No pains. No major issues with the PCNs, having a change done next week. Appetite is good, no weight loss. No cough, No CHEN. Some leg edema. No chest pain/pressure. Urine from penis is relatively little. No dysuria, no blood. has calcium oxalate excretion, which causes his tubes to be clogged. Notes some bruising. NO headaches, no dizziness, no balance issues. No F/C. No N/V/C, some minor diarrheal stools at times. Takes Imodium as as needed. Fatigue is present at times, has hot flushes occur multiple times a day.   4/20/22 - continues on abiraterone (500mg daily) + prednisone since June 2020 for mCRPC. Overall feeling "alright", ongoing mild fatigue is stable. Intermittent hot flashes are tolerable. Mild night sweats during the summer when it gets warmer. Ongoing mildly productive cough which he's had for "a long time". Occasional diarrhea that doesn't last more than a day. Bilateral percutaneous nephrostomy tubes in place, passing minimal urine from his penis. Occasional hematuria especially from right nephrostomy bag, clear today. Ongoing mild BLE edema. Ongoing chronic left knee swelling and pain 2/2 arthritis. Ongoing right shoulder pain 2/2 arthritis as well. Denies fever, chills, headache, dizziness, balance issues, eye pain/problems, mucositis/odynophagia, chest pain, palpitations, SOB, nausea/vomiting, constipation, abdominal pain, rash/pruritus, bleeding, muscle pain/weakness  5/24/22 - continues on abiraterone (500mg daily) + prednisone since June 2020 for mCRPC. BP today is 164/95, asymptomatic. BP at home has been 150s/80-90s. On occasion the DBP has been >100. Overall feeling "alright", ongoing fatigue is stable. Ongoing intermittent hot flashes. Appetite is good. Vision is changing in his left eye, he has an appt with [place] this Thurs. Bilateral nephrostomy tubes in place, occasional hematuria at times resolves after 2-3 days. Ongoing trace edema of BLEs. Intermittent pruritus around nephrostomy tube dressings. Denies fever, chills, night sweats, headache, dizziness, balance issues, mucositis/odynophagia, chest pain, palpitations, SOB, cough, nausea/vomiting, diarrhea/constipation, abdominal pain, dysuria, incontinence,  rash, neuropathy, bleeding, muscle or joint pain/weakness.   6/21/22 - continues on abiraterone, half dose + prednisone since June 2020 for mCRPC. PSA 11.6 at start of treatment in June 2020, manjinder PSA at o.o5 August 2021, slow rise, recent PSA in May 2022 was 0.17.  feels  "all right". No pains noted. Has bilateral PCNs, changed last week. No infections, no F/C. Some fatigue, always, no worse. Appetite is good, no weight loss. NO headaches, no dizziness, no balance issues. Some edema noted, as before. No chest pain/pressure. NO bone pains. No N/V/D/C. Small amounts of urine via the penis. Hot flushes daily, multiple times. No cough, no CHEN.   7/28/22 - continues on abiraterone 500mg + prednisone since June 2020 for mCRPC. Overall feeling "alright", ongoing mild fatigue for which he occasionally takes a nap once a day. Ongoing hot flashes are tolerable. Appetite is good. Occasional cough. Bilateral percutaneous nephrostomy tubes in place passing most of the urine through the bags but still passing minimal urine via penis, no nocturia. Trace edema of BLEs. Notes intermittent cramping of hands which may be arthritis as it improves with movement. Has ongoing intermittent right buttock pain radiating down to the knee, present for the past year, pain is worse with sitting for a long time, improves with changing positions, max pain is 3/10 not requiring medication. Denies fever, chills, night sweats, headache, dizziness, balance issues, eye pain/problems, mucositis/odynophagia, chest pain, palpitations, SOB, nausea/vomiting, diarrhea/constipation, abdominal pain, dysuria, hematuria, incontinence, rash/pruritus, bleeding, weakness.   10/11/22 - continues on abiraterone 500mg daily + prednisone since June 2020 for mCRPC. Neurosurgical notes reviewed...."S/P cervical spinal fusion, 70 year old male 15 months pos op C6-7 corpectomy and posterior fusion C4-T2 on 6/29/21 with Dr. Tiffanie Martinez". Now seeing Dr Resendiz. Overall, feels "all right". No pains noted. urine flow is "fine", no incontinence, most urine still out PCNs, some from the penis. No blood, no dysuria. Hot flushes daily.  Some fatigue, RTS. Appetite is good, no weight loss. No headaches, dizziness, no balance issues, No paresthesias. Occ cough, sputum is white. No CHEN. Tubes to be changed next week. No N/V/D/C.  Some edema , improved. No chest pain/pressure  11/9/22 - continues on abiraterone 500mg daily + prednisone since June 2020 for mCRPC which he is tolerating well. Pt reports feeling generally well. No new complaints. Bilateral nephrostomy tubes in place, denies hematuria . Ongoing trace edema of BLEs. Intermittent pruritus around nephrostomy tube dressings. Denies fever, chills, night sweats, headache, dizziness, balance issues, mucositis/odynophagia, chest pain, palpitations, SOB, cough, nausea/vomiting, diarrhea/constipation, abdominal pain, dysuria, incontinence, rash, neuropathy, bleeding, muscle or joint pain/weakness. Appetite reported as good. No changes in medications  12/7/22 - continues on abiraterone 500mg daily + prednisone since June 2020 for mCRPC.  gets his Eligard 45 tomorrow form Dr Angulo. feels "all right". No pains noted except for arthrtic complaints. Appetite is okay< dropped a few pounds. usual edema, no change. No headaches, NO dizziness. Most of the urine comes from PCNs, changed every 6 weeks due to prior infections. NO fevers, no chills. Hot flushes occur, 1-2 times a day. Notes a bit more urine thru penis lately. Occ cough, no CHEN. No chest pain/pressure/palpitations. NO N/V/D/C. Mild fatigue. Independent in ADLs.   1/4/23 - continues on abiraterone 500mg daily + prednisone since June 2020 for mCRPC. Overall feeling "alright", ongoing fatigue is stable. Ongoing hot flashes are tolerable. Appetite is "ok". Ongoing occasional productive cough is stable. Bilateral perc nephrostomy tubes in place, continues to pass some urine through the penis, no nocturia. Denies fever, chills, night sweats,headache, dizziness, balance issues, eye pain/problems, mucositis/odynophagia, chest pain, palpitations, SOB, nausea/vomiting, diarrhea/constipation, abdominal pain, dysuria, hematuria, incontinence, LE edema, rash/pruritus, bleeding, muscle or joint pain/weakness.   2/13/23..... continues on abiraterone 500mg daily + prednisone since June 2020 for mCRPC. treated with cephalexin and developed rash, red skin, pruritus. Chart labeled as allergy. Hospitalization was brief. sudden onset of symptoms, Hd diarrheal stools as well. Feels  "pretty good", except for residual pruritus. No pains noted.  Has tube change set for next week. Appetite has retuned. No N/V. Diarrhea resolved, NO taste alteration. No headaches, no dizziness. had some edema. resolved. No chest pain/pressure/palpations.  has his usual fatigue and hot flushes. Cough, chronic, with clear sputum. No CHEN/SOB. No fevers of chills since discharge  Hospital Course:  Discharge Date	01-Feb-2023  Admission Date	29-Jan-2023 17:52  Reason for Admission	Syncope  Hospital Course	  71 yo M w/ HTN, gout, prostate cancer s/p b/l nephrostomy tubes, on abiraterone, admitted w/ sepsis 2/2 UTI, improved on antibiotics.  Sepsis secondary to UTI.  was febrile, tachycardic in the ED, met criteria for sepsis on admission  - U/A w/ +nitrite, large LE, prior cultures with klebsiella and E. coli  - current Ucx >3 organisms suggestive of collection contamination  - received IV zosyn (1/29/23-2/1/23),  Bcx negative, discharge on oral cephalexin to complete 10 days for complicated UTI (end date 2/7/23)  - sepsis resolved (currently afebrile, without leukocytosis, tachycardia resolved)  - IR consulted nephrostomy tubes in position and draining well, no indication for exchange at this time.   Gastroenteritis.   had reported nausea/vomiting/diarrhea after eating fried rice w/ beef  - s/p IV fluids, now resolved.   Syncope.   reports syncopal episode likely 2/2 dehydration from gastroenteritis  - s/p IV fluids, EKG sinus rhythm, troponin indeterminate but no increased  - reports symptoms resolved, ambulating around unit independently (advised call don't fall).   3/21/23..... continues on abiraterone 500mg daily + prednisone since June 2020 for mCRPC. treated with cephalexin and developed rash, red skin, pruritus. Chart labeled as allergy. Overall, he feels "all right". No pains of note. Appetite is good, no weight loss. No edema. No chest pain/pressure/palpitations. some cough, no CHEN. Hot flushes daily. Fatigue is present.  Does yoga, runs in place. No HA, no dizziness, no falls. Independent in ADLs. no fevers, no chills. Minimal urine from penis, no dysuria. PCNs are changed every 6 weeks, no blood in tubes. No N/V/D/C.   4/17/23.....  on abiraterone 500mg and prednisone since June 2020 for mCRPC. Prior transaminitis led to decrease in dose. "doing okay". Minor discomfort on left side near the PCN. Has change every 6 weeks at this time. Appetite is good, no weight loss. No N/V/D/C. Has some urine form the penis, noted if PCNs pinch. No blood in the urine, no hematuria. Hot flushes as before, about 3 times a day.  fatigue RTS. No cough, no CHEN, No fevers, no chills. No edema, no chest pains,pressure/palpitations  Exercises every AM.   5/16/23....continues on abiraterone 500mg and prednisone since June 2020 for mCRPC. Prior transaminitis led to decrease in dose. Eligard next month with Dr Angulo. Overall, feels "all right". No pains noted. Appetite is good, no weight loss. No N/V/D/C. Urine from penis on occasion, empties bladder before bed. No hematuria, no dysuria. No fevers, no chills. PCNs were changed last week. No headaches, no dizziness, no balance issues.  Exercises daily, walking and running. Occ cough, no CHEN. No chest pain/pressure/palpitations. Hot flushes remains. No fevers, no chills  6/12/23.... on abiraterone 500mg and prednisone since June 2020 for mCRPC. Prior transaminitis led to decrease in dose. Mara with Dr Angulo. Feels "well". NO pains, except for radicular pain in right thigh, present for about 6 weeks, constant. uses a topical, icy-hot. Does not awaken him, better when he is lying down, aggravated by ambulation. Appetite is normal. No weight loss. NO fevers, no chills. NO fatigue. No headaches, no dizziness, no paresthesias. No cane. No falls. No cough, no CHEN. NO N/V/D/C. Urine from PCNs. Some from the penis, no hematuria, no dysuria. No nocturia. Hot flushes daily. No edema. NO chest pain/pressure/palpitations. No back pains. Independent in ADLs  7/18/23 - continues on abiraterone + prednisone (500mg daily d/t transaminitis) for mCRPC since June 2020. Ongoing fatigue after periods activity, naps once a day. Ongoing hot flashes are tolerable. Appetite is good. Occasional cough. Bilateral perc nephrostomy tubes in place, scant blood if the tubing moves around but not persistent. Ongoing pain in right buttock radiating down posterior thigh/leg, no pain with lying down, pain is worse with sitting for a prolonged time and improves with walking, max pain is 8/10. Not taking any pain medication as the pain eases up when he gets up and walks around.   8/16/23 - continues on abiraterone + prednisone (500mg daily d/t transaminitis) for mCRPC since June 2020. Overall feeling "alright", ongoing fatigue is stable. Ongoing frequent hot flashes, tolerable. Appetite is "alright". Ongoing occasional cough. Perc nephrostomy tubes exchanged 2wks ago, occasional transient hematuria which he attributes to accidentally pulling on the tubes. Ongoing stable intermittent right buttock pain radiating down posterior thigh down to the calf, improves with movement and worse with sitting for a long time, max pain is 5/10. Using a topical cream called "[place] Dream" which has been helpful.   9/20/23 - continues on abiraterone + prednisone (500mg daily d/t transaminitis) for mCRPC since June 2020. last PCN change was last week. On a 6 week change protocol. Feels "not bad", chronic leg pains, form right buttock, down the leg. Passing urine via penis, very small amounts. No blood, no dysuria. No nocturia. No incontinence, voids about 2 times a day. Hot flushes, every day, no sweats. Fatigue is present, naps, feels refreshed afterwards. Does yoga, stretching, walking, lifts some weights, daily basis. Appetite is good, no weight Kenji Occ cough, no CHEN. No edema, no chest pain/pressure.   10/31/23 - continues on abiraterone + prednisone (500mg daily d/t transaminitis) for mCRPC since June 2020. Overall feeling "alright", ongoing fatigue is stable. Occasional hot flashes. Appetite is "alright". Perc nephrostomy tubes in place, passing minimal urine through his penis. Ongoing pain in right buttock that radiates down the posterior leg, worse in the morning when first waking up and worse with sitting for a prolonged period, improves/resolves with activity. Denies fever, chills, night sweats, headache, dizziness, balance issues, chest pain, palpitations, SOB, cough, nausea/vomiting, diarrhea/constipation, abdominal pain, dysuria, hematuria, incontinence, LE edema, bleeding.   12/5/23 - continues on abiraterone + prednisone (500mg daily d/t transaminitis) for mCRPC since June 2020. Overall feeling well, some fatigue but remains active and exercising regularly. Ongoing hot flashes are tolerable. Appetite is good. Bilateral percutaneous nephrostomy tubes in place, no hematuria. Ongoing right buttock pain that radiates down the posterior leg, worse in the morning and improves with activity, max pain is 4-5/10, declines referral to PM&R. Denies fever, chills, night sweats, headache, dizziness, balance issues, chest pain, palpitations, SOB, cough, nausea/vomiting, diarrhea/constipation, abdominal pain, LE edema, bleeding.  1/2/24 - on abiraterone + prednisone since June 2020, discontinued 12/26/23 for POD seen on 12/21/23 bone scan. Feeling well, no significant fatigue. Remains active and doing exercises daily. Occasional hot flashes are tolerable. Bilateral percutaneous nephrostomy tubes in place, no hematuria. Ongoing right buttock pain that radiates down the posterior leg, worse in the morning and improves with activity, max pain is 7-8/10, pain is stable. Denies fever, chills, night sweats, headache, dizziness, balance issues, chest pain, palpitations, SOB, cough, nausea/vomiting, diarrhea/constipation, abdominal pain, LE edema, bleeding.   2/14/24 - Xtandi started early Jan 2024 for mCRPC. Over the past week he's been very fatigued. Ongoing hot flashes. Appetite is decreased and not eating as much as he used to, weight is down 11lbs over the past 6wks. Stable cough since before start of Xtandi. Percutaneous nephrostomy tubes in place, no hematuria. Notes some pains in the hands, knees, and ankles which he attributes to arthritis. Ongoing pain in right buttock radiating down posterior leg, pain is worse in the AM when first waking up and improves with activity/walking, pain waxes and wanes, max pain is 7/10 but not taking any medication for this. Denies fever, chills, night sweats, headache, dizziness, balance issues, chest pain, palpitations, SOB, nausea/vomiting, diarrhea/constipation, abdominal pain, LE edema, bleeding.   3/14/24 - continues on Xtandi since early Jan 2024 for mCRPC. Overall feeling ok, fatigue is a little improved which he believes is because his BP hasn't been as low as it was in the past. BP at home has been 120's/70-80's in the mornings. Ongoing hot flashes are not as intense. Appetite is decreased, weight is down 7lbs over the past month, but overall down 18lbs over 2 months. Urine from bilateral percutaneous nephrostomy tubes is "good", no hematuria. Ongoing pain in right buttock down the posterior leg, pain is worse with sitting for a prolonged period, improves with getting up and moving around, not interfering with his sleep, not needing any medication for pain. Denies fever, chills, night sweats, headache, dizziness, balance issues, chest pain, palpitations, SOB, cough, nausea/vomiting, diarrhea/constipation, abdominal pain, LE edema, bleeding.  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 NO AAW benefit. Started tawanda/pred mid June 2020, discontinued 12/26/23 for disease progression. Xtandi started early Jan 2024, decreased to 80mg daily in May 2024 d/t weight loss. Switched to docetazel in 12/2024 due to PSA progression. [de-identified] : 4/16/24 - continues on Xtandi since early Jan 2024 for mCRPC. Excess lambda light chains on last blood work, no M-spike. Feels well. no Pains. Has to some fatigue, naps during the day. Appetite is "good", No N/V/D/C. No cough, no CHEN. NO chest pain/pressure. No headaches, no dizziness. No balance issues, minimal, walks with a cane. No falls. Urine  flos via tubes, no recent infections. NO blood in urine. Some minor urine amounts from penis. No nocturia, no dysuria. Hot flushes occur, less than before, 1-2 times a day. No edema noted. Independent in ADLs.  CDX done, no targets  5/15/24 - continues on Xtandi since early Jan 2024 for mCRPC. Feeling "alright". Ongoing fatigue, naps 1-2x/day. Occasional hot flashes. Decreased appetite, not eating as much as before, drinking 1-2 Ensure per day, weight is down 5lbs over the past month. Bilateral percutaneous nephrostomy tubes in place, no hematuria. Ongoing arthritic pains of the shoulders and hands at times. Denies fever, chills, night sweats, headache, dizziness, balance issues, chest pain, palpitations, SOB, cough, nausea/vomiting, diarrhea/constipation, abdominal pain, LE edema, bleeding.   6/12/24 - continues on Xtandi since early Jan 2024 for mCRPC, decreased to 80mg daily as of May 2024 for weight loss. Ongoing fatigue is stable. Ongoing hot flashes. Appetite is "a little better". Urine from nephrostomy tubes is clear yellow, no hematuria. Denies fever, chills, night sweats, headache, dizziness, balance issues, chest pain, palpitations, SOB, cough, nausea/vomiting, diarrhea/constipation, abdominal pain, LE edema, bleeding, muscle or joint pain/weakness.  7/11/24 - on Xtandi since early Jan 2024 for mCRPC, decreased to 80mg daily as of May 2024 for weight loss. Patient accompanied by brother, reports to be feeling well, appetite slowly improving, continues to have nephrostomy tubes in places, last changed 23 weeks ago draining clear yellow urine. Denies fever, chills, headaches, chest pain, sob, abdominal pain/back pain.   8/14/24 - on Xtandi since early Jan 2024 for mCRPC, decreased to 80mg daily as of May 2024 for weight loss. Overall feeling well, ongoing fatigue is stable. Ongoing hot flashes.  Appetite is stable, weight is up a couple lbs since last visit. Diarrhea yesterday "all day" with >6-7 episodes of soft/loose stool, he took PRN Imodium yesterday, no more stool today, unsure if it was r/t something he ate. Urine from nephrostomy tubes is "good", no hematuria. Denies fever, chills, night sweats, headache, dizziness, chest pain, palpitations, SOB, cough, nausea/vomiting, constipation, abdominal pain, LE edema, bleeding, muscle or joint pain/weakness.  9/17/24 - on Xtandi since early Jan 2024 for mCRPC, decreased to 80mg daily as of May 2024 for weight loss. Feeling "alright", stable fatigue. Ongoing hot flashes are tolerable. Appetite is stable. Having intermittent diarrhea approx every other week with approx 3 episodes when it occurs but resolves with PRN Imodium 1-2 tabs, having normal BMs in between, unsure if diarrhea is r/t eating spinach. Bilateral nephrostomy tubes in place. Denies fever, chills, night sweats, headache, dizziness, chest pain, palpitations, SOB, cough, nausea/vomiting, constipation, abdominal pain, hematuria, LE edema, bleeding, muscle or joint pain/weakness.  10/16/24 - on Xtandi since early Jan 2024 for mCRPC, decreased to 80mg daily as of May 2024 for weight loss. Ongoing fatigue is stable. Ongoing hot flashes are tolerable. Appetite is "alright". Occasional diarrhea is improved, only 2 episodes of diarrhea over the past month. Bilateral percutaneous nephrostomy tubes in place, still passes some urine through his penis. Ongoing intermittent arthritic pains of the shoulders and knees are stable since before cancer diagnosis. Denies fever, chills, night sweats, headache, dizziness, chest pain, palpitations, SOB, cough, nausea/vomiting, constipation, abdominal pain, dysuria, hematuria, incontinence, LE edema, bleeding.   11/14/24: presents for follow up and management of metastatic CRPC on ADT + reduced dose enzalutamide in setting of weight loss and fatigue. His PSA has been noted to be rising over the last several visits from a manjinder of 8.13 to 15.3 at last visit on 10/16/24.   12/2/24: He presents for follow up and cycle 1 of docetaxel 75mg/m2 after his PSA was noted to rise further from 15.3 to 21.4. He has stopped enzalutamide.  He denies any new significant complaints since last visit.   12/23/24: He presents for follow up and cycle 2 of docetaxel 75mg/m2. He notes transient diarrhea and increased fatigue after cycle 1. Also notes alopecia and increased blood pressure when he takes steroid premedication. Denies fever, chills, night sweats, bony pain.  1/13/25:  He presents for follow up and cycle 3 of docetaxel 75mg/m2. He denies any significant change in symptoms. He has intermittent leaking from nephrostomy sites, increased on days of tx.  Notes continued fatigue. Also notes alopecia and increased blood pressure when he takes steroid premedication. Denies fever, chills, night sweats, bony pain.  2/3/25:  He presents for follow up and cycle 4 of docetaxel. He denies any significant change in symptoms.  Notes continued fatigue. Also notes alopecia and increased blood pressure when he takes steroid premedication. Denies fever, chills, night sweats, bony pain.   2/27/25: He presents for follow up and cycle 5 of docetaxel...now dose reduced to 60mg/m2 2/2 cytopenias and fatigue.  He denies any significant change in symptoms.  Notes continued fatigue. BP has been stable. Denies fever, chills, night sweats, bony pain. Last imaging in October. Will repeat after C6. PSA continues to trend downward.   3/20/25: He presents for follow-up and cycle 6 of docetaxel. Does not appreciate a symptomatic difference between the prior dose and his reduced dose he received last cycle. Endorses hot flashes. Denies pain. Nephrostomy tubes functional. Reports occasional blood from R tube. CBC with improvement in Hb since dose reduction. Has been losing weight although endorses good appetite and is "always thirsty".

## 2025-03-21 NOTE — REVIEW OF SYSTEMS
[Fatigue] : fatigue [Joint Pain] : joint pain [Hot Flashes] : hot flashes [Fever] : no fever [Chills] : no chills [Night Sweats] : no night sweats [Chest Pain] : no chest pain [Palpitations] : no palpitations [Lower Ext Edema] : no lower extremity edema [Shortness Of Breath] : no shortness of breath [Cough] : no cough [Abdominal Pain] : no abdominal pain [Vomiting] : no vomiting [Constipation] : no constipation [Dysuria] : no dysuria [Incontinence] : no incontinence [Joint Stiffness] : no joint stiffness [Muscle Pain] : no muscle pain [Muscle Weakness] : no muscle weakness [Dizziness] : no dizziness [Easy Bleeding] : no tendency for easy bleeding [Easy Bruising] : no tendency for easy bruising [de-identified] : occasional [de-identified] : hot flashes

## 2025-04-10 NOTE — ASSESSMENT
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 He started abiraterone + prednisone in June 2020, dose was reduced to 500mg d/t transaminitis. Abiraterone discontinued late December 2023 for disease progression. Xtandi started Jan 2024.  He experienced transient benefit but appears to have progressing disease at this time as manifested by steadily rising PSA since 6/2024 and new lesions noted on CT imaging performed 10/30/24.   We discussed these results and that he will need an alternative therapy to control his disease. Standard of care options include taxane chemotherapy and Pluvicto. Given further increase in PSA to 21.4, we discussed the potential risks and benefits associated with docetaxel. He was amenable and signed informed consent.   He is tolerating treatment well to date. He is noted to have worsening anemia. To reduce likelihood of significant anemia, we reduced the dose of docetaxel to 60mg/m2. Has not been able to tell if symptoms are improved after dose reduction, but Hb is improved on CBC.    mCRPC: POD on tawanda/pred ->enzalutamide.  Now on docetaxel. Dose reduced to 60 mg/m2 since C4 2/2 fatigue/cytopenias - Feb 2024 [facility] CDx was negative for any actionable mutations, MSI high not detected, TMB 8 muts/Mb, ctDNA <1%.  -Continue Eligard q6mo injection with urology, given 12/12/24, next scheduled for 6/2025 -Proceed with cycle 7 docetaxel, dose reduced to 60mg/m2 -Pt instructed to schedule repeat imaging prior to next cycle. Orders in system -trend PSA. Confirmed checking today in treatment  -Concern for unintentional weight loss. Can have nutrition eval in treatment. Encouraged adding calories.  Bones: - He is off Ca + D supplementation d/t hypercalcemia.  - Monthly Xgeva inj started 5/15/24, continue  Cytopenia/anemia -Likely treatment related. No bleeding. Monitor hgb. HGB 9.6 today -Docetaxel, dose reduced to 60mg/m2 C4  HTN -Concern for sporadic dosing of Terazosin. Encouraged f/u with pmd.   Instructed to contact our office with any new/worsening symptoms. Pt and family member educated regarding plan of care, all questions/concerns addressed to the best of my abilities and their apparent satisfaction.  RTC 3 weeks    [AdvancecareDate] : 1/2/24 [FreeTextEntry2] : Health care proxy form provided to pt 12/5/23, he still has at home. Instructed once again to bring the completed form to his next office visit.

## 2025-04-10 NOTE — HISTORY OF PRESENT ILLNESS
[Disease: _____________________] : Disease: [unfilled] [T: ___] : T[unfilled] [N: ___] : N[unfilled] [M: ___] : M[unfilled] [ECOG Performance Status: 1 - Restricted in physically strenuous activity but ambulatory and able to carry out work of a light or sedentary nature] : Performance Status: 1 - Restricted in physically strenuous activity but ambulatory and able to carry out work of a light or sedentary nature, e.g., light house work, office work [de-identified] : This 66-year-old male was first seen in consultation on July 20, 2016. In August of 2013 his PSA was 4.3. In September 30, 2013 it was 4.9. He underwent a biopsy in March 5, 2014 revealing Ade 7 (3+4) disease. He underwent external beam radiation therapy for a total dose of 8100 cGy in 45 fractions from May 25 and total July 31 of 2014. He subsequently experienced PSA failure and brachytherapy was considered. A CT scan was performed as part of that evaluation revealing a filling defect in the left ureter. In March 2016, he presented to the emergency room with a creatinine of 13.7 and potassium of 8.9. Bilateral nephrostomy tubes were placed. He was found to have GI bleeding and a colonoscopy revealed evidence of radiation proctitis. He has been on Lupron. 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 His initial staging was stage II, [id number].    The original pathology was reviewed prior to radiation. The left base revealed adenocarcinoma the prostate, Ade score 7 (3+4) involving 2 out of 2 cores, 40% of each core, with perineural invasion. The left mid zone revealed adenocarcinoma the prostate, Ade score 7 and (4+3) involving 50% of each of 2 cores, with perineural invasion noted. The left apex had 3 of 3 cores involved. One core was Ade 7 (3+4) involving 100% of the core. The other 2 cores had a Ade 6 (3+3) in less than 5% of 2 cores. Perineural invasion was identified as well.  Posttreatment biopsy revealed the presence of adenocarcinoma within the prostate gland. The PSA was 11 at the time of the biopsy.  Feels well at initial consultation. Started Lupron in January 2016 before the renal failure/obstruction. No pains. Fatigue at times. Appetite good, stable weight. Lost weight with illness in March, typical weight prior was 210, left hospital at 170. Weight stable. He was transfused multiple times. Some urine through penis recently. Has bilateral percutaneous nephrostomies. No blood, dysuria, no incontinence. Nocturia x 2-3 at this time. Right sided nephrostomy still has output, small amount in left bag. Due for change of tubes in September. Had radiation proctitis and has laser treatment. Sees GI in follow-up next month.   10/18/16...Had a colonoscopy, showed radiation colitis. No further rectal blood noted. Urine flow mostly from PCNs, some from penis. No dysuria, some  hematuria noted at times. Appetite is normal. No weight loss. No fatigue. Hot flushes occur daily. .  4/18/17...last scans 9/26.  Saw PCP and had PSA done, shows slight increase over manjinder. he was given ferrous sulfate to take for anemia. No pains. Urine flow is mostly thru PCNs. Still has occasional blood in the urine, noted in the bag. Has less volume form the left side.  Hot flushes occur a few a day, 15-20 minutes, occ sweats. Appetite is good, no weight loss. Some fatigue noted. No edema. Occ blood with stools, saw Dr Grossman in September, due for follow up. has not had major bleeding recently.   8/1/17...Surya states that he is feeling good, his urine flow is strong, slight increase, no pains anywhere, he gets hot flashes a "few  day, they are coming." He has some minor fatigue, He has percutaneous nephrostomies, He follows with Dr. Dick, no obvious hematuria, no breast tenderness.  11/14/17...Received Lupron from Dr Dick a few weeks ago. PSA was 0.02 on 8/1/17. He gets hot flashes a couple times a day, He has b/l nephrostomies, is able to pass urine through his penis. He denies hematuria, dysuria He states appetite is good. He gets some fatigue, He denies any pains.  4/17/18...Received Lupron from Dr Dick Jan 23, 2018. Not seen since November 17, due to illness. 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 Feels well. No pains noted. Some fatigue. Appetite is good. Reports that he has diarrheal for 2-3 months, not daily. He had loose stools this AM, took Imodium. Diarrhea occurs 1-2 days a week, the n stops. Some flatus, no cramps. No blood in the stool. Urine flow is via nephrostomy, minor amount thru penis, no dysuria, no incontinence. No blood in urine. Hot flushes most days.    Minor fatigue. Appetite normal.   7/17/18...On CAB. Feels "okay". No pains. Has bilateral nephrostomy tubes, due for change in August. No recent infections, occasionally passes urine via penis, no blood, no dysuria. Still has hot flushes, daily, more in the winter. Some fatigue, no physical impairment. Appetite is good. Weight stable. Diarrhea persists, on and off, not daily. Watery at times. takes Imodium on occasion. Occ small amount of blood with hard stools, secondary to radiation proctitis.   10/23/18...Feels "okay". No pains. Urine flow is minimal, most comes out of the nephrostomy tubes, nocturia -none. No blood, no dysuria. No incontinence. Hot flushes occur, day and night. Appetite is good, no weight. No dyspepsia, no nausea, no vomiting. Diarrheal stools occur episodically, every 2-3 weeks. No blood in the stool. Usually 1-2 BMs per day. Occasionally notes blood on toiler paper with hard stool. has RT proctitis.   2/5/19...Feels well, no pains. has hot flushes frequently, daily. Has some sweats with them at night. Appetite is good, no weight loss. Urine flow is minimal thru penis, nocturia does not occur. Most urine form PCNs, last catheter change January 2019, by IR at [facility]. Occ minor blood in the urine, when the catheter is loose. No back pains, no bone pains. Occ fatigue.   5/7/19...Had a calcium of 11.1 last visit. Called him and told to stop calcium and vit D. Repeat 3 weeks later was normal. Continues on Lupron and Casodex. Feels well. Hot flushes every day. He has some fatigue, unchanged. Appetite is good, no weight loss. Urine is minimal from penis, has bilateral PCNs. No bone pains. No edema.   8/8/19...Feels "all right", as a URTI. No pains noted. Hot flushes multiple time a day. Fatigue is present, mild. Urine flow is 'good', PCN bilateral, most flow form the right. Some flow thru penis, voids 1-2 times a day. NO blood, no dysuria. Due for PCN change next week. Appetite is good, no weight change.  10/29/19...Local failure prostate cancer, on CAB. Feels well. No pains., Urine flow is good. Has bilateral PCNs. Most of urine goes to PCNs. Occ small amount of blood in the bag. Voids 2 times a day. Hot flushes, daily, with sweats on occasion. has fatigue at times. Appetite is good, no weight loss. NO leg edema. No bleeding from the RT proctitis recently  1/28/20...Stopped bicalutamide after last visit. Last seen 3 months ago. PSA on 1/7 was 0.27, thus continued to rise somewhat. NO bone pains. Hot flushes daily, occ sweats. Appetite is good, no weight loss. No fatigue of note. Urine flow is good, but most flow is into the PCNs. Last change of PCNs was December after he had some leaking on one side. No hematuria, no dysuria. No incontinence.   5/6/20...Verbal permission granted for telephone services by patient, Surya Cervantes, on 5/6/20 at 1:35 PM.   Fells well. No pains noted. Nephrostomy tubes in place, replaced last week. Appetite is good, no weight loss. Hot flushes daily. He has chronic fatigue complaints. No leg edema noted. Urine through penis is minimal, only when tubes become clogged. No blood in the urine. No nausea, no vomiting. Has diarrhea at times, once every 2-3 weeks. No back pains noted. No fevers, no chills., No cough, no sputum.   6/17/20...Verbal permission for telephone contact was granted by the patient, Surya Cervantes, on June 17, at 4 PM. Feels "all right". NO pains. Hot flushes, daily, occ minor sweats. Fatigue is present, rated mild, occasional naps. Appetite is good, no weight loss. No edema. Urine flow is minimal from penis, has bilateral PCNs. No blood. No incontinence. No recent infections. No cough NO CHEN. No F/C. Helps in care of his mother, watches TV, not very active. Jeana from [employer].   7/29/20...Verbal permission for telephone contact was granted by the patient, Surya Cervantes, on July 29, 2020 at 3:10 PM.  Started tawanda/pred on June 16, 2020. Feels  "pretty good". He was having diarrhea prior and he no longer had diarrhea.   No pains at this time. No edema. No headaches. Checks BP on occasion.  BPs have been "normal", he will check often. Urine flow "about the same",. Most urine comes from PCNs. No nocturia, no  hematuria, no dysuria. Appetite is normal, thinks he may have lost a few pounds. No cough, No CHEN. NO F/C. Hot flushes occur daily. No change. Mild fatigue.  8/26/20...On abiraterone and prednisone since June, PSA dropped significantly. taking meds correctly. Feels 'all right", no pains. Fatigue is present, as before, "not terrible", takes naps. Slightly more fatigue than before. Appetite is good, but lost weight. eats with his mother, she eats less and so does he. No nausea, no vomiting. No headaches, no edema of note. Most of urine comes from PCNs, Urine from penis is minimal, does not get up at night. No blood, no dysuria. No incontinence. No blood in the PCN bags.  Has leg cramps daily, particularly in the AM. Dr Angulo administers Lupron, due for Rx on 9/1/20.   9/22/20...He is seen in the office today in follow-up. He's been on abiraterone and prednisone since June. He had elevated transaminases on September 3 and his abiraterone was subsequently held. He will have repeat blood work done today after today's visit. He states that he feels "all right." His appetite is stated to be "all right." His weight has been stable. He denies dyspepsia, nausea or vomiting. He denies constipation or diarrheal stools. There is no cough or shortness of breath. He denies skin rashes or pruritus. There are no complaints of pains at this time. Fatigue is present but it does not impair his daily activities. He takes occasional naps during the day. He denies arthralgias or myalgias. There are no headaches or dizziness. He has bilateral percutaneous nephrostomy tubes draining yellow urine. There is no blood in the percutaneous nephrostomy bags. Most of the urine comes from the percutaneous nephrostomy tubes with minimal amount of urine coming out through the penis. There is no nocturia. There is no hematuria or dysuria. There is no incontinence. There is no edema in the extremities. He has occasional hot flashes but felt it has improved these past few days. He denies fevers or chills. He infrequently checks his blood pressure at home. He would get blood pressure readings ranging from 150s-160s systolic over 80s-90s diastolic. He remains independent in his activities of daily living.   10/27/20...Verbal permission for telephone services granted by the patient,  Surya Cervantes on October 27, 2020 at 3:48 PM.  Stared abiraterone in June. Feels "pretty good". NO increased edema. No headaches. Hot flushes are less. Appetite is good, no weight loss. Has PCNs, has urine form the penis at times. NO blood in the urine. No dysuria. No nocturia. No incontinence. No bone pains noted. Fatigue  RTs, takes a nap at times. Cares for his elderly mother at home.   11/25/20...Verbal permission for telephone services granted by the patient,  Surya Cervantes on November 25, 2020 at 1:50 PM On 1/2 dose tawanda due to transaminases. No pains, feels "all right". Some fatigue. Urine flow form penis in minimal, has bilateral PCNs. Occasional small amount of blood in the right PCN tube. Appetite is good, no weight loss. No cough, no CHEN. Hot flushes daily. No headaches, no edema. BP monitored at home, 151/89, pulse 95.   12/23/20...Verbal permission for telephone services granted by the patient,  Surya Cervantes on December 23, 2020 at  1:32 PM.  Feels well. No pains noted. Urine flow RTS. Has PCNs, minimal urine via penis on occasion. No blood in urine or tubes of note. Appetite is good, no weight loss. NO edema of the legs. NO headaches, no dizziness. No cough, no CHEN. No F/C. Hot flushes occur daily, less often. Due Lupron next month from Dr Angulo. Mild fatigue, take a nap. Independent in ADLs, cares for his mother. BP at home 138/90, pulse 71. Left arm 147/92  1/28/21...Verbal permission for telephone services granted by the patient,  Surya Cervantes on January 28, 2021 at 315 PM.  Surya reports that he has no pains.  There is no significant urine within his blood.  In the interim, he had to have the left percutaneous nephrostomy tube changed as there was pus present.  They had some difficulties apparently.  The tube was clogged and he was unable to change it over a guidewire.  He was not treated with any antibiotics.  He does have some urine from the penis at times.  There is no fevers or chills.  His appetite is good and there is no weight loss.  He reports that his weight is 190 pounds today.  He says his blood pressure was 146/92 in the left arm and 145/85 on the right arm.  The pulse rate was 75.  He reports no edema or headaches.  There are some hot flushes.  2/25/21...Verbal permission for telephone services granted by the patient,  Surya Cervantes on 2/25/21 at 3:10 PM Feels "all right". NO pains. He was having issues with PCNs clogged, had to have removal and reinsertion. It was the right side this week, done on Tuesday. Told to flush it daily rather than every 3 days. Appetite is good, no weight loss, no edema. No cough, no CHEN. BP is monitored, right side 139/84 today, left 134/82, pulse 79. No headaches, no significant fatigue, does nap on occasion. NO leg weakness. No N/V/D/C. Minimal urine from penis. There was more flow when the dysfunction occurred. NO blood in the urine, no dysuria.   4/7/21...  10/26/21....Last evaluation was 4/7/21, by telephone. Completed antibiotics. Was in rehab for 6 weeks. Strength is good, no pains. Appetite is good, gained some weight. urine flow is good, no incontinence, very little urine from penis, mostly from the tubes. No headaches, no dizziness, no balance issues, No falls. Hot flushes occur. Lives with mother, helps her. No edema at this time.   From neurosurgery, this summary of events....70 yo Male w/ PMHx of HTN, prostate CA (on chemo, s/p radiation), b/l nephrostomy tubes, presented  to [facility] ED on 6/14/21 with weakness and confusion. In ED foulurine in b/l nephrostomy bags noted, tachycardic, and hypotensive. s/p IVF and levo drip for septic shock, developed fluid overload with pitting edema and decreased  EF. Imaging of the spine concerning for discitis and osteomyelitis c-spine with T1 inflammation/signal changes.   Hospital coursed remarkable for BC + Klebsiella and strep anguinous. Started on vanco/zosyn 6/14. As per ID switched to ceftriaxone 2 g on 6/17. Hospitalization c/b ASHLEY on CKD with right hydronephrosis. IR consulted- no intervention, both nephrostomy tubes flushing adequately. Nephrology  consulted, started on stress dose steroids/hydrocortisone 50 mg q6 and Midodrine. CTAP showed Sludge in gallbladder.  Patient  underwent C6-7 corpectomy and posterior fusion C4-T2 on 6/29/21. Hospital course c/b tachycardia, fever with low O2 sats. Chest CT negative for  PE, lower extremity Dopplers negative. s/p PICC, discharged to inpatient rehab on 7/12/21 and then was discharged home after completion of rehab.   Last Eligard was August with Dr Angulo, 30 mg dose.   11/30/21...on tawadna/pred since June 2020. Feels  "all right". Has hot flushes and fatigue. Does not prevent activities. He falls asleep easily if he sits in a chair. Independent in ADLs, cares for his mother. No infections recently, No F/C. No recent adventures with with his PCNs. No blood in urine, occ urine via penis. Appetite is good, gained 2.2 kilos. Edema decreased. No chest pain/pressure. No cough/ CHEN. No headaches, no dizziness. No N/V/D/C.   1/11/22....telehealth today. Feels  "all right". No pains of note. t flushes daily. Has some fatigue as well. No edema noted. NO cough/CHEN No chest pain/pressure. No F/C. Appetite is good, no weight loss. Weighs 176. Checks BP at home, 142/98 RP 72 today, L arm, 142/91. Takes terazosin at night. Sees PCP in March. Needs to call PCP for tighter BP control. NO headaches, no dizziness. No F/C. No hematuria, has bilateral PCNS, has some small amount of urine from the penis. Had change of PCNs last week. Independent in ADLs.   2/8/22 - telehealth visit today. Continues on abiraterone 500mg daily + prednisone. Reports his BP today was 143/94 with HR of 74. Overall feeling "alright". Moderate fatigue, takes naps during the day. Occasional hot flashes. Appetite is good, weight today is 180lbs, he attributes recent weight gain to eating better after being discharged from the hospital. Mild ptosis of left eyelid comes and goes and has reportedly been stable for his "whole life". Bilat percutaneous nephrostomy tubes in place, no hematuria. Ongoing mild BLE edema is reportedly stable. Ongoing intermittent arthritis pains of left knee and right shoulder are stable. Denies fever, chills, headache, dizziness, balance issues, eye pain/problems, mucositis/odynophagia, chest pain, palpitations, SOB, cough, nausea/vomiting, diarrhea/constipation, abdominal pain, hematuria, rash/pruritus, neuropathy, bleeding, weakness, anxiety/depression.  3/17/22...tawanda/pred started mid June 2020. PSA was 11.6 at start. Feels "all right". No pains. No major issues with the PCNs, having a change done next week. Appetite is good, no weight loss. No cough, No CHEN. Some leg edema. No chest pain/pressure. Urine from penis is relatively little. No dysuria, no blood. has calcium oxalate excretion, which causes his tubes to be clogged. Notes some bruising. NO headaches, no dizziness, no balance issues. No F/C. No N/V/C, some minor diarrheal stools at times. Takes Imodium as as needed. Fatigue is present at times, has hot flushes occur multiple times a day.   4/20/22 - continues on abiraterone (500mg daily) + prednisone since June 2020 for mCRPC. Overall feeling "alright", ongoing mild fatigue is stable. Intermittent hot flashes are tolerable. Mild night sweats during the summer when it gets warmer. Ongoing mildly productive cough which he's had for "a long time". Occasional diarrhea that doesn't last more than a day. Bilateral percutaneous nephrostomy tubes in place, passing minimal urine from his penis. Occasional hematuria especially from right nephrostomy bag, clear today. Ongoing mild BLE edema. Ongoing chronic left knee swelling and pain 2/2 arthritis. Ongoing right shoulder pain 2/2 arthritis as well. Denies fever, chills, headache, dizziness, balance issues, eye pain/problems, mucositis/odynophagia, chest pain, palpitations, SOB, nausea/vomiting, constipation, abdominal pain, rash/pruritus, bleeding, muscle pain/weakness  5/24/22 - continues on abiraterone (500mg daily) + prednisone since June 2020 for mCRPC. BP today is 164/95, asymptomatic. BP at home has been 150s/80-90s. On occasion the DBP has been >100. Overall feeling "alright", ongoing fatigue is stable. Ongoing intermittent hot flashes. Appetite is good. Vision is changing in his left eye, he has an appt with Booster this Thurs. Bilateral nephrostomy tubes in place, occasional hematuria at times resolves after 2-3 days. Ongoing trace edema of BLEs. Intermittent pruritus around nephrostomy tube dressings. Denies fever, chills, night sweats, headache, dizziness, balance issues, mucositis/odynophagia, chest pain, palpitations, SOB, cough, nausea/vomiting, diarrhea/constipation, abdominal pain, dysuria, incontinence,  rash, neuropathy, bleeding, muscle or joint pain/weakness.   6/21/22 - continues on abiraterone, half dose + prednisone since June 2020 for mCRPC. PSA 11.6 at start of treatment in June 2020, manjinder PSA at o.o5 August 2021, slow rise, recent PSA in May 2022 was 0.17.  feels  "all right". No pains noted. Has bilateral PCNs, changed last week. No infections, no F/C. Some fatigue, always, no worse. Appetite is good, no weight loss. NO headaches, no dizziness, no balance issues. Some edema noted, as before. No chest pain/pressure. NO bone pains. No N/V/D/C. Small amounts of urine via the penis. Hot flushes daily, multiple times. No cough, no CHEN.   7/28/22 - continues on abiraterone 500mg + prednisone since June 2020 for mCRPC. Overall feeling "alright", ongoing mild fatigue for which he occasionally takes a nap once a day. Ongoing hot flashes are tolerable. Appetite is good. Occasional cough. Bilateral percutaneous nephrostomy tubes in place passing most of the urine through the bags but still passing minimal urine via penis, no nocturia. Trace edema of BLEs. Notes intermittent cramping of hands which may be arthritis as it improves with movement. Has ongoing intermittent right buttock pain radiating down to the knee, present for the past year, pain is worse with sitting for a long time, improves with changing positions, max pain is 3/10 not requiring medication. Denies fever, chills, night sweats, headache, dizziness, balance issues, eye pain/problems, mucositis/odynophagia, chest pain, palpitations, SOB, nausea/vomiting, diarrhea/constipation, abdominal pain, dysuria, hematuria, incontinence, rash/pruritus, bleeding, weakness.   10/11/22 - continues on abiraterone 500mg daily + prednisone since June 2020 for mCRPC. Neurosurgical notes reviewed...."S/P cervical spinal fusion, 70 year old male 15 months pos op C6-7 corpectomy and posterior fusion C4-T2 on 6/29/21 with Dr. Tiffanie Martinez". Now seeing Dr Resendiz. Overall, feels "all right". No pains noted. urine flow is "fine", no incontinence, most urine still out PCNs, some from the penis. No blood, no dysuria. Hot flushes daily.  Some fatigue, RTS. Appetite is good, no weight loss. No headaches, dizziness, no balance issues, No paresthesias. Occ cough, sputum is white. No CHEN. Tubes to be changed next week. No N/V/D/C.  Some edema , improved. No chest pain/pressure  11/9/22 - continues on abiraterone 500mg daily + prednisone since June 2020 for mCRPC which he is tolerating well. Pt reports feeling generally well. No new complaints. Bilateral nephrostomy tubes in place, denies hematuria . Ongoing trace edema of BLEs. Intermittent pruritus around nephrostomy tube dressings. Denies fever, chills, night sweats, headache, dizziness, balance issues, mucositis/odynophagia, chest pain, palpitations, SOB, cough, nausea/vomiting, diarrhea/constipation, abdominal pain, dysuria, incontinence, rash, neuropathy, bleeding, muscle or joint pain/weakness. Appetite reported as good. No changes in medications  12/7/22 - continues on abiraterone 500mg daily + prednisone since June 2020 for mCRPC.  gets his Eligard 45 tomorrow form Dr Angulo. feels "all right". No pains noted except for arthrtic complaints. Appetite is okay< dropped a few pounds. usual edema, no change. No headaches, NO dizziness. Most of the urine comes from PCNs, changed every 6 weeks due to prior infections. NO fevers, no chills. Hot flushes occur, 1-2 times a day. Notes a bit more urine thru penis lately. Occ cough, no CHEN. No chest pain/pressure/palpitations. NO N/V/D/C. Mild fatigue. Independent in ADLs.   1/4/23 - continues on abiraterone 500mg daily + prednisone since June 2020 for mCRPC. Overall feeling "alright", ongoing fatigue is stable. Ongoing hot flashes are tolerable. Appetite is "ok". Ongoing occasional productive cough is stable. Bilateral perc nephrostomy tubes in place, continues to pass some urine through the penis, no nocturia. Denies fever, chills, night sweats,headache, dizziness, balance issues, eye pain/problems, mucositis/odynophagia, chest pain, palpitations, SOB, nausea/vomiting, diarrhea/constipation, abdominal pain, dysuria, hematuria, incontinence, LE edema, rash/pruritus, bleeding, muscle or joint pain/weakness.   2/13/23..... continues on abiraterone 500mg daily + prednisone since June 2020 for mCRPC. treated with cephalexin and developed rash, red skin, pruritus. Chart labeled as allergy. Hospitalization was brief. sudden onset of symptoms, Hd diarrheal stools as well. Feels  "pretty good", except for residual pruritus. No pains noted.  Has tube change set for next week. Appetite has retuned. No N/V. Diarrhea resolved, NO taste alteration. No headaches, no dizziness. had some edema. resolved. No chest pain/pressure/palpations.  has his usual fatigue and hot flushes. Cough, chronic, with clear sputum. No CHEN/SOB. No fevers of chills since discharge  Hospital Course:  Discharge Date	01-Feb-2023  Admission Date	29-Jan-2023 17:52  Reason for Admission	Syncope  Hospital Course	  69 yo M w/ HTN, gout, prostate cancer s/p b/l nephrostomy tubes, on abiraterone, admitted w/ sepsis 2/2 UTI, improved on antibiotics.  Sepsis secondary to UTI.  was febrile, tachycardic in the ED, met criteria for sepsis on admission  - U/A w/ +nitrite, large LE, prior cultures with klebsiella and E. coli  - current Ucx >3 organisms suggestive of collection contamination  - received IV zosyn (1/29/23-2/1/23),  Bcx negative, discharge on oral cephalexin to complete 10 days for complicated UTI (end date 2/7/23)  - sepsis resolved (currently afebrile, without leukocytosis, tachycardia resolved)  - IR consulted nephrostomy tubes in position and draining well, no indication for exchange at this time.   Gastroenteritis.   had reported nausea/vomiting/diarrhea after eating fried rice w/ beef  - s/p IV fluids, now resolved.   Syncope.   reports syncopal episode likely 2/2 dehydration from gastroenteritis  - s/p IV fluids, EKG sinus rhythm, troponin indeterminate but no increased  - reports symptoms resolved, ambulating around unit independently (advised call don't fall).   3/21/23..... continues on abiraterone 500mg daily + prednisone since June 2020 for mCRPC. treated with cephalexin and developed rash, red skin, pruritus. Chart labeled as allergy. Overall, he feels "all right". No pains of note. Appetite is good, no weight loss. No edema. No chest pain/pressure/palpitations. some cough, no CHEN. Hot flushes daily. Fatigue is present.  Does yoga, runs in place. No HA, no dizziness, no falls. Independent in ADLs. no fevers, no chills. Minimal urine from penis, no dysuria. PCNs are changed every 6 weeks, no blood in tubes. No N/V/D/C.   4/17/23.....  on abiraterone 500mg and prednisone since June 2020 for mCRPC. Prior transaminitis led to decrease in dose. "doing okay". Minor discomfort on left side near the PCN. Has change every 6 weeks at this time. Appetite is good, no weight loss. No N/V/D/C. Has some urine form the penis, noted if PCNs pinch. No blood in the urine, no hematuria. Hot flushes as before, about 3 times a day.  fatigue RTS. No cough, no CHEN, No fevers, no chills. No edema, no chest pains,pressure/palpitations  Exercises every AM.   5/16/23....continues on abiraterone 500mg and prednisone since June 2020 for mCRPC. Prior transaminitis led to decrease in dose. Eligard next month with Dr Angulo. Overall, feels "all right". No pains noted. Appetite is good, no weight loss. No N/V/D/C. Urine from penis on occasion, empties bladder before bed. No hematuria, no dysuria. No fevers, no chills. PCNs were changed last week. No headaches, no dizziness, no balance issues.  Exercises daily, walking and running. Occ cough, no CHEN. No chest pain/pressure/palpitations. Hot flushes remains. No fevers, no chills  6/12/23.... on abiraterone 500mg and prednisone since June 2020 for mCRPC. Prior transaminitis led to decrease in dose. Mara with Dr Angulo. Feels "well". NO pains, except for radicular pain in right thigh, present for about 6 weeks, constant. uses a topical, icy-hot. Does not awaken him, better when he is lying down, aggravated by ambulation. Appetite is normal. No weight loss. NO fevers, no chills. NO fatigue. No headaches, no dizziness, no paresthesias. No cane. No falls. No cough, no CHEN. NO N/V/D/C. Urine from PCNs. Some from the penis, no hematuria, no dysuria. No nocturia. Hot flushes daily. No edema. NO chest pain/pressure/palpitations. No back pains. Independent in ADLs  7/18/23 - continues on abiraterone + prednisone (500mg daily d/t transaminitis) for mCRPC since June 2020. Ongoing fatigue after periods activity, naps once a day. Ongoing hot flashes are tolerable. Appetite is good. Occasional cough. Bilateral perc nephrostomy tubes in place, scant blood if the tubing moves around but not persistent. Ongoing pain in right buttock radiating down posterior thigh/leg, no pain with lying down, pain is worse with sitting for a prolonged time and improves with walking, max pain is 8/10. Not taking any pain medication as the pain eases up when he gets up and walks around.   8/16/23 - continues on abiraterone + prednisone (500mg daily d/t transaminitis) for mCRPC since June 2020. Overall feeling "alright", ongoing fatigue is stable. Ongoing frequent hot flashes, tolerable. Appetite is "alright". Ongoing occasional cough. Perc nephrostomy tubes exchanged 2wks ago, occasional transient hematuria which he attributes to accidentally pulling on the tubes. Ongoing stable intermittent right buttock pain radiating down posterior thigh down to the calf, improves with movement and worse with sitting for a long time, max pain is 5/10. Using a topical cream called "Australian Dream" which has been helpful.   9/20/23 - continues on abiraterone + prednisone (500mg daily d/t transaminitis) for mCRPC since June 2020. last PCN change was last week. On a 6 week change protocol. Feels "not bad", chronic leg pains, form right buttock, down the leg. Passing urine via penis, very small amounts. No blood, no dysuria. No nocturia. No incontinence, voids about 2 times a day. Hot flushes, every day, no sweats. Fatigue is present, naps, feels refreshed afterwards. Does yoga, stretching, walking, lifts some weights, daily basis. Appetite is good, no weight Kenji Occ cough, no CHEN. No edema, no chest pain/pressure.   10/31/23 - continues on abiraterone + prednisone (500mg daily d/t transaminitis) for mCRPC since June 2020. Overall feeling "alright", ongoing fatigue is stable. Occasional hot flashes. Appetite is "alright". Perc nephrostomy tubes in place, passing minimal urine through his penis. Ongoing pain in right buttock that radiates down the posterior leg, worse in the morning when first waking up and worse with sitting for a prolonged period, improves/resolves with activity. Denies fever, chills, night sweats, headache, dizziness, balance issues, chest pain, palpitations, SOB, cough, nausea/vomiting, diarrhea/constipation, abdominal pain, dysuria, hematuria, incontinence, LE edema, bleeding.   12/5/23 - continues on abiraterone + prednisone (500mg daily d/t transaminitis) for mCRPC since June 2020. Overall feeling well, some fatigue but remains active and exercising regularly. Ongoing hot flashes are tolerable. Appetite is good. Bilateral percutaneous nephrostomy tubes in place, no hematuria. Ongoing right buttock pain that radiates down the posterior leg, worse in the morning and improves with activity, max pain is 4-5/10, declines referral to PM&R. Denies fever, chills, night sweats, headache, dizziness, balance issues, chest pain, palpitations, SOB, cough, nausea/vomiting, diarrhea/constipation, abdominal pain, LE edema, bleeding.  1/2/24 - on abiraterone + prednisone since June 2020, discontinued 12/26/23 for POD seen on 12/21/23 bone scan. Feeling well, no significant fatigue. Remains active and doing exercises daily. Occasional hot flashes are tolerable. Bilateral percutaneous nephrostomy tubes in place, no hematuria. Ongoing right buttock pain that radiates down the posterior leg, worse in the morning and improves with activity, max pain is 7-8/10, pain is stable. Denies fever, chills, night sweats, headache, dizziness, balance issues, chest pain, palpitations, SOB, cough, nausea/vomiting, diarrhea/constipation, abdominal pain, LE edema, bleeding.   2/14/24 - Xtandi started early Jan 2024 for mCRPC. Over the past week he's been very fatigued. Ongoing hot flashes. Appetite is decreased and not eating as much as he used to, weight is down 11lbs over the past 6wks. Stable cough since before start of Xtandi. Percutaneous nephrostomy tubes in place, no hematuria. Notes some pains in the hands, knees, and ankles which he attributes to arthritis. Ongoing pain in right buttock radiating down posterior leg, pain is worse in the AM when first waking up and improves with activity/walking, pain waxes and wanes, max pain is 7/10 but not taking any medication for this. Denies fever, chills, night sweats, headache, dizziness, balance issues, chest pain, palpitations, SOB, nausea/vomiting, diarrhea/constipation, abdominal pain, LE edema, bleeding.   3/14/24 - continues on Xtandi since early Jan 2024 for mCRPC. Overall feeling ok, fatigue is a little improved which he believes is because his BP hasn't been as low as it was in the past. BP at home has been 120's/70-80's in the mornings. Ongoing hot flashes are not as intense. Appetite is decreased, weight is down 7lbs over the past month, but overall down 18lbs over 2 months. Urine from bilateral percutaneous nephrostomy tubes is "good", no hematuria. Ongoing pain in right buttock down the posterior leg, pain is worse with sitting for a prolonged period, improves with getting up and moving around, not interfering with his sleep, not needing any medication for pain. Denies fever, chills, night sweats, headache, dizziness, balance issues, chest pain, palpitations, SOB, cough, nausea/vomiting, diarrhea/constipation, abdominal pain, LE edema, bleeding.  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 NO AAW benefit. Started tawanda/pred mid June 2020, discontinued 12/26/23 for disease progression. Xtandi started early Jan 2024, decreased to 80mg daily in May 2024 d/t weight loss. Switched to docetazel in 12/2024 due to PSA progression. [de-identified] : 4/16/24 - continues on Xtandi since early Jan 2024 for mCRPC. Excess lambda light chains on last blood work, no M-spike. Feels well. no Pains. Has to some fatigue, naps during the day. Appetite is "good", No N/V/D/C. No cough, no CHEN. NO chest pain/pressure. No headaches, no dizziness. No balance issues, minimal, walks with a cane. No falls. Urine  flos via tubes, no recent infections. NO blood in urine. Some minor urine amounts from penis. No nocturia, no dysuria. Hot flushes occur, less than before, 1-2 times a day. No edema noted. Independent in ADLs.  CDX done, no targets  5/15/24 - continues on Xtandi since early Jan 2024 for mCRPC. Feeling "alright". Ongoing fatigue, naps 1-2x/day. Occasional hot flashes. Decreased appetite, not eating as much as before, drinking 1-2 Ensure per day, weight is down 5lbs over the past month. Bilateral percutaneous nephrostomy tubes in place, no hematuria. Ongoing arthritic pains of the shoulders and hands at times. Denies fever, chills, night sweats, headache, dizziness, balance issues, chest pain, palpitations, SOB, cough, nausea/vomiting, diarrhea/constipation, abdominal pain, LE edema, bleeding.   6/12/24 - continues on Xtandi since early Jan 2024 for mCRPC, decreased to 80mg daily as of May 2024 for weight loss. Ongoing fatigue is stable. Ongoing hot flashes. Appetite is "a little better". Urine from nephrostomy tubes is clear yellow, no hematuria. Denies fever, chills, night sweats, headache, dizziness, balance issues, chest pain, palpitations, SOB, cough, nausea/vomiting, diarrhea/constipation, abdominal pain, LE edema, bleeding, muscle or joint pain/weakness.  7/11/24 - on Xtandi since early Jan 2024 for mCRPC, decreased to 80mg daily as of May 2024 for weight loss. Patient accompanied by brother, reports to be feeling well, appetite slowly improving, continues to have nephrostomy tubes in places, last changed 23 weeks ago draining clear yellow urine. Denies fever, chills, headaches, chest pain, sob, abdominal pain/back pain.   8/14/24 - on Xtandi since early Jan 2024 for mCRPC, decreased to 80mg daily as of May 2024 for weight loss. Overall feeling well, ongoing fatigue is stable. Ongoing hot flashes.  Appetite is stable, weight is up a couple lbs since last visit. Diarrhea yesterday "all day" with >6-7 episodes of soft/loose stool, he took PRN Imodium yesterday, no more stool today, unsure if it was r/t something he ate. Urine from nephrostomy tubes is "good", no hematuria. Denies fever, chills, night sweats, headache, dizziness, chest pain, palpitations, SOB, cough, nausea/vomiting, constipation, abdominal pain, LE edema, bleeding, muscle or joint pain/weakness.  9/17/24 - on Xtandi since early Jan 2024 for mCRPC, decreased to 80mg daily as of May 2024 for weight loss. Feeling "alright", stable fatigue. Ongoing hot flashes are tolerable. Appetite is stable. Having intermittent diarrhea approx every other week with approx 3 episodes when it occurs but resolves with PRN Imodium 1-2 tabs, having normal BMs in between, unsure if diarrhea is r/t eating spinach. Bilateral nephrostomy tubes in place. Denies fever, chills, night sweats, headache, dizziness, chest pain, palpitations, SOB, cough, nausea/vomiting, constipation, abdominal pain, hematuria, LE edema, bleeding, muscle or joint pain/weakness.  10/16/24 - on Xtandi since early Jan 2024 for mCRPC, decreased to 80mg daily as of May 2024 for weight loss. Ongoing fatigue is stable. Ongoing hot flashes are tolerable. Appetite is "alright". Occasional diarrhea is improved, only 2 episodes of diarrhea over the past month. Bilateral percutaneous nephrostomy tubes in place, still passes some urine through his penis. Ongoing intermittent arthritic pains of the shoulders and knees are stable since before cancer diagnosis. Denies fever, chills, night sweats, headache, dizziness, chest pain, palpitations, SOB, cough, nausea/vomiting, constipation, abdominal pain, dysuria, hematuria, incontinence, LE edema, bleeding.   11/14/24: presents for follow up and management of metastatic CRPC on ADT + reduced dose enzalutamide in setting of weight loss and fatigue. His PSA has been noted to be rising over the last several visits from a manjinder of 8.13 to 15.3 at last visit on 10/16/24.   12/2/24: He presents for follow up and cycle 1 of docetaxel 75mg/m2 after his PSA was noted to rise further from 15.3 to 21.4. He has stopped enzalutamide.  He denies any new significant complaints since last visit.   12/23/24: He presents for follow up and cycle 2 of docetaxel 75mg/m2. He notes transient diarrhea and increased fatigue after cycle 1. Also notes alopecia and increased blood pressure when he takes steroid premedication. Denies fever, chills, night sweats, bony pain.  1/13/25:  He presents for follow up and cycle 3 of docetaxel 75mg/m2. He denies any significant change in symptoms. He has intermittent leaking from nephrostomy sites, increased on days of tx.  Notes continued fatigue. Also notes alopecia and increased blood pressure when he takes steroid premedication. Denies fever, chills, night sweats, bony pain.  2/3/25:  He presents for follow up and cycle 4 of docetaxel. He denies any significant change in symptoms.  Notes continued fatigue. Also notes alopecia and increased blood pressure when he takes steroid premedication. Denies fever, chills, night sweats, bony pain.   2/27/25: He presents for follow up and cycle 5 of docetaxel...now dose reduced to 60mg/m2 2/2 cytopenias and fatigue.  He denies any significant change in symptoms.  Notes continued fatigue. BP has been stable. Denies fever, chills, night sweats, bony pain. Last imaging in October. Will repeat after C6. PSA continues to trend downward.   3/20/25: He presents for follow-up and cycle 6 of docetaxel. Does not appreciate a symptomatic difference between the prior dose and his reduced dose he received last cycle. Endorses hot flashes. Denies pain. Nephrostomy tubes functional. Reports occasional blood from R tube. CBC with improvement in Hb since dose reduction. Has been losing weight although endorses good appetite and is "always thirsty".   4/10/25: He presents for follow-up and cycle 7 of docetaxel. Pt dose reduced to 60mg/m2 since C4 2/2 fatigue and cytopenias. Does not appreciate a symptomatic difference between the prior dose and his reduced dose he received last cycle. Endorses occasional hot flashes. Denies pain. Nephrostomy tubes functional. Less leakage since tube exchange. No bleeding. He continues on monthly xgeva. Denies jaw pain or issue with teeth. Pt noted with elevated /84. Pt reports that he did not take terazosin today because his BP was much lower at home. He states that "he only takes his medication when his morning BP is high". Pt asymptomatic. Pt weight noted to be trending downward. Lost 7 lbs since beginning of the year. Appetite reported as good. Weight loss not intentional.

## 2025-04-10 NOTE — PHYSICAL EXAM
[Restricted in physically strenuous activity but ambulatory and able to carry out work of a light or sedentary nature] : Status 1- Restricted in physically strenuous activity but ambulatory and able to carry out work of a light or sedentary nature, e.g., light house work, office work [Normal] : affect appropriate [de-identified] : (+) alopecia [de-identified] : anicteric  [de-identified] : Dry oral mucosa [de-identified] : no LE edema  [de-identified] : b/l nephrostomy tubes

## 2025-04-10 NOTE — REVIEW OF SYSTEMS
[Fatigue] : fatigue [Joint Pain] : joint pain [Hot Flashes] : hot flashes [Fever] : no fever [Chills] : no chills [Night Sweats] : no night sweats [Chest Pain] : no chest pain [Palpitations] : no palpitations [Lower Ext Edema] : no lower extremity edema [Shortness Of Breath] : no shortness of breath [Cough] : no cough [Abdominal Pain] : no abdominal pain [Vomiting] : no vomiting [Constipation] : no constipation [Dysuria] : no dysuria [Incontinence] : no incontinence [Joint Stiffness] : no joint stiffness [Muscle Pain] : no muscle pain [Muscle Weakness] : no muscle weakness [Dizziness] : no dizziness [Easy Bleeding] : no tendency for easy bleeding [Easy Bruising] : no tendency for easy bruising [de-identified] : occasional [de-identified] : hot flashes

## 2025-05-04 NOTE — PHYSICAL EXAM
[Restricted in physically strenuous activity but ambulatory and able to carry out work of a light or sedentary nature] : Status 1- Restricted in physically strenuous activity but ambulatory and able to carry out work of a light or sedentary nature, e.g., light house work, office work [Normal] : affect appropriate [de-identified] : (+) alopecia [de-identified] : anicteric  [de-identified] : Dry oral mucosa [de-identified] : b/l nephrostomy tubes [de-identified] : no LE edema

## 2025-05-04 NOTE — ASSESSMENT
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 He started abiraterone + prednisone in June 2020, dose was reduced to 500mg d/t transaminitis. Abiraterone discontinued late December 2023 for disease progression. Xtandi started Jan 2024.  He experienced transient benefit but developed progressing disease at this time as manifested by steadily rising PSA since 6/2024 and new lesions noted on CT imaging performed 10/30/24.   We discussed these results and that he will need an alternative therapy to control his disease. Standard of care options include taxane chemotherapy and Pluvicto. We discussed the potential risks and benefits associated with docetaxel. He was amenable and signed informed consent.   He is tolerating treatment well to date. He was noted to have worsening anemia. To reduce likelihood of significant anemia, we reduced the dose of docetaxel to 60mg/m2. He will continue current regimen. If PSA is noted to be rising at next visit, we will consider cessation and referral to Rad Onc for Pluvicto.   mCRPC: POD on tawanda/pred ->enzalutamide.  Now on docetaxel. Dose reduced to 60 mg/m2 since C4 2/2 fatigue/cytopenias - Feb 2024 [facility] CDx was negative for any actionable mutations, MSI high not detected, TMB 8 muts/Mb, ctDNA <1%.  -Continue Eligard q6mo injection with urology, given 12/12/24, next scheduled for 6/2025 -Proceed with cycle 8 docetaxel, dose reduced to 60mg/m2 -Pt instructed to schedule repeat imaging prior to next cycle. Orders in system, scheduled for next week. -trend PSA. Confirmed checking today in treatment. 14 on 4/10/25 -Concern for unintentional weight loss. Can have nutrition eval in treatment. Encouraged adding calories. - PSA seems to have plateaued, if patient loses response to chemo, would refer to Rad Onc/Nuc Med for Pluvicto evaluation.   Bones: - He is off Ca + D supplementation d/t hypercalcemia.  - Monthly Xgeva inj started 5/15/24, continue  Cytopenia/anemia -Likely treatment related. No bleeding. Monitor hgb. HGB 9.6 today -Docetaxel, dose reduced to 60mg/m2 C4  HTN -Concern for sporadic dosing of Terazosin. Encouraged f/u with pmd.   Instructed to contact our office with any new/worsening symptoms. Pt and family member educated regarding plan of care, all questions/concerns addressed to the best of my abilities and their apparent satisfaction.  RTC 3 weeks    [FreeTextEntry2] : Health care proxy form provided to pt 12/5/23, he still has at home. Instructed once again to bring the completed form to his next office visit.  [AdvancecareDate] : 1/2/24

## 2025-05-04 NOTE — HISTORY OF PRESENT ILLNESS
[Disease: _____________________] : Disease: [unfilled] [T: ___] : T[unfilled] [N: ___] : N[unfilled] [M: ___] : M[unfilled] [ECOG Performance Status: 1 - Restricted in physically strenuous activity but ambulatory and able to carry out work of a light or sedentary nature] : Performance Status: 1 - Restricted in physically strenuous activity but ambulatory and able to carry out work of a light or sedentary nature, e.g., light house work, office work [de-identified] : This 66-year-old male was first seen in consultation on July 20, 2016. In August of 2013 his PSA was 4.3. In September 30, 2013 it was 4.9. He underwent a biopsy in March 5, 2014 revealing Ade 7 (3+4) disease. He underwent external beam radiation therapy for a total dose of 8100 cGy in 45 fractions from May 25 and total July 31 of 2014. He subsequently experienced PSA failure and brachytherapy was considered. A CT scan was performed as part of that evaluation revealing a filling defect in the left ureter. In March 2016, he presented to the emergency room with a creatinine of 13.7 and potassium of 8.9. Bilateral nephrostomy tubes were placed. He was found to have GI bleeding and a colonoscopy revealed evidence of radiation proctitis. He has been on Lupron. 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 His initial staging was stage II, [id number].    The original pathology was reviewed prior to radiation. The left base revealed adenocarcinoma the prostate, Dae score 7 (3+4) involving 2 out of 2 cores, 40% of each core, with perineural invasion. The left mid zone revealed adenocarcinoma the prostate, Ade score 7 and (4+3) involving 50% of each of 2 cores, with perineural invasion noted. The left apex had 3 of 3 cores involved. One core was Ade 7 (3+4) involving 100% of the core. The other 2 cores had a Ade 6 (3+3) in less than 5% of 2 cores. Perineural invasion was identified as well.  Posttreatment biopsy revealed the presence of adenocarcinoma within the prostate gland. The PSA was 11 at the time of the biopsy.  Feels well at initial consultation. Started Lupron in January 2016 before the renal failure/obstruction. No pains. Fatigue at times. Appetite good, stable weight. Lost weight with illness in March, typical weight prior was 210, left hospital at 170. Weight stable. He was transfused multiple times. Some urine through penis recently. Has bilateral percutaneous nephrostomies. No blood, dysuria, no incontinence. Nocturia x 2-3 at this time. Right sided nephrostomy still has output, small amount in left bag. Due for change of tubes in September. Had radiation proctitis and has laser treatment. Sees GI in follow-up next month.   10/18/16...Had a colonoscopy, showed radiation colitis. No further rectal blood noted. Urine flow mostly from PCNs, some from penis. No dysuria, some  hematuria noted at times. Appetite is normal. No weight loss. No fatigue. Hot flushes occur daily. .  4/18/17...last scans 9/26.  Saw PCP and had PSA done, shows slight increase over manjinder. he was given ferrous sulfate to take for anemia. No pains. Urine flow is mostly thru PCNs. Still has occasional blood in the urine, noted in the bag. Has less volume form the left side.  Hot flushes occur a few a day, 15-20 minutes, occ sweats. Appetite is good, no weight loss. Some fatigue noted. No edema. Occ blood with stools, saw Dr Grossman in September, due for follow up. has not had major bleeding recently.   8/1/17...Surya states that he is feeling good, his urine flow is strong, slight increase, no pains anywhere, he gets hot flashes a "few  day, they are coming." He has some minor fatigue, He has percutaneous nephrostomies, He follows with Dr. Dick, no obvious hematuria, no breast tenderness.  11/14/17...Received Lupron from Dr Dick a few weeks ago. PSA was 0.02 on 8/1/17. He gets hot flashes a couple times a day, He has b/l nephrostomies, is able to pass urine through his penis. He denies hematuria, dysuria He states appetite is good. He gets some fatigue, He denies any pains.  4/17/18...Received Lupron from Dr Dick Jan 23, 2018. Not seen since November 17, due to illness. 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 Feels well. No pains noted. Some fatigue. Appetite is good. Reports that he has diarrheal for 2-3 months, not daily. He had loose stools this AM, took Imodium. Diarrhea occurs 1-2 days a week, the n stops. Some flatus, no cramps. No blood in the stool. Urine flow is via nephrostomy, minor amount thru penis, no dysuria, no incontinence. No blood in urine. Hot flushes most days.    Minor fatigue. Appetite normal.   7/17/18...On CAB. Feels "okay". No pains. Has bilateral nephrostomy tubes, due for change in August. No recent infections, occasionally passes urine via penis, no blood, no dysuria. Still has hot flushes, daily, more in the winter. Some fatigue, no physical impairment. Appetite is good. Weight stable. Diarrhea persists, on and off, not daily. Watery at times. takes Imodium on occasion. Occ small amount of blood with hard stools, secondary to radiation proctitis.   10/23/18...Feels "okay". No pains. Urine flow is minimal, most comes out of the nephrostomy tubes, nocturia -none. No blood, no dysuria. No incontinence. Hot flushes occur, day and night. Appetite is good, no weight. No dyspepsia, no nausea, no vomiting. Diarrheal stools occur episodically, every 2-3 weeks. No blood in the stool. Usually 1-2 BMs per day. Occasionally notes blood on toiler paper with hard stool. has RT proctitis.   2/5/19...Feels well, no pains. has hot flushes frequently, daily. Has some sweats with them at night. Appetite is good, no weight loss. Urine flow is minimal thru penis, nocturia does not occur. Most urine form PCNs, last catheter change January 2019, by IR at [facility]. Occ minor blood in the urine, when the catheter is loose. No back pains, no bone pains. Occ fatigue.   5/7/19...Had a calcium of 11.1 last visit. Called him and told to stop calcium and vit D. Repeat 3 weeks later was normal. Continues on Lupron and Casodex. Feels well. Hot flushes every day. He has some fatigue, unchanged. Appetite is good, no weight loss. Urine is minimal from penis, has bilateral PCNs. No bone pains. No edema.   8/8/19...Feels "all right", as a URTI. No pains noted. Hot flushes multiple time a day. Fatigue is present, mild. Urine flow is 'good', PCN bilateral, most flow form the right. Some flow thru penis, voids 1-2 times a day. NO blood, no dysuria. Due for PCN change next week. Appetite is good, no weight change.  10/29/19...Local failure prostate cancer, on CAB. Feels well. No pains., Urine flow is good. Has bilateral PCNs. Most of urine goes to PCNs. Occ small amount of blood in the bag. Voids 2 times a day. Hot flushes, daily, with sweats on occasion. has fatigue at times. Appetite is good, no weight loss. NO leg edema. No bleeding from the RT proctitis recently  1/28/20...Stopped bicalutamide after last visit. Last seen 3 months ago. PSA on 1/7 was 0.27, thus continued to rise somewhat. NO bone pains. Hot flushes daily, occ sweats. Appetite is good, no weight loss. No fatigue of note. Urine flow is good, but most flow is into the PCNs. Last change of PCNs was December after he had some leaking on one side. No hematuria, no dysuria. No incontinence.   5/6/20...Verbal permission granted for telephone services by patient, Surya Cervantes, on 5/6/20 at 1:35 PM.   Fells well. No pains noted. Nephrostomy tubes in place, replaced last week. Appetite is good, no weight loss. Hot flushes daily. He has chronic fatigue complaints. No leg edema noted. Urine through penis is minimal, only when tubes become clogged. No blood in the urine. No nausea, no vomiting. Has diarrhea at times, once every 2-3 weeks. No back pains noted. No fevers, no chills., No cough, no sputum.   6/17/20...Verbal permission for telephone contact was granted by the patient, Surya Cervantes, on June 17, at 4 PM. Feels "all right". NO pains. Hot flushes, daily, occ minor sweats. Fatigue is present, rated mild, occasional naps. Appetite is good, no weight loss. No edema. Urine flow is minimal from penis, has bilateral PCNs. No blood. No incontinence. No recent infections. No cough NO CHEN. No F/C. Helps in care of his mother, watches TV, not very active. Jeana from [email or web address].   7/29/20...Verbal permission for telephone contact was granted by the patient, Surya Cervantes, on July 29, 2020 at 3:10 PM.  Started tawanda/pred on June 16, 2020. Feels  "pretty good". He was having diarrhea prior and he no longer had diarrhea.   No pains at this time. No edema. No headaches. Checks BP on occasion.  BPs have been "normal", he will check often. Urine flow "about the same",. Most urine comes from PCNs. No nocturia, no  hematuria, no dysuria. Appetite is normal, thinks he may have lost a few pounds. No cough, No CHEN. NO F/C. Hot flushes occur daily. No change. Mild fatigue.  8/26/20...On abiraterone and prednisone since June, PSA dropped significantly. taking meds correctly. Feels 'all right", no pains. Fatigue is present, as before, "not terrible", takes naps. Slightly more fatigue than before. Appetite is good, but lost weight. eats with his mother, she eats less and so does he. No nausea, no vomiting. No headaches, no edema of note. Most of urine comes from PCNs, Urine from penis is minimal, does not get up at night. No blood, no dysuria. No incontinence. No blood in the PCN bags.  Has leg cramps daily, particularly in the AM. Dr Angulo administers Lupron, due for Rx on 9/1/20.   9/22/20...He is seen in the office today in follow-up. He's been on abiraterone and prednisone since June. He had elevated transaminases on September 3 and his abiraterone was subsequently held. He will have repeat blood work done today after today's visit. He states that he feels "all right." His appetite is stated to be "all right." His weight has been stable. He denies dyspepsia, nausea or vomiting. He denies constipation or diarrheal stools. There is no cough or shortness of breath. He denies skin rashes or pruritus. There are no complaints of pains at this time. Fatigue is present but it does not impair his daily activities. He takes occasional naps during the day. He denies arthralgias or myalgias. There are no headaches or dizziness. He has bilateral percutaneous nephrostomy tubes draining yellow urine. There is no blood in the percutaneous nephrostomy bags. Most of the urine comes from the percutaneous nephrostomy tubes with minimal amount of urine coming out through the penis. There is no nocturia. There is no hematuria or dysuria. There is no incontinence. There is no edema in the extremities. He has occasional hot flashes but felt it has improved these past few days. He denies fevers or chills. He infrequently checks his blood pressure at home. He would get blood pressure readings ranging from 150s-160s systolic over 80s-90s diastolic. He remains independent in his activities of daily living.   10/27/20...Verbal permission for telephone services granted by the patient,  Surya Cervantes on October 27, 2020 at 3:48 PM.  Stared abiraterone in June. Feels "pretty good". NO increased edema. No headaches. Hot flushes are less. Appetite is good, no weight loss. Has PCNs, has urine form the penis at times. NO blood in the urine. No dysuria. No nocturia. No incontinence. No bone pains noted. Fatigue  RTs, takes a nap at times. Cares for his elderly mother at home.   11/25/20...Verbal permission for telephone services granted by the patient,  Surya Cervantes on November 25, 2020 at 1:50 PM On 1/2 dose tawanda due to transaminases. No pains, feels "all right". Some fatigue. Urine flow form penis in minimal, has bilateral PCNs. Occasional small amount of blood in the right PCN tube. Appetite is good, no weight loss. No cough, no CHEN. Hot flushes daily. No headaches, no edema. BP monitored at home, 151/89, pulse 95.   12/23/20...Verbal permission for telephone services granted by the patient,  Surya Cervantes on December 23, 2020 at  1:32 PM.  Feels well. No pains noted. Urine flow RTS. Has PCNs, minimal urine via penis on occasion. No blood in urine or tubes of note. Appetite is good, no weight loss. NO edema of the legs. NO headaches, no dizziness. No cough, no CHEN. No F/C. Hot flushes occur daily, less often. Due Lupron next month from Dr Angulo. Mild fatigue, take a nap. Independent in ADLs, cares for his mother. BP at home 138/90, pulse 71. Left arm 147/92  1/28/21...Verbal permission for telephone services granted by the patient,  Surya Cervantes on January 28, 2021 at 315 PM.  Surya reports that he has no pains.  There is no significant urine within his blood.  In the interim, he had to have the left percutaneous nephrostomy tube changed as there was pus present.  They had some difficulties apparently.  The tube was clogged and he was unable to change it over a guidewire.  He was not treated with any antibiotics.  He does have some urine from the penis at times.  There is no fevers or chills.  His appetite is good and there is no weight loss.  He reports that his weight is 190 pounds today.  He says his blood pressure was 146/92 in the left arm and 145/85 on the right arm.  The pulse rate was 75.  He reports no edema or headaches.  There are some hot flushes.  2/25/21...Verbal permission for telephone services granted by the patient,  Surya Cervantes on 2/25/21 at 3:10 PM Feels "all right". NO pains. He was having issues with PCNs clogged, had to have removal and reinsertion. It was the right side this week, done on Tuesday. Told to flush it daily rather than every 3 days. Appetite is good, no weight loss, no edema. No cough, no CHEN. BP is monitored, right side 139/84 today, left 134/82, pulse 79. No headaches, no significant fatigue, does nap on occasion. NO leg weakness. No N/V/D/C. Minimal urine from penis. There was more flow when the dysfunction occurred. NO blood in the urine, no dysuria.   4/7/21...  10/26/21....Last evaluation was 4/7/21, by telephone. Completed antibiotics. Was in rehab for 6 weeks. Strength is good, no pains. Appetite is good, gained some weight. urine flow is good, no incontinence, very little urine from penis, mostly from the tubes. No headaches, no dizziness, no balance issues, No falls. Hot flushes occur. Lives with mother, helps her. No edema at this time.   From neurosurgery, this summary of events....68 yo Male w/ PMHx of HTN, prostate CA (on chemo, s/p radiation), b/l nephrostomy tubes, presented  to [facility] ED on 6/14/21 with weakness and confusion. In ED foulurine in b/l nephrostomy bags noted, tachycardic, and hypotensive. s/p IVF and levo drip for septic shock, developed fluid overload with pitting edema and decreased  EF. Imaging of the spine concerning for discitis and osteomyelitis c-spine with T1 inflammation/signal changes.   Hospital coursed remarkable for BC + Klebsiella and strep anguinous. Started on vanco/zosyn 6/14. As per ID switched to ceftriaxone 2 g on 6/17. Hospitalization c/b ASHLEY on CKD with right hydronephrosis. IR consulted- no intervention, both nephrostomy tubes flushing adequately. Nephrology  consulted, started on stress dose steroids/hydrocortisone 50 mg q6 and Midodrine. CTAP showed Sludge in gallbladder.  Patient  underwent C6-7 corpectomy and posterior fusion C4-T2 on 6/29/21. Hospital course c/b tachycardia, fever with low O2 sats. Chest CT negative for  PE, lower extremity Dopplers negative. s/p PICC, discharged to inpatient rehab on 7/12/21 and then was discharged home after completion of rehab.   Last Eligard was August with Dr Angulo, 30 mg dose.   11/30/21...on tawanda/pred since June 2020. Feels  "all right". Has hot flushes and fatigue. Does not prevent activities. He falls asleep easily if he sits in a chair. Independent in ADLs, cares for his mother. No infections recently, No F/C. No recent adventures with with his PCNs. No blood in urine, occ urine via penis. Appetite is good, gained 2.2 kilos. Edema decreased. No chest pain/pressure. No cough/ CHEN. No headaches, no dizziness. No N/V/D/C.   1/11/22....telehealth today. Feels  "all right". No pains of note. t flushes daily. Has some fatigue as well. No edema noted. NO cough/CHEN No chest pain/pressure. No F/C. Appetite is good, no weight loss. Weighs 176. Checks BP at home, 142/98 RP 72 today, L arm, 142/91. Takes terazosin at night. Sees PCP in March. Needs to call PCP for tighter BP control. NO headaches, no dizziness. No F/C. No hematuria, has bilateral PCNS, has some small amount of urine from the penis. Had change of PCNs last week. Independent in ADLs.   2/8/22 - telehealth visit today. Continues on abiraterone 500mg daily + prednisone. Reports his BP today was 143/94 with HR of 74. Overall feeling "alright". Moderate fatigue, takes naps during the day. Occasional hot flashes. Appetite is good, weight today is 180lbs, he attributes recent weight gain to eating better after being discharged from the hospital. Mild ptosis of left eyelid comes and goes and has reportedly been stable for his "whole life". Bilat percutaneous nephrostomy tubes in place, no hematuria. Ongoing mild BLE edema is reportedly stable. Ongoing intermittent arthritis pains of left knee and right shoulder are stable. Denies fever, chills, headache, dizziness, balance issues, eye pain/problems, mucositis/odynophagia, chest pain, palpitations, SOB, cough, nausea/vomiting, diarrhea/constipation, abdominal pain, hematuria, rash/pruritus, neuropathy, bleeding, weakness, anxiety/depression.  3/17/22...tawanda/pred started mid June 2020. PSA was 11.6 at start. Feels "all right". No pains. No major issues with the PCNs, having a change done next week. Appetite is good, no weight loss. No cough, No CHEN. Some leg edema. No chest pain/pressure. Urine from penis is relatively little. No dysuria, no blood. has calcium oxalate excretion, which causes his tubes to be clogged. Notes some bruising. NO headaches, no dizziness, no balance issues. No F/C. No N/V/C, some minor diarrheal stools at times. Takes Imodium as as needed. Fatigue is present at times, has hot flushes occur multiple times a day.   4/20/22 - continues on abiraterone (500mg daily) + prednisone since June 2020 for mCRPC. Overall feeling "alright", ongoing mild fatigue is stable. Intermittent hot flashes are tolerable. Mild night sweats during the summer when it gets warmer. Ongoing mildly productive cough which he's had for "a long time". Occasional diarrhea that doesn't last more than a day. Bilateral percutaneous nephrostomy tubes in place, passing minimal urine from his penis. Occasional hematuria especially from right nephrostomy bag, clear today. Ongoing mild BLE edema. Ongoing chronic left knee swelling and pain 2/2 arthritis. Ongoing right shoulder pain 2/2 arthritis as well. Denies fever, chills, headache, dizziness, balance issues, eye pain/problems, mucositis/odynophagia, chest pain, palpitations, SOB, nausea/vomiting, constipation, abdominal pain, rash/pruritus, bleeding, muscle pain/weakness  5/24/22 - continues on abiraterone (500mg daily) + prednisone since June 2020 for mCRPC. BP today is 164/95, asymptomatic. BP at home has been 150s/80-90s. On occasion the DBP has been >100. Overall feeling "alright", ongoing fatigue is stable. Ongoing intermittent hot flashes. Appetite is good. Vision is changing in his left eye, he has an appt with [facility] this Thurs. Bilateral nephrostomy tubes in place, occasional hematuria at times resolves after 2-3 days. Ongoing trace edema of BLEs. Intermittent pruritus around nephrostomy tube dressings. Denies fever, chills, night sweats, headache, dizziness, balance issues, mucositis/odynophagia, chest pain, palpitations, SOB, cough, nausea/vomiting, diarrhea/constipation, abdominal pain, dysuria, incontinence,  rash, neuropathy, bleeding, muscle or joint pain/weakness.   6/21/22 - continues on abiraterone, half dose + prednisone since June 2020 for mCRPC. PSA 11.6 at start of treatment in June 2020, manjinder PSA at o.o5 August 2021, slow rise, recent PSA in May 2022 was 0.17.  feels  "all right". No pains noted. Has bilateral PCNs, changed last week. No infections, no F/C. Some fatigue, always, no worse. Appetite is good, no weight loss. NO headaches, no dizziness, no balance issues. Some edema noted, as before. No chest pain/pressure. NO bone pains. No N/V/D/C. Small amounts of urine via the penis. Hot flushes daily, multiple times. No cough, no CHEN.   7/28/22 - continues on abiraterone 500mg + prednisone since June 2020 for mCRPC. Overall feeling "alright", ongoing mild fatigue for which he occasionally takes a nap once a day. Ongoing hot flashes are tolerable. Appetite is good. Occasional cough. Bilateral percutaneous nephrostomy tubes in place passing most of the urine through the bags but still passing minimal urine via penis, no nocturia. Trace edema of BLEs. Notes intermittent cramping of hands which may be arthritis as it improves with movement. Has ongoing intermittent right buttock pain radiating down to the knee, present for the past year, pain is worse with sitting for a long time, improves with changing positions, max pain is 3/10 not requiring medication. Denies fever, chills, night sweats, headache, dizziness, balance issues, eye pain/problems, mucositis/odynophagia, chest pain, palpitations, SOB, nausea/vomiting, diarrhea/constipation, abdominal pain, dysuria, hematuria, incontinence, rash/pruritus, bleeding, weakness.   10/11/22 - continues on abiraterone 500mg daily + prednisone since June 2020 for mCRPC. Neurosurgical notes reviewed...."S/P cervical spinal fusion, 70 year old male 15 months pos op C6-7 corpectomy and posterior fusion C4-T2 on 6/29/21 with Dr. Tiffanie Martinez". Now seeing Dr Resendiz. Overall, feels "all right". No pains noted. urine flow is "fine", no incontinence, most urine still out PCNs, some from the penis. No blood, no dysuria. Hot flushes daily.  Some fatigue, RTS. Appetite is good, no weight loss. No headaches, dizziness, no balance issues, No paresthesias. Occ cough, sputum is white. No CHEN. Tubes to be changed next week. No N/V/D/C.  Some edema , improved. No chest pain/pressure  11/9/22 - continues on abiraterone 500mg daily + prednisone since June 2020 for mCRPC which he is tolerating well. Pt reports feeling generally well. No new complaints. Bilateral nephrostomy tubes in place, denies hematuria . Ongoing trace edema of BLEs. Intermittent pruritus around nephrostomy tube dressings. Denies fever, chills, night sweats, headache, dizziness, balance issues, mucositis/odynophagia, chest pain, palpitations, SOB, cough, nausea/vomiting, diarrhea/constipation, abdominal pain, dysuria, incontinence, rash, neuropathy, bleeding, muscle or joint pain/weakness. Appetite reported as good. No changes in medications  12/7/22 - continues on abiraterone 500mg daily + prednisone since June 2020 for mCRPC.  gets his Eligard 45 tomorrow form Dr Angulo. feels "all right". No pains noted except for arthrtic complaints. Appetite is okay< dropped a few pounds. usual edema, no change. No headaches, NO dizziness. Most of the urine comes from PCNs, changed every 6 weeks due to prior infections. NO fevers, no chills. Hot flushes occur, 1-2 times a day. Notes a bit more urine thru penis lately. Occ cough, no CHEN. No chest pain/pressure/palpitations. NO N/V/D/C. Mild fatigue. Independent in ADLs.   1/4/23 - continues on abiraterone 500mg daily + prednisone since June 2020 for mCRPC. Overall feeling "alright", ongoing fatigue is stable. Ongoing hot flashes are tolerable. Appetite is "ok". Ongoing occasional productive cough is stable. Bilateral perc nephrostomy tubes in place, continues to pass some urine through the penis, no nocturia. Denies fever, chills, night sweats,headache, dizziness, balance issues, eye pain/problems, mucositis/odynophagia, chest pain, palpitations, SOB, nausea/vomiting, diarrhea/constipation, abdominal pain, dysuria, hematuria, incontinence, LE edema, rash/pruritus, bleeding, muscle or joint pain/weakness.   2/13/23..... continues on abiraterone 500mg daily + prednisone since June 2020 for mCRPC. treated with cephalexin and developed rash, red skin, pruritus. Chart labeled as allergy. Hospitalization was brief. sudden onset of symptoms, Hd diarrheal stools as well. Feels  "pretty good", except for residual pruritus. No pains noted.  Has tube change set for next week. Appetite has retuned. No N/V. Diarrhea resolved, NO taste alteration. No headaches, no dizziness. had some edema. resolved. No chest pain/pressure/palpations.  has his usual fatigue and hot flushes. Cough, chronic, with clear sputum. No CHEN/SOB. No fevers of chills since discharge  Hospital Course:  Discharge Date	01-Feb-2023  Admission Date	29-Jan-2023 17:52  Reason for Admission	Syncope  Hospital Course	  69 yo M w/ HTN, gout, prostate cancer s/p b/l nephrostomy tubes, on abiraterone, admitted w/ sepsis 2/2 UTI, improved on antibiotics.  Sepsis secondary to UTI.  was febrile, tachycardic in the ED, met criteria for sepsis on admission  - U/A w/ +nitrite, large LE, prior cultures with klebsiella and E. coli  - current Ucx >3 organisms suggestive of collection contamination  - received IV zosyn (1/29/23-2/1/23),  Bcx negative, discharge on oral cephalexin to complete 10 days for complicated UTI (end date 2/7/23)  - sepsis resolved (currently afebrile, without leukocytosis, tachycardia resolved)  - IR consulted nephrostomy tubes in position and draining well, no indication for exchange at this time.   Gastroenteritis.   had reported nausea/vomiting/diarrhea after eating fried rice w/ beef  - s/p IV fluids, now resolved.   Syncope.   reports syncopal episode likely 2/2 dehydration from gastroenteritis  - s/p IV fluids, EKG sinus rhythm, troponin indeterminate but no increased  - reports symptoms resolved, ambulating around unit independently (advised call don't fall).   3/21/23..... continues on abiraterone 500mg daily + prednisone since June 2020 for mCRPC. treated with cephalexin and developed rash, red skin, pruritus. Chart labeled as allergy. Overall, he feels "all right". No pains of note. Appetite is good, no weight loss. No edema. No chest pain/pressure/palpitations. some cough, no CHEN. Hot flushes daily. Fatigue is present.  Does yoga, runs in place. No HA, no dizziness, no falls. Independent in ADLs. no fevers, no chills. Minimal urine from penis, no dysuria. PCNs are changed every 6 weeks, no blood in tubes. No N/V/D/C.   4/17/23.....  on abiraterone 500mg and prednisone since June 2020 for mCRPC. Prior transaminitis led to decrease in dose. "doing okay". Minor discomfort on left side near the PCN. Has change every 6 weeks at this time. Appetite is good, no weight loss. No N/V/D/C. Has some urine form the penis, noted if PCNs pinch. No blood in the urine, no hematuria. Hot flushes as before, about 3 times a day.  fatigue RTS. No cough, no CHEN, No fevers, no chills. No edema, no chest pains,pressure/palpitations  Exercises every AM.   5/16/23....continues on abiraterone 500mg and prednisone since June 2020 for mCRPC. Prior transaminitis led to decrease in dose. Eligard next month with Dr Angulo. Overall, feels "all right". No pains noted. Appetite is good, no weight loss. No N/V/D/C. Urine from penis on occasion, empties bladder before bed. No hematuria, no dysuria. No fevers, no chills. PCNs were changed last week. No headaches, no dizziness, no balance issues.  Exercises daily, walking and running. Occ cough, no CHEN. No chest pain/pressure/palpitations. Hot flushes remains. No fevers, no chills  6/12/23.... on abiraterone 500mg and prednisone since June 2020 for mCRPC. Prior transaminitis led to decrease in dose. Mara with Dr Angulo. Feels "well". NO pains, except for radicular pain in right thigh, present for about 6 weeks, constant. uses a topical, icy-hot. Does not awaken him, better when he is lying down, aggravated by ambulation. Appetite is normal. No weight loss. NO fevers, no chills. NO fatigue. No headaches, no dizziness, no paresthesias. No cane. No falls. No cough, no CHEN. NO N/V/D/C. Urine from PCNs. Some from the penis, no hematuria, no dysuria. No nocturia. Hot flushes daily. No edema. NO chest pain/pressure/palpitations. No back pains. Independent in ADLs  7/18/23 - continues on abiraterone + prednisone (500mg daily d/t transaminitis) for mCRPC since June 2020. Ongoing fatigue after periods activity, naps once a day. Ongoing hot flashes are tolerable. Appetite is good. Occasional cough. Bilateral perc nephrostomy tubes in place, scant blood if the tubing moves around but not persistent. Ongoing pain in right buttock radiating down posterior thigh/leg, no pain with lying down, pain is worse with sitting for a prolonged time and improves with walking, max pain is 8/10. Not taking any pain medication as the pain eases up when he gets up and walks around.   8/16/23 - continues on abiraterone + prednisone (500mg daily d/t transaminitis) for mCRPC since June 2020. Overall feeling "alright", ongoing fatigue is stable. Ongoing frequent hot flashes, tolerable. Appetite is "alright". Ongoing occasional cough. Perc nephrostomy tubes exchanged 2wks ago, occasional transient hematuria which he attributes to accidentally pulling on the tubes. Ongoing stable intermittent right buttock pain radiating down posterior thigh down to the calf, improves with movement and worse with sitting for a long time, max pain is 5/10. Using a topical cream called "Australian Dream" which has been helpful.   9/20/23 - continues on abiraterone + prednisone (500mg daily d/t transaminitis) for mCRPC since June 2020. last PCN change was last week. On a 6 week change protocol. Feels "not bad", chronic leg pains, form right buttock, down the leg. Passing urine via penis, very small amounts. No blood, no dysuria. No nocturia. No incontinence, voids about 2 times a day. Hot flushes, every day, no sweats. Fatigue is present, naps, feels refreshed afterwards. Does yoga, stretching, walking, lifts some weights, daily basis. Appetite is good, no weight Kenji Occ cough, no CHEN. No edema, no chest pain/pressure.   10/31/23 - continues on abiraterone + prednisone (500mg daily d/t transaminitis) for mCRPC since June 2020. Overall feeling "alright", ongoing fatigue is stable. Occasional hot flashes. Appetite is "alright". Perc nephrostomy tubes in place, passing minimal urine through his penis. Ongoing pain in right buttock that radiates down the posterior leg, worse in the morning when first waking up and worse with sitting for a prolonged period, improves/resolves with activity. Denies fever, chills, night sweats, headache, dizziness, balance issues, chest pain, palpitations, SOB, cough, nausea/vomiting, diarrhea/constipation, abdominal pain, dysuria, hematuria, incontinence, LE edema, bleeding.   12/5/23 - continues on abiraterone + prednisone (500mg daily d/t transaminitis) for mCRPC since June 2020. Overall feeling well, some fatigue but remains active and exercising regularly. Ongoing hot flashes are tolerable. Appetite is good. Bilateral percutaneous nephrostomy tubes in place, no hematuria. Ongoing right buttock pain that radiates down the posterior leg, worse in the morning and improves with activity, max pain is 4-5/10, declines referral to PM&R. Denies fever, chills, night sweats, headache, dizziness, balance issues, chest pain, palpitations, SOB, cough, nausea/vomiting, diarrhea/constipation, abdominal pain, LE edema, bleeding.  1/2/24 - on abiraterone + prednisone since June 2020, discontinued 12/26/23 for POD seen on 12/21/23 bone scan. Feeling well, no significant fatigue. Remains active and doing exercises daily. Occasional hot flashes are tolerable. Bilateral percutaneous nephrostomy tubes in place, no hematuria. Ongoing right buttock pain that radiates down the posterior leg, worse in the morning and improves with activity, max pain is 7-8/10, pain is stable. Denies fever, chills, night sweats, headache, dizziness, balance issues, chest pain, palpitations, SOB, cough, nausea/vomiting, diarrhea/constipation, abdominal pain, LE edema, bleeding.   2/14/24 - Xtandi started early Jan 2024 for mCRPC. Over the past week he's been very fatigued. Ongoing hot flashes. Appetite is decreased and not eating as much as he used to, weight is down 11lbs over the past 6wks. Stable cough since before start of Xtandi. Percutaneous nephrostomy tubes in place, no hematuria. Notes some pains in the hands, knees, and ankles which he attributes to arthritis. Ongoing pain in right buttock radiating down posterior leg, pain is worse in the AM when first waking up and improves with activity/walking, pain waxes and wanes, max pain is 7/10 but not taking any medication for this. Denies fever, chills, night sweats, headache, dizziness, balance issues, chest pain, palpitations, SOB, nausea/vomiting, diarrhea/constipation, abdominal pain, LE edema, bleeding.   3/14/24 - continues on Xtandi since early Jan 2024 for mCRPC. Overall feeling ok, fatigue is a little improved which he believes is because his BP hasn't been as low as it was in the past. BP at home has been 120's/70-80's in the mornings. Ongoing hot flashes are not as intense. Appetite is decreased, weight is down 7lbs over the past month, but overall down 18lbs over 2 months. Urine from bilateral percutaneous nephrostomy tubes is "good", no hematuria. Ongoing pain in right buttock down the posterior leg, pain is worse with sitting for a prolonged period, improves with getting up and moving around, not interfering with his sleep, not needing any medication for pain. Denies fever, chills, night sweats, headache, dizziness, balance issues, chest pain, palpitations, SOB, cough, nausea/vomiting, diarrhea/constipation, abdominal pain, LE edema, bleeding.  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 NO AAW benefit. Started tawanda/pred mid June 2020, discontinued 12/26/23 for disease progression. Xtandi started early Jan 2024, decreased to 80mg daily in May 2024 d/t weight loss. Switched to docetazel in 12/2024 due to PSA progression. [de-identified] : 4/16/24 - continues on Xtandi since early Jan 2024 for mCRPC. Excess lambda light chains on last blood work, no M-spike. Feels well. no Pains. Has to some fatigue, naps during the day. Appetite is "good", No N/V/D/C. No cough, no CHEN. NO chest pain/pressure. No headaches, no dizziness. No balance issues, minimal, walks with a cane. No falls. Urine  flos via tubes, no recent infections. NO blood in urine. Some minor urine amounts from penis. No nocturia, no dysuria. Hot flushes occur, less than before, 1-2 times a day. No edema noted. Independent in ADLs.  CDX done, no targets  5/15/24 - continues on Xtandi since early Jan 2024 for mCRPC. Feeling "alright". Ongoing fatigue, naps 1-2x/day. Occasional hot flashes. Decreased appetite, not eating as much as before, drinking 1-2 Ensure per day, weight is down 5lbs over the past month. Bilateral percutaneous nephrostomy tubes in place, no hematuria. Ongoing arthritic pains of the shoulders and hands at times. Denies fever, chills, night sweats, headache, dizziness, balance issues, chest pain, palpitations, SOB, cough, nausea/vomiting, diarrhea/constipation, abdominal pain, LE edema, bleeding.   6/12/24 - continues on Xtandi since early Jan 2024 for mCRPC, decreased to 80mg daily as of May 2024 for weight loss. Ongoing fatigue is stable. Ongoing hot flashes. Appetite is "a little better". Urine from nephrostomy tubes is clear yellow, no hematuria. Denies fever, chills, night sweats, headache, dizziness, balance issues, chest pain, palpitations, SOB, cough, nausea/vomiting, diarrhea/constipation, abdominal pain, LE edema, bleeding, muscle or joint pain/weakness.  7/11/24 - on Xtandi since early Jan 2024 for mCRPC, decreased to 80mg daily as of May 2024 for weight loss. Patient accompanied by brother, reports to be feeling well, appetite slowly improving, continues to have nephrostomy tubes in places, last changed 23 weeks ago draining clear yellow urine. Denies fever, chills, headaches, chest pain, sob, abdominal pain/back pain.   8/14/24 - on Xtandi since early Jan 2024 for mCRPC, decreased to 80mg daily as of May 2024 for weight loss. Overall feeling well, ongoing fatigue is stable. Ongoing hot flashes.  Appetite is stable, weight is up a couple lbs since last visit. Diarrhea yesterday "all day" with >6-7 episodes of soft/loose stool, he took PRN Imodium yesterday, no more stool today, unsure if it was r/t something he ate. Urine from nephrostomy tubes is "good", no hematuria. Denies fever, chills, night sweats, headache, dizziness, chest pain, palpitations, SOB, cough, nausea/vomiting, constipation, abdominal pain, LE edema, bleeding, muscle or joint pain/weakness.  9/17/24 - on Xtandi since early Jan 2024 for mCRPC, decreased to 80mg daily as of May 2024 for weight loss. Feeling "alright", stable fatigue. Ongoing hot flashes are tolerable. Appetite is stable. Having intermittent diarrhea approx every other week with approx 3 episodes when it occurs but resolves with PRN Imodium 1-2 tabs, having normal BMs in between, unsure if diarrhea is r/t eating spinach. Bilateral nephrostomy tubes in place. Denies fever, chills, night sweats, headache, dizziness, chest pain, palpitations, SOB, cough, nausea/vomiting, constipation, abdominal pain, hematuria, LE edema, bleeding, muscle or joint pain/weakness.  10/16/24 - on Xtandi since early Jan 2024 for mCRPC, decreased to 80mg daily as of May 2024 for weight loss. Ongoing fatigue is stable. Ongoing hot flashes are tolerable. Appetite is "alright". Occasional diarrhea is improved, only 2 episodes of diarrhea over the past month. Bilateral percutaneous nephrostomy tubes in place, still passes some urine through his penis. Ongoing intermittent arthritic pains of the shoulders and knees are stable since before cancer diagnosis. Denies fever, chills, night sweats, headache, dizziness, chest pain, palpitations, SOB, cough, nausea/vomiting, constipation, abdominal pain, dysuria, hematuria, incontinence, LE edema, bleeding.   11/14/24: presents for follow up and management of metastatic CRPC on ADT + reduced dose enzalutamide in setting of weight loss and fatigue. His PSA has been noted to be rising over the last several visits from a manjinder of 8.13 to 15.3 at last visit on 10/16/24.   12/2/24: He presents for follow up and cycle 1 of docetaxel 75mg/m2 after his PSA was noted to rise further from 15.3 to 21.4. He has stopped enzalutamide.  He denies any new significant complaints since last visit.   12/23/24: He presents for follow up and cycle 2 of docetaxel 75mg/m2. He notes transient diarrhea and increased fatigue after cycle 1. Also notes alopecia and increased blood pressure when he takes steroid premedication. Denies fever, chills, night sweats, bony pain.  1/13/25:  He presents for follow up and cycle 3 of docetaxel 75mg/m2. He denies any significant change in symptoms. He has intermittent leaking from nephrostomy sites, increased on days of tx.  Notes continued fatigue. Also notes alopecia and increased blood pressure when he takes steroid premedication. Denies fever, chills, night sweats, bony pain.  2/3/25:  He presents for follow up and cycle 4 of docetaxel. He denies any significant change in symptoms.  Notes continued fatigue. Also notes alopecia and increased blood pressure when he takes steroid premedication. Denies fever, chills, night sweats, bony pain.   2/27/25: He presents for follow up and cycle 5 of docetaxel...now dose reduced to 60mg/m2 2/2 cytopenias and fatigue.  He denies any significant change in symptoms.  Notes continued fatigue. BP has been stable. Denies fever, chills, night sweats, bony pain. Last imaging in October. Will repeat after C6. PSA continues to trend downward.   3/20/25: He presents for follow-up and cycle 6 of docetaxel. Does not appreciate a symptomatic difference between the prior dose and his reduced dose he received last cycle. Endorses hot flashes. Denies pain. Nephrostomy tubes functional. Reports occasional blood from R tube. CBC with improvement in Hb since dose reduction. Has been losing weight although endorses good appetite and is "always thirsty".   4/10/25: He presents for follow-up and cycle 7 of docetaxel. Pt dose reduced to 60mg/m2 since C4 2/2 fatigue and cytopenias. Does not appreciate a symptomatic difference between the prior dose and his reduced dose he received last cycle. Endorses occasional hot flashes. Denies pain. Nephrostomy tubes functional. Less leakage since tube exchange. No bleeding. He continues on monthly xgeva. Denies jaw pain or issue with teeth. Pt noted with elevated /84. Pt reports that he did not take terazosin today because his BP was much lower at home. He states that "he only takes his medication when his morning BP is high". Pt asymptomatic. Pt weight noted to be trending downward. Lost 7 lbs since beginning of the year. Appetite reported as good. Weight loss not intentional.   5/1/25: Patient presents for a follow-up. Cycle 8 of docetaxel today. Reports interval development of sporadic diarrhea which he manages with imodum. Also reporting gas and urgency. Reports some episodes of higher volume of urine.  Mild fatigue, no fevers, chills, change in appearance of urine.

## 2025-05-04 NOTE — REVIEW OF SYSTEMS
[Fatigue] : fatigue [Joint Pain] : joint pain [Hot Flashes] : hot flashes [Fever] : no fever [Chills] : no chills [Night Sweats] : no night sweats [Chest Pain] : no chest pain [Palpitations] : no palpitations [Lower Ext Edema] : no lower extremity edema [Shortness Of Breath] : no shortness of breath [Cough] : no cough [Abdominal Pain] : no abdominal pain [Vomiting] : no vomiting [Constipation] : no constipation [Dysuria] : no dysuria [Incontinence] : no incontinence [Joint Stiffness] : no joint stiffness [Muscle Pain] : no muscle pain [Muscle Weakness] : no muscle weakness [Dizziness] : no dizziness [Easy Bleeding] : no tendency for easy bleeding [Easy Bruising] : no tendency for easy bruising [FreeTextEntry7] : diarrhea [de-identified] : occasional [de-identified] : hot flashes

## 2025-05-04 NOTE — PHYSICAL EXAM
[Restricted in physically strenuous activity but ambulatory and able to carry out work of a light or sedentary nature] : Status 1- Restricted in physically strenuous activity but ambulatory and able to carry out work of a light or sedentary nature, e.g., light house work, office work [Normal] : affect appropriate [de-identified] : (+) alopecia [de-identified] : anicteric  [de-identified] : Dry oral mucosa [de-identified] : no LE edema  [de-identified] : b/l nephrostomy tubes

## 2025-05-04 NOTE — REVIEW OF SYSTEMS
[Fatigue] : fatigue [Joint Pain] : joint pain [Hot Flashes] : hot flashes [Fever] : no fever [Chills] : no chills [Night Sweats] : no night sweats [Chest Pain] : no chest pain [Palpitations] : no palpitations [Lower Ext Edema] : no lower extremity edema [Shortness Of Breath] : no shortness of breath [Cough] : no cough [Abdominal Pain] : no abdominal pain [Vomiting] : no vomiting [Constipation] : no constipation [Dysuria] : no dysuria [Incontinence] : no incontinence [Joint Stiffness] : no joint stiffness [Muscle Pain] : no muscle pain [Muscle Weakness] : no muscle weakness [Dizziness] : no dizziness [Easy Bleeding] : no tendency for easy bleeding [Easy Bruising] : no tendency for easy bruising [FreeTextEntry7] : diarrhea [de-identified] : occasional [de-identified] : hot flashes

## 2025-06-06 NOTE — ASSESSMENT
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 He started abiraterone + prednisone in June 2020, dose was reduced to 500mg d/t transaminitis. Abiraterone discontinued late December 2023 for disease progression. Xtandi started Jan 2024.  He experienced transient benefit but appears to have progressing disease at this time as manifested by steadily rising PSA since 6/2024 and new lesions noted on CT imaging performed 10/30/24.   We discussed these results and that he will need an alternative therapy to control his disease. Standard of care options include taxane chemotherapy and Pluvicto. Given further increase in PSA to 21.4, we discussed the potential risks and benefits associated with docetaxel. He was amenable and signed informed consent.   He is tolerating treatment well to date. He is noted to have worsening anemia. To reduce likelihood of significant anemia, we reduced the dose of docetaxel to 60mg/m2. Has not been able to tell if symptoms are improved after dose reduction, but Hb is improved on CBC.    mCRPC: POD on tawanda/pred ->enzalutamide.  Now on docetaxel. Dose reduced to 60 mg/m2 since C4 2/2 fatigue/cytopenias - Feb 2024 [facility] CDx was negative for any actionable mutations, MSI high not detected, TMB 8 muts/Mb, ctDNA <1%.  -Continue Eligard q6mo injection with urology, given 12/12/24, next scheduled for 6/2025 -Scheduled for cycle 8 docetaxel next week, dose reduced to 60mg/m2 -trend PSA. Labs sent today  #diverticulat Abscess -S/P hospitalization. Treated with IV Cipro/Flagyl. Abscess not amenable to IR drainage. Pt discharged home on PO formulation which he has completed.  -Surgical follow up scheduled for 6/9 with Dr. Jenna Blanca: - He is off Ca + D supplementation d/t hypercalcemia.  - Monthly Xgeva inj started 5/15/24, continue  Cytopenia/anemia -Likely treatment related. No bleeding. Monitor hgb. CBC sent today/  -Docetaxel, dose reduced to 60mg/m2 C4  HTN -Concern for sporadic dosing of Terazosin. Encouraged f/u with pmd.   Instructed to contact our office with any new/worsening symptoms. Pt and family member educated regarding plan of care, all questions/concerns addressed to the best of my abilities and their apparent satisfaction.  RTC 3 weeks    [AdvancecareDate] : 1/2/24 [FreeTextEntry2] : Health care proxy form provided to pt 12/5/23, he still has at home. Instructed once again to bring the completed form to his next office visit.

## 2025-06-06 NOTE — HISTORY OF PRESENT ILLNESS
[Disease: _____________________] : Disease: [unfilled] [T: ___] : T[unfilled] [N: ___] : N[unfilled] [M: ___] : M[unfilled] [ECOG Performance Status: 1 - Restricted in physically strenuous activity but ambulatory and able to carry out work of a light or sedentary nature] : Performance Status: 1 - Restricted in physically strenuous activity but ambulatory and able to carry out work of a light or sedentary nature, e.g., light house work, office work [de-identified] : This 66-year-old male was first seen in consultation on July 20, 2016. In August of 2013 his PSA was 4.3. In September 30, 2013 it was 4.9. He underwent a biopsy in March 5, 2014 revealing Ade 7 (3+4) disease. He underwent external beam radiation therapy for a total dose of 8100 cGy in 45 fractions from May 25 and total July 31 of 2014. He subsequently experienced PSA failure and brachytherapy was considered. A CT scan was performed as part of that evaluation revealing a filling defect in the left ureter. In March 2016, he presented to the emergency room with a creatinine of 13.7 and potassium of 8.9. Bilateral nephrostomy tubes were placed. He was found to have GI bleeding and a colonoscopy revealed evidence of radiation proctitis. He has been on Lupron. 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 His initial staging was stage II, [id number].    The original pathology was reviewed prior to radiation. The left base revealed adenocarcinoma the prostate, Ade score 7 (3+4) involving 2 out of 2 cores, 40% of each core, with perineural invasion. The left mid zone revealed adenocarcinoma the prostate, Ade score 7 and (4+3) involving 50% of each of 2 cores, with perineural invasion noted. The left apex had 3 of 3 cores involved. One core was Ade 7 (3+4) involving 100% of the core. The other 2 cores had a Ade 6 (3+3) in less than 5% of 2 cores. Perineural invasion was identified as well.  Posttreatment biopsy revealed the presence of adenocarcinoma within the prostate gland. The PSA was 11 at the time of the biopsy.  Feels well at initial consultation. Started Lupron in January 2016 before the renal failure/obstruction. No pains. Fatigue at times. Appetite good, stable weight. Lost weight with illness in March, typical weight prior was 210, left hospital at 170. Weight stable. He was transfused multiple times. Some urine through penis recently. Has bilateral percutaneous nephrostomies. No blood, dysuria, no incontinence. Nocturia x 2-3 at this time. Right sided nephrostomy still has output, small amount in left bag. Due for change of tubes in September. Had radiation proctitis and has laser treatment. Sees GI in follow-up next month.   10/18/16...Had a colonoscopy, showed radiation colitis. No further rectal blood noted. Urine flow mostly from PCNs, some from penis. No dysuria, some  hematuria noted at times. Appetite is normal. No weight loss. No fatigue. Hot flushes occur daily. .  4/18/17...last scans 9/26.  Saw PCP and had PSA done, shows slight increase over manjinder. he was given ferrous sulfate to take for anemia. No pains. Urine flow is mostly thru PCNs. Still has occasional blood in the urine, noted in the bag. Has less volume form the left side.  Hot flushes occur a few a day, 15-20 minutes, occ sweats. Appetite is good, no weight loss. Some fatigue noted. No edema. Occ blood with stools, saw Dr Grossman in September, due for follow up. has not had major bleeding recently.   8/1/17...Surya states that he is feeling good, his urine flow is strong, slight increase, no pains anywhere, he gets hot flashes a "few  day, they are coming." He has some minor fatigue, He has percutaneous nephrostomies, He follows with Dr. Dick, no obvious hematuria, no breast tenderness.  11/14/17...Received Lupron from Dr Dick a few weeks ago. PSA was 0.02 on 8/1/17. He gets hot flashes a couple times a day, He has b/l nephrostomies, is able to pass urine through his penis. He denies hematuria, dysuria He states appetite is good. He gets some fatigue, He denies any pains.  4/17/18...Received Lupron from Dr Dick Jan 23, 2018. Not seen since November 17, due to illness. 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 Feels well. No pains noted. Some fatigue. Appetite is good. Reports that he has diarrheal for 2-3 months, not daily. He had loose stools this AM, took Imodium. Diarrhea occurs 1-2 days a week, the n stops. Some flatus, no cramps. No blood in the stool. Urine flow is via nephrostomy, minor amount thru penis, no dysuria, no incontinence. No blood in urine. Hot flushes most days.    Minor fatigue. Appetite normal.   7/17/18...On CAB. Feels "okay". No pains. Has bilateral nephrostomy tubes, due for change in August. No recent infections, occasionally passes urine via penis, no blood, no dysuria. Still has hot flushes, daily, more in the winter. Some fatigue, no physical impairment. Appetite is good. Weight stable. Diarrhea persists, on and off, not daily. Watery at times. takes Imodium on occasion. Occ small amount of blood with hard stools, secondary to radiation proctitis.   10/23/18...Feels "okay". No pains. Urine flow is minimal, most comes out of the nephrostomy tubes, nocturia -none. No blood, no dysuria. No incontinence. Hot flushes occur, day and night. Appetite is good, no weight. No dyspepsia, no nausea, no vomiting. Diarrheal stools occur episodically, every 2-3 weeks. No blood in the stool. Usually 1-2 BMs per day. Occasionally notes blood on toiler paper with hard stool. has RT proctitis.   2/5/19...Feels well, no pains. has hot flushes frequently, daily. Has some sweats with them at night. Appetite is good, no weight loss. Urine flow is minimal thru penis, nocturia does not occur. Most urine form PCNs, last catheter change January 2019, by IR at [facility]. Occ minor blood in the urine, when the catheter is loose. No back pains, no bone pains. Occ fatigue.   5/7/19...Had a calcium of 11.1 last visit. Called him and told to stop calcium and vit D. Repeat 3 weeks later was normal. Continues on Lupron and Casodex. Feels well. Hot flushes every day. He has some fatigue, unchanged. Appetite is good, no weight loss. Urine is minimal from penis, has bilateral PCNs. No bone pains. No edema.   8/8/19...Feels "all right", as a URTI. No pains noted. Hot flushes multiple time a day. Fatigue is present, mild. Urine flow is 'good', PCN bilateral, most flow form the right. Some flow thru penis, voids 1-2 times a day. NO blood, no dysuria. Due for PCN change next week. Appetite is good, no weight change.  10/29/19...Local failure prostate cancer, on CAB. Feels well. No pains., Urine flow is good. Has bilateral PCNs. Most of urine goes to PCNs. Occ small amount of blood in the bag. Voids 2 times a day. Hot flushes, daily, with sweats on occasion. has fatigue at times. Appetite is good, no weight loss. NO leg edema. No bleeding from the RT proctitis recently  1/28/20...Stopped bicalutamide after last visit. Last seen 3 months ago. PSA on 1/7 was 0.27, thus continued to rise somewhat. NO bone pains. Hot flushes daily, occ sweats. Appetite is good, no weight loss. No fatigue of note. Urine flow is good, but most flow is into the PCNs. Last change of PCNs was December after he had some leaking on one side. No hematuria, no dysuria. No incontinence.   5/6/20...Verbal permission granted for telephone services by patient, Surya Cervantes, on 5/6/20 at 1:35 PM.   Fells well. No pains noted. Nephrostomy tubes in place, replaced last week. Appetite is good, no weight loss. Hot flushes daily. He has chronic fatigue complaints. No leg edema noted. Urine through penis is minimal, only when tubes become clogged. No blood in the urine. No nausea, no vomiting. Has diarrhea at times, once every 2-3 weeks. No back pains noted. No fevers, no chills., No cough, no sputum.   6/17/20...Verbal permission for telephone contact was granted by the patient, Surya Cervantes, on June 17, at 4 PM. Feels "all right". NO pains. Hot flushes, daily, occ minor sweats. Fatigue is present, rated mild, occasional naps. Appetite is good, no weight loss. No edema. Urine flow is minimal from penis, has bilateral PCNs. No blood. No incontinence. No recent infections. No cough NO CHEN. No F/C. Helps in care of his mother, watches TV, not very active. Jeana from [employer].   7/29/20...Verbal permission for telephone contact was granted by the patient, Surya Cervantes, on July 29, 2020 at 3:10 PM.  Started tawanda/pred on June 16, 2020. Feels  "pretty good". He was having diarrhea prior and he no longer had diarrhea.   No pains at this time. No edema. No headaches. Checks BP on occasion.  BPs have been "normal", he will check often. Urine flow "about the same",. Most urine comes from PCNs. No nocturia, no  hematuria, no dysuria. Appetite is normal, thinks he may have lost a few pounds. No cough, No CHEN. NO F/C. Hot flushes occur daily. No change. Mild fatigue.  8/26/20...On abiraterone and prednisone since June, PSA dropped significantly. taking meds correctly. Feels 'all right", no pains. Fatigue is present, as before, "not terrible", takes naps. Slightly more fatigue than before. Appetite is good, but lost weight. eats with his mother, she eats less and so does he. No nausea, no vomiting. No headaches, no edema of note. Most of urine comes from PCNs, Urine from penis is minimal, does not get up at night. No blood, no dysuria. No incontinence. No blood in the PCN bags.  Has leg cramps daily, particularly in the AM. Dr Angulo administers Lupron, due for Rx on 9/1/20.   9/22/20...He is seen in the office today in follow-up. He's been on abiraterone and prednisone since June. He had elevated transaminases on September 3 and his abiraterone was subsequently held. He will have repeat blood work done today after today's visit. He states that he feels "all right." His appetite is stated to be "all right." His weight has been stable. He denies dyspepsia, nausea or vomiting. He denies constipation or diarrheal stools. There is no cough or shortness of breath. He denies skin rashes or pruritus. There are no complaints of pains at this time. Fatigue is present but it does not impair his daily activities. He takes occasional naps during the day. He denies arthralgias or myalgias. There are no headaches or dizziness. He has bilateral percutaneous nephrostomy tubes draining yellow urine. There is no blood in the percutaneous nephrostomy bags. Most of the urine comes from the percutaneous nephrostomy tubes with minimal amount of urine coming out through the penis. There is no nocturia. There is no hematuria or dysuria. There is no incontinence. There is no edema in the extremities. He has occasional hot flashes but felt it has improved these past few days. He denies fevers or chills. He infrequently checks his blood pressure at home. He would get blood pressure readings ranging from 150s-160s systolic over 80s-90s diastolic. He remains independent in his activities of daily living.   10/27/20...Verbal permission for telephone services granted by the patient,  Surya Cervantes on October 27, 2020 at 3:48 PM.  Stared abiraterone in June. Feels "pretty good". NO increased edema. No headaches. Hot flushes are less. Appetite is good, no weight loss. Has PCNs, has urine form the penis at times. NO blood in the urine. No dysuria. No nocturia. No incontinence. No bone pains noted. Fatigue  RTs, takes a nap at times. Cares for his elderly mother at home.   11/25/20...Verbal permission for telephone services granted by the patient,  Surya Cervantes on November 25, 2020 at 1:50 PM On 1/2 dose tawanda due to transaminases. No pains, feels "all right". Some fatigue. Urine flow form penis in minimal, has bilateral PCNs. Occasional small amount of blood in the right PCN tube. Appetite is good, no weight loss. No cough, no CHEN. Hot flushes daily. No headaches, no edema. BP monitored at home, 151/89, pulse 95.   12/23/20...Verbal permission for telephone services granted by the patient,  Surya Cervantes on December 23, 2020 at  1:32 PM.  Feels well. No pains noted. Urine flow RTS. Has PCNs, minimal urine via penis on occasion. No blood in urine or tubes of note. Appetite is good, no weight loss. NO edema of the legs. NO headaches, no dizziness. No cough, no CHEN. No F/C. Hot flushes occur daily, less often. Due Lupron next month from Dr Angulo. Mild fatigue, take a nap. Independent in ADLs, cares for his mother. BP at home 138/90, pulse 71. Left arm 147/92  1/28/21...Verbal permission for telephone services granted by the patient,  Surya Cervantes on January 28, 2021 at 315 PM.  Surya reports that he has no pains.  There is no significant urine within his blood.  In the interim, he had to have the left percutaneous nephrostomy tube changed as there was pus present.  They had some difficulties apparently.  The tube was clogged and he was unable to change it over a guidewire.  He was not treated with any antibiotics.  He does have some urine from the penis at times.  There is no fevers or chills.  His appetite is good and there is no weight loss.  He reports that his weight is 190 pounds today.  He says his blood pressure was 146/92 in the left arm and 145/85 on the right arm.  The pulse rate was 75.  He reports no edema or headaches.  There are some hot flushes.  2/25/21...Verbal permission for telephone services granted by the patient,  Surya Cervantes on 2/25/21 at 3:10 PM Feels "all right". NO pains. He was having issues with PCNs clogged, had to have removal and reinsertion. It was the right side this week, done on Tuesday. Told to flush it daily rather than every 3 days. Appetite is good, no weight loss, no edema. No cough, no CHEN. BP is monitored, right side 139/84 today, left 134/82, pulse 79. No headaches, no significant fatigue, does nap on occasion. NO leg weakness. No N/V/D/C. Minimal urine from penis. There was more flow when the dysfunction occurred. NO blood in the urine, no dysuria.   4/7/21...  10/26/21....Last evaluation was 4/7/21, by telephone. Completed antibiotics. Was in rehab for 6 weeks. Strength is good, no pains. Appetite is good, gained some weight. urine flow is good, no incontinence, very little urine from penis, mostly from the tubes. No headaches, no dizziness, no balance issues, No falls. Hot flushes occur. Lives with mother, helps her. No edema at this time.   From neurosurgery, this summary of events....70 yo Male w/ PMHx of HTN, prostate CA (on chemo, s/p radiation), b/l nephrostomy tubes, presented  to [facility] ED on 6/14/21 with weakness and confusion. In ED foulurine in b/l nephrostomy bags noted, tachycardic, and hypotensive. s/p IVF and levo drip for septic shock, developed fluid overload with pitting edema and decreased  EF. Imaging of the spine concerning for discitis and osteomyelitis c-spine with T1 inflammation/signal changes.   Hospital coursed remarkable for BC + Klebsiella and strep anguinous. Started on vanco/zosyn 6/14. As per ID switched to ceftriaxone 2 g on 6/17. Hospitalization c/b ASHLEY on CKD with right hydronephrosis. IR consulted- no intervention, both nephrostomy tubes flushing adequately. Nephrology  consulted, started on stress dose steroids/hydrocortisone 50 mg q6 and Midodrine. CTAP showed Sludge in gallbladder.  Patient  underwent C6-7 corpectomy and posterior fusion C4-T2 on 6/29/21. Hospital course c/b tachycardia, fever with low O2 sats. Chest CT negative for  PE, lower extremity Dopplers negative. s/p PICC, discharged to inpatient rehab on 7/12/21 and then was discharged home after completion of rehab.   Last Eligard was August with Dr Angulo, 30 mg dose.   11/30/21...on tawanda/pred since June 2020. Feels  "all right". Has hot flushes and fatigue. Does not prevent activities. He falls asleep easily if he sits in a chair. Independent in ADLs, cares for his mother. No infections recently, No F/C. No recent adventures with with his PCNs. No blood in urine, occ urine via penis. Appetite is good, gained 2.2 kilos. Edema decreased. No chest pain/pressure. No cough/ CHEN. No headaches, no dizziness. No N/V/D/C.   1/11/22....telehealth today. Feels  "all right". No pains of note. t flushes daily. Has some fatigue as well. No edema noted. NO cough/CHEN No chest pain/pressure. No F/C. Appetite is good, no weight loss. Weighs 176. Checks BP at home, 142/98 RP 72 today, L arm, 142/91. Takes terazosin at night. Sees PCP in March. Needs to call PCP for tighter BP control. NO headaches, no dizziness. No F/C. No hematuria, has bilateral PCNS, has some small amount of urine from the penis. Had change of PCNs last week. Independent in ADLs.   2/8/22 - telehealth visit today. Continues on abiraterone 500mg daily + prednisone. Reports his BP today was 143/94 with HR of 74. Overall feeling "alright". Moderate fatigue, takes naps during the day. Occasional hot flashes. Appetite is good, weight today is 180lbs, he attributes recent weight gain to eating better after being discharged from the hospital. Mild ptosis of left eyelid comes and goes and has reportedly been stable for his "whole life". Bilat percutaneous nephrostomy tubes in place, no hematuria. Ongoing mild BLE edema is reportedly stable. Ongoing intermittent arthritis pains of left knee and right shoulder are stable. Denies fever, chills, headache, dizziness, balance issues, eye pain/problems, mucositis/odynophagia, chest pain, palpitations, SOB, cough, nausea/vomiting, diarrhea/constipation, abdominal pain, hematuria, rash/pruritus, neuropathy, bleeding, weakness, anxiety/depression.  3/17/22...tawanda/pred started mid June 2020. PSA was 11.6 at start. Feels "all right". No pains. No major issues with the PCNs, having a change done next week. Appetite is good, no weight loss. No cough, No CHEN. Some leg edema. No chest pain/pressure. Urine from penis is relatively little. No dysuria, no blood. has calcium oxalate excretion, which causes his tubes to be clogged. Notes some bruising. NO headaches, no dizziness, no balance issues. No F/C. No N/V/C, some minor diarrheal stools at times. Takes Imodium as as needed. Fatigue is present at times, has hot flushes occur multiple times a day.   4/20/22 - continues on abiraterone (500mg daily) + prednisone since June 2020 for mCRPC. Overall feeling "alright", ongoing mild fatigue is stable. Intermittent hot flashes are tolerable. Mild night sweats during the summer when it gets warmer. Ongoing mildly productive cough which he's had for "a long time". Occasional diarrhea that doesn't last more than a day. Bilateral percutaneous nephrostomy tubes in place, passing minimal urine from his penis. Occasional hematuria especially from right nephrostomy bag, clear today. Ongoing mild BLE edema. Ongoing chronic left knee swelling and pain 2/2 arthritis. Ongoing right shoulder pain 2/2 arthritis as well. Denies fever, chills, headache, dizziness, balance issues, eye pain/problems, mucositis/odynophagia, chest pain, palpitations, SOB, nausea/vomiting, constipation, abdominal pain, rash/pruritus, bleeding, muscle pain/weakness  5/24/22 - continues on abiraterone (500mg daily) + prednisone since June 2020 for mCRPC. BP today is 164/95, asymptomatic. BP at home has been 150s/80-90s. On occasion the DBP has been >100. Overall feeling "alright", ongoing fatigue is stable. Ongoing intermittent hot flashes. Appetite is good. Vision is changing in his left eye, he has an appt with [facility] this Thurs. Bilateral nephrostomy tubes in place, occasional hematuria at times resolves after 2-3 days. Ongoing trace edema of BLEs. Intermittent pruritus around nephrostomy tube dressings. Denies fever, chills, night sweats, headache, dizziness, balance issues, mucositis/odynophagia, chest pain, palpitations, SOB, cough, nausea/vomiting, diarrhea/constipation, abdominal pain, dysuria, incontinence,  rash, neuropathy, bleeding, muscle or joint pain/weakness.   6/21/22 - continues on abiraterone, half dose + prednisone since June 2020 for mCRPC. PSA 11.6 at start of treatment in June 2020, manjinder PSA at o.o5 August 2021, slow rise, recent PSA in May 2022 was 0.17.  feels  "all right". No pains noted. Has bilateral PCNs, changed last week. No infections, no F/C. Some fatigue, always, no worse. Appetite is good, no weight loss. NO headaches, no dizziness, no balance issues. Some edema noted, as before. No chest pain/pressure. NO bone pains. No N/V/D/C. Small amounts of urine via the penis. Hot flushes daily, multiple times. No cough, no CHEN.   7/28/22 - continues on abiraterone 500mg + prednisone since June 2020 for mCRPC. Overall feeling "alright", ongoing mild fatigue for which he occasionally takes a nap once a day. Ongoing hot flashes are tolerable. Appetite is good. Occasional cough. Bilateral percutaneous nephrostomy tubes in place passing most of the urine through the bags but still passing minimal urine via penis, no nocturia. Trace edema of BLEs. Notes intermittent cramping of hands which may be arthritis as it improves with movement. Has ongoing intermittent right buttock pain radiating down to the knee, present for the past year, pain is worse with sitting for a long time, improves with changing positions, max pain is 3/10 not requiring medication. Denies fever, chills, night sweats, headache, dizziness, balance issues, eye pain/problems, mucositis/odynophagia, chest pain, palpitations, SOB, nausea/vomiting, diarrhea/constipation, abdominal pain, dysuria, hematuria, incontinence, rash/pruritus, bleeding, weakness.   10/11/22 - continues on abiraterone 500mg daily + prednisone since June 2020 for mCRPC. Neurosurgical notes reviewed...."S/P cervical spinal fusion, 70 year old male 15 months pos op C6-7 corpectomy and posterior fusion C4-T2 on 6/29/21 with Dr. Tiffanie Martinez". Now seeing Dr Resendiz. Overall, feels "all right". No pains noted. urine flow is "fine", no incontinence, most urine still out PCNs, some from the penis. No blood, no dysuria. Hot flushes daily.  Some fatigue, RTS. Appetite is good, no weight loss. No headaches, dizziness, no balance issues, No paresthesias. Occ cough, sputum is white. No CHEN. Tubes to be changed next week. No N/V/D/C.  Some edema , improved. No chest pain/pressure  11/9/22 - continues on abiraterone 500mg daily + prednisone since June 2020 for mCRPC which he is tolerating well. Pt reports feeling generally well. No new complaints. Bilateral nephrostomy tubes in place, denies hematuria . Ongoing trace edema of BLEs. Intermittent pruritus around nephrostomy tube dressings. Denies fever, chills, night sweats, headache, dizziness, balance issues, mucositis/odynophagia, chest pain, palpitations, SOB, cough, nausea/vomiting, diarrhea/constipation, abdominal pain, dysuria, incontinence, rash, neuropathy, bleeding, muscle or joint pain/weakness. Appetite reported as good. No changes in medications  12/7/22 - continues on abiraterone 500mg daily + prednisone since June 2020 for mCRPC.  gets his Eligard 45 tomorrow form Dr Angulo. feels "all right". No pains noted except for arthrtic complaints. Appetite is okay< dropped a few pounds. usual edema, no change. No headaches, NO dizziness. Most of the urine comes from PCNs, changed every 6 weeks due to prior infections. NO fevers, no chills. Hot flushes occur, 1-2 times a day. Notes a bit more urine thru penis lately. Occ cough, no CHEN. No chest pain/pressure/palpitations. NO N/V/D/C. Mild fatigue. Independent in ADLs.   1/4/23 - continues on abiraterone 500mg daily + prednisone since June 2020 for mCRPC. Overall feeling "alright", ongoing fatigue is stable. Ongoing hot flashes are tolerable. Appetite is "ok". Ongoing occasional productive cough is stable. Bilateral perc nephrostomy tubes in place, continues to pass some urine through the penis, no nocturia. Denies fever, chills, night sweats,headache, dizziness, balance issues, eye pain/problems, mucositis/odynophagia, chest pain, palpitations, SOB, nausea/vomiting, diarrhea/constipation, abdominal pain, dysuria, hematuria, incontinence, LE edema, rash/pruritus, bleeding, muscle or joint pain/weakness.   2/13/23..... continues on abiraterone 500mg daily + prednisone since June 2020 for mCRPC. treated with cephalexin and developed rash, red skin, pruritus. Chart labeled as allergy. Hospitalization was brief. sudden onset of symptoms, Hd diarrheal stools as well. Feels  "pretty good", except for residual pruritus. No pains noted.  Has tube change set for next week. Appetite has retuned. No N/V. Diarrhea resolved, NO taste alteration. No headaches, no dizziness. had some edema. resolved. No chest pain/pressure/palpations.  has his usual fatigue and hot flushes. Cough, chronic, with clear sputum. No CHEN/SOB. No fevers of chills since discharge  Hospital Course:  Discharge Date	01-Feb-2023  Admission Date	29-Jan-2023 17:52  Reason for Admission	Syncope  Hospital Course	  71 yo M w/ HTN, gout, prostate cancer s/p b/l nephrostomy tubes, on abiraterone, admitted w/ sepsis 2/2 UTI, improved on antibiotics.  Sepsis secondary to UTI.  was febrile, tachycardic in the ED, met criteria for sepsis on admission  - U/A w/ +nitrite, large LE, prior cultures with klebsiella and E. coli  - current Ucx >3 organisms suggestive of collection contamination  - received IV zosyn (1/29/23-2/1/23),  Bcx negative, discharge on oral cephalexin to complete 10 days for complicated UTI (end date 2/7/23)  - sepsis resolved (currently afebrile, without leukocytosis, tachycardia resolved)  - IR consulted nephrostomy tubes in position and draining well, no indication for exchange at this time.   Gastroenteritis.   had reported nausea/vomiting/diarrhea after eating fried rice w/ beef  - s/p IV fluids, now resolved.   Syncope.   reports syncopal episode likely 2/2 dehydration from gastroenteritis  - s/p IV fluids, EKG sinus rhythm, troponin indeterminate but no increased  - reports symptoms resolved, ambulating around unit independently (advised call don't fall).   3/21/23..... continues on abiraterone 500mg daily + prednisone since June 2020 for mCRPC. treated with cephalexin and developed rash, red skin, pruritus. Chart labeled as allergy. Overall, he feels "all right". No pains of note. Appetite is good, no weight loss. No edema. No chest pain/pressure/palpitations. some cough, no CHEN. Hot flushes daily. Fatigue is present.  Does yoga, runs in place. No HA, no dizziness, no falls. Independent in ADLs. no fevers, no chills. Minimal urine from penis, no dysuria. PCNs are changed every 6 weeks, no blood in tubes. No N/V/D/C.   4/17/23.....  on abiraterone 500mg and prednisone since June 2020 for mCRPC. Prior transaminitis led to decrease in dose. "doing okay". Minor discomfort on left side near the PCN. Has change every 6 weeks at this time. Appetite is good, no weight loss. No N/V/D/C. Has some urine form the penis, noted if PCNs pinch. No blood in the urine, no hematuria. Hot flushes as before, about 3 times a day.  fatigue RTS. No cough, no CHEN, No fevers, no chills. No edema, no chest pains,pressure/palpitations  Exercises every AM.   5/16/23....continues on abiraterone 500mg and prednisone since June 2020 for mCRPC. Prior transaminitis led to decrease in dose. Eligard next month with Dr Angulo. Overall, feels "all right". No pains noted. Appetite is good, no weight loss. No N/V/D/C. Urine from penis on occasion, empties bladder before bed. No hematuria, no dysuria. No fevers, no chills. PCNs were changed last week. No headaches, no dizziness, no balance issues.  Exercises daily, walking and running. Occ cough, no CHEN. No chest pain/pressure/palpitations. Hot flushes remains. No fevers, no chills  6/12/23.... on abiraterone 500mg and prednisone since June 2020 for mCRPC. Prior transaminitis led to decrease in dose. Mara with Dr Angulo. Feels "well". NO pains, except for radicular pain in right thigh, present for about 6 weeks, constant. uses a topical, icy-hot. Does not awaken him, better when he is lying down, aggravated by ambulation. Appetite is normal. No weight loss. NO fevers, no chills. NO fatigue. No headaches, no dizziness, no paresthesias. No cane. No falls. No cough, no CHEN. NO N/V/D/C. Urine from PCNs. Some from the penis, no hematuria, no dysuria. No nocturia. Hot flushes daily. No edema. NO chest pain/pressure/palpitations. No back pains. Independent in ADLs  7/18/23 - continues on abiraterone + prednisone (500mg daily d/t transaminitis) for mCRPC since June 2020. Ongoing fatigue after periods activity, naps once a day. Ongoing hot flashes are tolerable. Appetite is good. Occasional cough. Bilateral perc nephrostomy tubes in place, scant blood if the tubing moves around but not persistent. Ongoing pain in right buttock radiating down posterior thigh/leg, no pain with lying down, pain is worse with sitting for a prolonged time and improves with walking, max pain is 8/10. Not taking any pain medication as the pain eases up when he gets up and walks around.   8/16/23 - continues on abiraterone + prednisone (500mg daily d/t transaminitis) for mCRPC since June 2020. Overall feeling "alright", ongoing fatigue is stable. Ongoing frequent hot flashes, tolerable. Appetite is "alright". Ongoing occasional cough. Perc nephrostomy tubes exchanged 2wks ago, occasional transient hematuria which he attributes to accidentally pulling on the tubes. Ongoing stable intermittent right buttock pain radiating down posterior thigh down to the calf, improves with movement and worse with sitting for a long time, max pain is 5/10. Using a topical cream called "[place] Dream" which has been helpful.   9/20/23 - continues on abiraterone + prednisone (500mg daily d/t transaminitis) for mCRPC since June 2020. last PCN change was last week. On a 6 week change protocol. Feels "not bad", chronic leg pains, form right buttock, down the leg. Passing urine via penis, very small amounts. No blood, no dysuria. No nocturia. No incontinence, voids about 2 times a day. Hot flushes, every day, no sweats. Fatigue is present, naps, feels refreshed afterwards. Does yoga, stretching, walking, lifts some weights, daily basis. Appetite is good, no weight Kenji Occ cough, no CHEN. No edema, no chest pain/pressure.   10/31/23 - continues on abiraterone + prednisone (500mg daily d/t transaminitis) for mCRPC since June 2020. Overall feeling "alright", ongoing fatigue is stable. Occasional hot flashes. Appetite is "alright". Perc nephrostomy tubes in place, passing minimal urine through his penis. Ongoing pain in right buttock that radiates down the posterior leg, worse in the morning when first waking up and worse with sitting for a prolonged period, improves/resolves with activity. Denies fever, chills, night sweats, headache, dizziness, balance issues, chest pain, palpitations, SOB, cough, nausea/vomiting, diarrhea/constipation, abdominal pain, dysuria, hematuria, incontinence, LE edema, bleeding.   12/5/23 - continues on abiraterone + prednisone (500mg daily d/t transaminitis) for mCRPC since June 2020. Overall feeling well, some fatigue but remains active and exercising regularly. Ongoing hot flashes are tolerable. Appetite is good. Bilateral percutaneous nephrostomy tubes in place, no hematuria. Ongoing right buttock pain that radiates down the posterior leg, worse in the morning and improves with activity, max pain is 4-5/10, declines referral to PM&R. Denies fever, chills, night sweats, headache, dizziness, balance issues, chest pain, palpitations, SOB, cough, nausea/vomiting, diarrhea/constipation, abdominal pain, LE edema, bleeding.  1/2/24 - on abiraterone + prednisone since June 2020, discontinued 12/26/23 for POD seen on 12/21/23 bone scan. Feeling well, no significant fatigue. Remains active and doing exercises daily. Occasional hot flashes are tolerable. Bilateral percutaneous nephrostomy tubes in place, no hematuria. Ongoing right buttock pain that radiates down the posterior leg, worse in the morning and improves with activity, max pain is 7-8/10, pain is stable. Denies fever, chills, night sweats, headache, dizziness, balance issues, chest pain, palpitations, SOB, cough, nausea/vomiting, diarrhea/constipation, abdominal pain, LE edema, bleeding.   2/14/24 - Xtandi started early Jan 2024 for mCRPC. Over the past week he's been very fatigued. Ongoing hot flashes. Appetite is decreased and not eating as much as he used to, weight is down 11lbs over the past 6wks. Stable cough since before start of Xtandi. Percutaneous nephrostomy tubes in place, no hematuria. Notes some pains in the hands, knees, and ankles which he attributes to arthritis. Ongoing pain in right buttock radiating down posterior leg, pain is worse in the AM when first waking up and improves with activity/walking, pain waxes and wanes, max pain is 7/10 but not taking any medication for this. Denies fever, chills, night sweats, headache, dizziness, balance issues, chest pain, palpitations, SOB, nausea/vomiting, diarrhea/constipation, abdominal pain, LE edema, bleeding.   3/14/24 - continues on Xtandi since early Jan 2024 for mCRPC. Overall feeling ok, fatigue is a little improved which he believes is because his BP hasn't been as low as it was in the past. BP at home has been 120's/70-80's in the mornings. Ongoing hot flashes are not as intense. Appetite is decreased, weight is down 7lbs over the past month, but overall down 18lbs over 2 months. Urine from bilateral percutaneous nephrostomy tubes is "good", no hematuria. Ongoing pain in right buttock down the posterior leg, pain is worse with sitting for a prolonged period, improves with getting up and moving around, not interfering with his sleep, not needing any medication for pain. Denies fever, chills, night sweats, headache, dizziness, balance issues, chest pain, palpitations, SOB, cough, nausea/vomiting, diarrhea/constipation, abdominal pain, LE edema, bleeding.  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 NO AAW benefit. Started tawanda/pred mid June 2020, discontinued 12/26/23 for disease progression. Xtandi started early Jan 2024, decreased to 80mg daily in May 2024 d/t weight loss. Switched to docetazel in 12/2024 due to PSA progression. [de-identified] : 4/16/24 - continues on Xtandi since early Jan 2024 for mCRPC. Excess lambda light chains on last blood work, no M-spike. Feels well. no Pains. Has to some fatigue, naps during the day. Appetite is "good", No N/V/D/C. No cough, no CHEN. NO chest pain/pressure. No headaches, no dizziness. No balance issues, minimal, walks with a cane. No falls. Urine  flos via tubes, no recent infections. NO blood in urine. Some minor urine amounts from penis. No nocturia, no dysuria. Hot flushes occur, less than before, 1-2 times a day. No edema noted. Independent in ADLs.  CDX done, no targets  5/15/24 - continues on Xtandi since early Jan 2024 for mCRPC. Feeling "alright". Ongoing fatigue, naps 1-2x/day. Occasional hot flashes. Decreased appetite, not eating as much as before, drinking 1-2 Ensure per day, weight is down 5lbs over the past month. Bilateral percutaneous nephrostomy tubes in place, no hematuria. Ongoing arthritic pains of the shoulders and hands at times. Denies fever, chills, night sweats, headache, dizziness, balance issues, chest pain, palpitations, SOB, cough, nausea/vomiting, diarrhea/constipation, abdominal pain, LE edema, bleeding.   6/12/24 - continues on Xtandi since early Jan 2024 for mCRPC, decreased to 80mg daily as of May 2024 for weight loss. Ongoing fatigue is stable. Ongoing hot flashes. Appetite is "a little better". Urine from nephrostomy tubes is clear yellow, no hematuria. Denies fever, chills, night sweats, headache, dizziness, balance issues, chest pain, palpitations, SOB, cough, nausea/vomiting, diarrhea/constipation, abdominal pain, LE edema, bleeding, muscle or joint pain/weakness.  7/11/24 - on Xtandi since early Jan 2024 for mCRPC, decreased to 80mg daily as of May 2024 for weight loss. Patient accompanied by brother, reports to be feeling well, appetite slowly improving, continues to have nephrostomy tubes in places, last changed 23 weeks ago draining clear yellow urine. Denies fever, chills, headaches, chest pain, sob, abdominal pain/back pain.   8/14/24 - on Xtandi since early Jan 2024 for mCRPC, decreased to 80mg daily as of May 2024 for weight loss. Overall feeling well, ongoing fatigue is stable. Ongoing hot flashes.  Appetite is stable, weight is up a couple lbs since last visit. Diarrhea yesterday "all day" with >6-7 episodes of soft/loose stool, he took PRN Imodium yesterday, no more stool today, unsure if it was r/t something he ate. Urine from nephrostomy tubes is "good", no hematuria. Denies fever, chills, night sweats, headache, dizziness, chest pain, palpitations, SOB, cough, nausea/vomiting, constipation, abdominal pain, LE edema, bleeding, muscle or joint pain/weakness.  9/17/24 - on Xtandi since early Jan 2024 for mCRPC, decreased to 80mg daily as of May 2024 for weight loss. Feeling "alright", stable fatigue. Ongoing hot flashes are tolerable. Appetite is stable. Having intermittent diarrhea approx every other week with approx 3 episodes when it occurs but resolves with PRN Imodium 1-2 tabs, having normal BMs in between, unsure if diarrhea is r/t eating spinach. Bilateral nephrostomy tubes in place. Denies fever, chills, night sweats, headache, dizziness, chest pain, palpitations, SOB, cough, nausea/vomiting, constipation, abdominal pain, hematuria, LE edema, bleeding, muscle or joint pain/weakness.  10/16/24 - on Xtandi since early Jan 2024 for mCRPC, decreased to 80mg daily as of May 2024 for weight loss. Ongoing fatigue is stable. Ongoing hot flashes are tolerable. Appetite is "alright". Occasional diarrhea is improved, only 2 episodes of diarrhea over the past month. Bilateral percutaneous nephrostomy tubes in place, still passes some urine through his penis. Ongoing intermittent arthritic pains of the shoulders and knees are stable since before cancer diagnosis. Denies fever, chills, night sweats, headache, dizziness, chest pain, palpitations, SOB, cough, nausea/vomiting, constipation, abdominal pain, dysuria, hematuria, incontinence, LE edema, bleeding.   11/14/24: presents for follow up and management of metastatic CRPC on ADT + reduced dose enzalutamide in setting of weight loss and fatigue. His PSA has been noted to be rising over the last several visits from a manjinder of 8.13 to 15.3 at last visit on 10/16/24.   12/2/24: He presents for follow up and cycle 1 of docetaxel 75mg/m2 after his PSA was noted to rise further from 15.3 to 21.4. He has stopped enzalutamide.  He denies any new significant complaints since last visit.   12/23/24: He presents for follow up and cycle 2 of docetaxel 75mg/m2. He notes transient diarrhea and increased fatigue after cycle 1. Also notes alopecia and increased blood pressure when he takes steroid premedication. Denies fever, chills, night sweats, bony pain.  1/13/25:  He presents for follow up and cycle 3 of docetaxel 75mg/m2. He denies any significant change in symptoms. He has intermittent leaking from nephrostomy sites, increased on days of tx.  Notes continued fatigue. Also notes alopecia and increased blood pressure when he takes steroid premedication. Denies fever, chills, night sweats, bony pain.  2/3/25:  He presents for follow up and cycle 4 of docetaxel. He denies any significant change in symptoms.  Notes continued fatigue. Also notes alopecia and increased blood pressure when he takes steroid premedication. Denies fever, chills, night sweats, bony pain.   2/27/25: He presents for follow up and cycle 5 of docetaxel...now dose reduced to 60mg/m2 2/2 cytopenias and fatigue.  He denies any significant change in symptoms.  Notes continued fatigue. BP has been stable. Denies fever, chills, night sweats, bony pain. Last imaging in October. Will repeat after C6. PSA continues to trend downward.   3/20/25: He presents for follow-up and cycle 6 of docetaxel. Does not appreciate a symptomatic difference between the prior dose and his reduced dose he received last cycle. Endorses hot flashes. Denies pain. Nephrostomy tubes functional. Reports occasional blood from R tube. CBC with improvement in Hb since dose reduction. Has been losing weight although endorses good appetite and is "always thirsty".   4/10/25: He presents for follow-up and cycle 7 of docetaxel. Pt dose reduced to 60mg/m2 since C4 2/2 fatigue and cytopenias. Does not appreciate a symptomatic difference between the prior dose and his reduced dose he received last cycle. Endorses occasional hot flashes. Denies pain. Nephrostomy tubes functional. Less leakage since tube exchange. No bleeding. He continues on monthly xgeva. Denies jaw pain or issue with teeth. Pt noted with elevated /84. Pt reports that he did not take terazosin today because his BP was much lower at home. He states that "he only takes his medication when his morning BP is high". Pt asymptomatic. Pt weight noted to be trending downward. Lost 7 lbs since beginning of the year. Appetite reported as good. Weight loss not intentional.   6/5/25: Since last visit Mr. Morales was admitted to the hospital for diverticular abscess formation. He was treated with IV Cipro/Flagyl. Abscess was not amenable to IR drainage. He was managed conservatively. Repeat CT imaging prior to discharge revealed decreased size of abscess. He was discharged home on PO cipro/Flagyl. He reports he is "ok"He is scheduled for Surgical f/u on 6/9. He denies fever/pain. Appetite poor.

## 2025-06-06 NOTE — PHYSICAL EXAM
[Restricted in physically strenuous activity but ambulatory and able to carry out work of a light or sedentary nature] : Status 1- Restricted in physically strenuous activity but ambulatory and able to carry out work of a light or sedentary nature, e.g., light house work, office work [Normal] : well developed, well nourished, in no acute distress [de-identified] : ill appearing (+) alopecia [de-identified] : anicteric  [de-identified] : Dry oral mucosa [de-identified] : 1+ LE edema  [de-identified] : b/l nephrostomy tubes

## 2025-06-06 NOTE — REVIEW OF SYSTEMS
[Fatigue] : fatigue [Joint Pain] : joint pain [Hot Flashes] : hot flashes [Fever] : no fever [Chills] : no chills [Night Sweats] : no night sweats [Chest Pain] : no chest pain [Palpitations] : no palpitations [Lower Ext Edema] : no lower extremity edema [Shortness Of Breath] : no shortness of breath [Cough] : no cough [Abdominal Pain] : no abdominal pain [Vomiting] : no vomiting [Constipation] : no constipation [Dysuria] : no dysuria [Incontinence] : no incontinence [Joint Stiffness] : no joint stiffness [Muscle Pain] : no muscle pain [Muscle Weakness] : no muscle weakness [Dizziness] : no dizziness [Easy Bleeding] : no tendency for easy bleeding [Easy Bruising] : no tendency for easy bruising [de-identified] : occasional [de-identified] : hot flashes

## 2025-06-12 NOTE — HISTORY OF PRESENT ILLNESS
[FreeTextEntry1] : Patient is a 72-year-old man comes in today for a history of metastatic prostate cancer  Currently receiving Docetaxel chemotherapy. with Oncology Brother is present at visit today.  6/5/25: Since last visit Mr. Morales was admitted to the hospital for diverticular abscess formation. He was treated with IV Cipro/Flagyl. Abscess was not amenable to IR drainage. He was managed conservatively. Repeat CT imaging prior to discharge revealed decreased size of abscess. He was discharged home on PO cipro/Flagyl. He is scheduled for Surgical f/u on 6/9. He denies fever/pain. He reports poor appetite.  Bilateral nephrostomy tube due for exchange at the end of December 2024. Urine remains clear.  PSA hx PSA 06/25/2025 19.30 ng/mL PSA 12/2/2024 28.7 ng/mL PSA 11/14/2024 21.4 ng/mL PSA 10/16/2024 15.3 ng/mL

## 2025-06-25 NOTE — HISTORY OF PRESENT ILLNESS
[Disease: _____________________] : Disease: [unfilled] [T: ___] : T[unfilled] [N: ___] : N[unfilled] [M: ___] : M[unfilled] [de-identified] : This 66-year-old male was first seen in consultation on July 20, 2016. In August of 2013 his PSA was 4.3. In September 30, 2013 it was 4.9. He underwent a biopsy in March 5, 2014 revealing Ade 7 (3+4) disease. He underwent external beam radiation therapy for a total dose of 8100 cGy in 45 fractions from May 25 and total July 31 of 2014. He subsequently experienced PSA failure and brachytherapy was considered. A CT scan was performed as part of that evaluation revealing a filling defect in the left ureter. In March 2016, he presented to the emergency room with a creatinine of 13.7 and potassium of 8.9. Bilateral nephrostomy tubes were placed. He was found to have GI bleeding and a colonoscopy revealed evidence of radiation proctitis. He has been on Lupron. 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 His initial staging was stage II, [id number].    The original pathology was reviewed prior to radiation. The left base revealed adenocarcinoma the prostate, Ade score 7 (3+4) involving 2 out of 2 cores, 40% of each core, with perineural invasion. The left mid zone revealed adenocarcinoma the prostate, Ade score 7 and (4+3) involving 50% of each of 2 cores, with perineural invasion noted. The left apex had 3 of 3 cores involved. One core was Ade 7 (3+4) involving 100% of the core. The other 2 cores had a Ade 6 (3+3) in less than 5% of 2 cores. Perineural invasion was identified as well.  Posttreatment biopsy revealed the presence of adenocarcinoma within the prostate gland. The PSA was 11 at the time of the biopsy.  Feels well at initial consultation. Started Lupron in January 2016 before the renal failure/obstruction. No pains. Fatigue at times. Appetite good, stable weight. Lost weight with illness in March, typical weight prior was 210, left hospital at 170. Weight stable. He was transfused multiple times. Some urine through penis recently. Has bilateral percutaneous nephrostomies. No blood, dysuria, no incontinence. Nocturia x 2-3 at this time. Right sided nephrostomy still has output, small amount in left bag. Due for change of tubes in September. Had radiation proctitis and has laser treatment. Sees GI in follow-up next month.   10/18/16...Had a colonoscopy, showed radiation colitis. No further rectal blood noted. Urine flow mostly from PCNs, some from penis. No dysuria, some  hematuria noted at times. Appetite is normal. No weight loss. No fatigue. Hot flushes occur daily. .  4/18/17...last scans 9/26.  Saw PCP and had PSA done, shows slight increase over manjinder. he was given ferrous sulfate to take for anemia. No pains. Urine flow is mostly thru PCNs. Still has occasional blood in the urine, noted in the bag. Has less volume form the left side.  Hot flushes occur a few a day, 15-20 minutes, occ sweats. Appetite is good, no weight loss. Some fatigue noted. No edema. Occ blood with stools, saw Dr Grossman in September, due for follow up. has not had major bleeding recently.   8/1/17...Surya states that he is feeling good, his urine flow is strong, slight increase, no pains anywhere, he gets hot flashes a "few  day, they are coming." He has some minor fatigue, He has percutaneous nephrostomies, He follows with Dr. Dick, no obvious hematuria, no breast tenderness.  11/14/17...Received Lupron from Dr Dick a few weeks ago. PSA was 0.02 on 8/1/17. He gets hot flashes a couple times a day, He has b/l nephrostomies, is able to pass urine through his penis. He denies hematuria, dysuria He states appetite is good. He gets some fatigue, He denies any pains.  4/17/18...Received Lupron from Dr Dick Jan 23, 2018. Not seen since November 17, due to illness. 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 Feels well. No pains noted. Some fatigue. Appetite is good. Reports that he has diarrheal for 2-3 months, not daily. He had loose stools this AM, took Imodium. Diarrhea occurs 1-2 days a week, the n stops. Some flatus, no cramps. No blood in the stool. Urine flow is via nephrostomy, minor amount thru penis, no dysuria, no incontinence. No blood in urine. Hot flushes most days.    Minor fatigue. Appetite normal.   7/17/18...On CAB. Feels "okay". No pains. Has bilateral nephrostomy tubes, due for change in August. No recent infections, occasionally passes urine via penis, no blood, no dysuria. Still has hot flushes, daily, more in the winter. Some fatigue, no physical impairment. Appetite is good. Weight stable. Diarrhea persists, on and off, not daily. Watery at times. takes Imodium on occasion. Occ small amount of blood with hard stools, secondary to radiation proctitis.   10/23/18...Feels "okay". No pains. Urine flow is minimal, most comes out of the nephrostomy tubes, nocturia -none. No blood, no dysuria. No incontinence. Hot flushes occur, day and night. Appetite is good, no weight. No dyspepsia, no nausea, no vomiting. Diarrheal stools occur episodically, every 2-3 weeks. No blood in the stool. Usually 1-2 BMs per day. Occasionally notes blood on toiler paper with hard stool. has RT proctitis.   2/5/19...Feels well, no pains. has hot flushes frequently, daily. Has some sweats with them at night. Appetite is good, no weight loss. Urine flow is minimal thru penis, nocturia does not occur. Most urine form PCNs, last catheter change January 2019, by IR at [facility]. Occ minor blood in the urine, when the catheter is loose. No back pains, no bone pains. Occ fatigue.   5/7/19...Had a calcium of 11.1 last visit. Called him and told to stop calcium and vit D. Repeat 3 weeks later was normal. Continues on Lupron and Casodex. Feels well. Hot flushes every day. He has some fatigue, unchanged. Appetite is good, no weight loss. Urine is minimal from penis, has bilateral PCNs. No bone pains. No edema.   8/8/19...Feels "all right", as a URTI. No pains noted. Hot flushes multiple time a day. Fatigue is present, mild. Urine flow is 'good', PCN bilateral, most flow form the right. Some flow thru penis, voids 1-2 times a day. NO blood, no dysuria. Due for PCN change next week. Appetite is good, no weight change.  10/29/19...Local failure prostate cancer, on CAB. Feels well. No pains., Urine flow is good. Has bilateral PCNs. Most of urine goes to PCNs. Occ small amount of blood in the bag. Voids 2 times a day. Hot flushes, daily, with sweats on occasion. has fatigue at times. Appetite is good, no weight loss. NO leg edema. No bleeding from the RT proctitis recently  1/28/20...Stopped bicalutamide after last visit. Last seen 3 months ago. PSA on 1/7 was 0.27, thus continued to rise somewhat. NO bone pains. Hot flushes daily, occ sweats. Appetite is good, no weight loss. No fatigue of note. Urine flow is good, but most flow is into the PCNs. Last change of PCNs was December after he had some leaking on one side. No hematuria, no dysuria. No incontinence.   5/6/20...Verbal permission granted for telephone services by patient, Surya Cervantes, on 5/6/20 at 1:35 PM.   Fells well. No pains noted. Nephrostomy tubes in place, replaced last week. Appetite is good, no weight loss. Hot flushes daily. He has chronic fatigue complaints. No leg edema noted. Urine through penis is minimal, only when tubes become clogged. No blood in the urine. No nausea, no vomiting. Has diarrhea at times, once every 2-3 weeks. No back pains noted. No fevers, no chills., No cough, no sputum.   6/17/20...Verbal permission for telephone contact was granted by the patient, Surya Cervantes, on June 17, at 4 PM. Feels "all right". NO pains. Hot flushes, daily, occ minor sweats. Fatigue is present, rated mild, occasional naps. Appetite is good, no weight loss. No edema. Urine flow is minimal from penis, has bilateral PCNs. No blood. No incontinence. No recent infections. No cough NO CHEN. No F/C. Helps in care of his mother, watches TV, not very active. Jeana from [employer].   7/29/20...Verbal permission for telephone contact was granted by the patient, Surya Cervantes, on July 29, 2020 at 3:10 PM.  Started tawanda/pred on June 16, 2020. Feels  "pretty good". He was having diarrhea prior and he no longer had diarrhea.   No pains at this time. No edema. No headaches. Checks BP on occasion.  BPs have been "normal", he will check often. Urine flow "about the same",. Most urine comes from PCNs. No nocturia, no  hematuria, no dysuria. Appetite is normal, thinks he may have lost a few pounds. No cough, No CHEN. NO F/C. Hot flushes occur daily. No change. Mild fatigue.  8/26/20...On abiraterone and prednisone since June, PSA dropped significantly. taking meds correctly. Feels 'all right", no pains. Fatigue is present, as before, "not terrible", takes naps. Slightly more fatigue than before. Appetite is good, but lost weight. eats with his mother, she eats less and so does he. No nausea, no vomiting. No headaches, no edema of note. Most of urine comes from PCNs, Urine from penis is minimal, does not get up at night. No blood, no dysuria. No incontinence. No blood in the PCN bags.  Has leg cramps daily, particularly in the AM. Dr Angulo administers Lupron, due for Rx on 9/1/20.   9/22/20...He is seen in the office today in follow-up. He's been on abiraterone and prednisone since June. He had elevated transaminases on September 3 and his abiraterone was subsequently held. He will have repeat blood work done today after today's visit. He states that he feels "all right." His appetite is stated to be "all right." His weight has been stable. He denies dyspepsia, nausea or vomiting. He denies constipation or diarrheal stools. There is no cough or shortness of breath. He denies skin rashes or pruritus. There are no complaints of pains at this time. Fatigue is present but it does not impair his daily activities. He takes occasional naps during the day. He denies arthralgias or myalgias. There are no headaches or dizziness. He has bilateral percutaneous nephrostomy tubes draining yellow urine. There is no blood in the percutaneous nephrostomy bags. Most of the urine comes from the percutaneous nephrostomy tubes with minimal amount of urine coming out through the penis. There is no nocturia. There is no hematuria or dysuria. There is no incontinence. There is no edema in the extremities. He has occasional hot flashes but felt it has improved these past few days. He denies fevers or chills. He infrequently checks his blood pressure at home. He would get blood pressure readings ranging from 150s-160s systolic over 80s-90s diastolic. He remains independent in his activities of daily living.   10/27/20...Verbal permission for telephone services granted by the patient,  Surya Cervantes on October 27, 2020 at 3:48 PM.  Stared abiraterone in June. Feels "pretty good". NO increased edema. No headaches. Hot flushes are less. Appetite is good, no weight loss. Has PCNs, has urine form the penis at times. NO blood in the urine. No dysuria. No nocturia. No incontinence. No bone pains noted. Fatigue  RTs, takes a nap at times. Cares for his elderly mother at home.   11/25/20...Verbal permission for telephone services granted by the patient,  Surya Cervantes on November 25, 2020 at 1:50 PM On 1/2 dose tawanda due to transaminases. No pains, feels "all right". Some fatigue. Urine flow form penis in minimal, has bilateral PCNs. Occasional small amount of blood in the right PCN tube. Appetite is good, no weight loss. No cough, no CHEN. Hot flushes daily. No headaches, no edema. BP monitored at home, 151/89, pulse 95.   12/23/20...Verbal permission for telephone services granted by the patient,  Surya Cervantes on December 23, 2020 at  1:32 PM.  Feels well. No pains noted. Urine flow RTS. Has PCNs, minimal urine via penis on occasion. No blood in urine or tubes of note. Appetite is good, no weight loss. NO edema of the legs. NO headaches, no dizziness. No cough, no CHEN. No F/C. Hot flushes occur daily, less often. Due Lupron next month from Dr Angulo. Mild fatigue, take a nap. Independent in ADLs, cares for his mother. BP at home 138/90, pulse 71. Left arm 147/92  1/28/21...Verbal permission for telephone services granted by the patient,  Surya Cervantes on January 28, 2021 at 315 PM.  Surya reports that he has no pains.  There is no significant urine within his blood.  In the interim, he had to have the left percutaneous nephrostomy tube changed as there was pus present.  They had some difficulties apparently.  The tube was clogged and he was unable to change it over a guidewire.  He was not treated with any antibiotics.  He does have some urine from the penis at times.  There is no fevers or chills.  His appetite is good and there is no weight loss.  He reports that his weight is 190 pounds today.  He says his blood pressure was 146/92 in the left arm and 145/85 on the right arm.  The pulse rate was 75.  He reports no edema or headaches.  There are some hot flushes.  2/25/21...Verbal permission for telephone services granted by the patient,  Surya Cervantes on 2/25/21 at 3:10 PM Feels "all right". NO pains. He was having issues with PCNs clogged, had to have removal and reinsertion. It was the right side this week, done on Tuesday. Told to flush it daily rather than every 3 days. Appetite is good, no weight loss, no edema. No cough, no CHEN. BP is monitored, right side 139/84 today, left 134/82, pulse 79. No headaches, no significant fatigue, does nap on occasion. NO leg weakness. No N/V/D/C. Minimal urine from penis. There was more flow when the dysfunction occurred. NO blood in the urine, no dysuria.   4/7/21...  10/26/21....Last evaluation was 4/7/21, by telephone. Completed antibiotics. Was in rehab for 6 weeks. Strength is good, no pains. Appetite is good, gained some weight. urine flow is good, no incontinence, very little urine from penis, mostly from the tubes. No headaches, no dizziness, no balance issues, No falls. Hot flushes occur. Lives with mother, helps her. No edema at this time.   From neurosurgery, this summary of events....68 yo Male w/ PMHx of HTN, prostate CA (on chemo, s/p radiation), b/l nephrostomy tubes, presented  to [facility] ED on 6/14/21 with weakness and confusion. In ED foulurine in b/l nephrostomy bags noted, tachycardic, and hypotensive. s/p IVF and levo drip for septic shock, developed fluid overload with pitting edema and decreased  EF. Imaging of the spine concerning for discitis and osteomyelitis c-spine with T1 inflammation/signal changes.   Hospital coursed remarkable for BC + Klebsiella and strep anguinous. Started on vanco/zosyn 6/14. As per ID switched to ceftriaxone 2 g on 6/17. Hospitalization c/b ASHLEY on CKD with right hydronephrosis. IR consulted- no intervention, both nephrostomy tubes flushing adequately. Nephrology  consulted, started on stress dose steroids/hydrocortisone 50 mg q6 and Midodrine. CTAP showed Sludge in gallbladder.  Patient  underwent C6-7 corpectomy and posterior fusion C4-T2 on 6/29/21. Hospital course c/b tachycardia, fever with low O2 sats. Chest CT negative for  PE, lower extremity Dopplers negative. s/p PICC, discharged to inpatient rehab on 7/12/21 and then was discharged home after completion of rehab.   Last Eligard was August with Dr Angulo, 30 mg dose.   11/30/21...on tawanda/pred since June 2020. Feels  "all right". Has hot flushes and fatigue. Does not prevent activities. He falls asleep easily if he sits in a chair. Independent in ADLs, cares for his mother. No infections recently, No F/C. No recent adventures with with his PCNs. No blood in urine, occ urine via penis. Appetite is good, gained 2.2 kilos. Edema decreased. No chest pain/pressure. No cough/ CHEN. No headaches, no dizziness. No N/V/D/C.   1/11/22....telehealth today. Feels  "all right". No pains of note. t flushes daily. Has some fatigue as well. No edema noted. NO cough/CHEN No chest pain/pressure. No F/C. Appetite is good, no weight loss. Weighs 176. Checks BP at home, 142/98 RP 72 today, L arm, 142/91. Takes terazosin at night. Sees PCP in March. Needs to call PCP for tighter BP control. NO headaches, no dizziness. No F/C. No hematuria, has bilateral PCNS, has some small amount of urine from the penis. Had change of PCNs last week. Independent in ADLs.   2/8/22 - telehealth visit today. Continues on abiraterone 500mg daily + prednisone. Reports his BP today was 143/94 with HR of 74. Overall feeling "alright". Moderate fatigue, takes naps during the day. Occasional hot flashes. Appetite is good, weight today is 180lbs, he attributes recent weight gain to eating better after being discharged from the hospital. Mild ptosis of left eyelid comes and goes and has reportedly been stable for his "whole life". Bilat percutaneous nephrostomy tubes in place, no hematuria. Ongoing mild BLE edema is reportedly stable. Ongoing intermittent arthritis pains of left knee and right shoulder are stable. Denies fever, chills, headache, dizziness, balance issues, eye pain/problems, mucositis/odynophagia, chest pain, palpitations, SOB, cough, nausea/vomiting, diarrhea/constipation, abdominal pain, hematuria, rash/pruritus, neuropathy, bleeding, weakness, anxiety/depression.  3/17/22...tawanda/pred started mid June 2020. PSA was 11.6 at start. Feels "all right". No pains. No major issues with the PCNs, having a change done next week. Appetite is good, no weight loss. No cough, No CHEN. Some leg edema. No chest pain/pressure. Urine from penis is relatively little. No dysuria, no blood. has calcium oxalate excretion, which causes his tubes to be clogged. Notes some bruising. NO headaches, no dizziness, no balance issues. No F/C. No N/V/C, some minor diarrheal stools at times. Takes Imodium as as needed. Fatigue is present at times, has hot flushes occur multiple times a day.   4/20/22 - continues on abiraterone (500mg daily) + prednisone since June 2020 for mCRPC. Overall feeling "alright", ongoing mild fatigue is stable. Intermittent hot flashes are tolerable. Mild night sweats during the summer when it gets warmer. Ongoing mildly productive cough which he's had for "a long time". Occasional diarrhea that doesn't last more than a day. Bilateral percutaneous nephrostomy tubes in place, passing minimal urine from his penis. Occasional hematuria especially from right nephrostomy bag, clear today. Ongoing mild BLE edema. Ongoing chronic left knee swelling and pain 2/2 arthritis. Ongoing right shoulder pain 2/2 arthritis as well. Denies fever, chills, headache, dizziness, balance issues, eye pain/problems, mucositis/odynophagia, chest pain, palpitations, SOB, nausea/vomiting, constipation, abdominal pain, rash/pruritus, bleeding, muscle pain/weakness  5/24/22 - continues on abiraterone (500mg daily) + prednisone since June 2020 for mCRPC. BP today is 164/95, asymptomatic. BP at home has been 150s/80-90s. On occasion the DBP has been >100. Overall feeling "alright", ongoing fatigue is stable. Ongoing intermittent hot flashes. Appetite is good. Vision is changing in his left eye, he has an appt with [place] this Thurs. Bilateral nephrostomy tubes in place, occasional hematuria at times resolves after 2-3 days. Ongoing trace edema of BLEs. Intermittent pruritus around nephrostomy tube dressings. Denies fever, chills, night sweats, headache, dizziness, balance issues, mucositis/odynophagia, chest pain, palpitations, SOB, cough, nausea/vomiting, diarrhea/constipation, abdominal pain, dysuria, incontinence,  rash, neuropathy, bleeding, muscle or joint pain/weakness.   6/21/22 - continues on abiraterone, half dose + prednisone since June 2020 for mCRPC. PSA 11.6 at start of treatment in June 2020, manjinder PSA at o.o5 August 2021, slow rise, recent PSA in May 2022 was 0.17.  feels  "all right". No pains noted. Has bilateral PCNs, changed last week. No infections, no F/C. Some fatigue, always, no worse. Appetite is good, no weight loss. NO headaches, no dizziness, no balance issues. Some edema noted, as before. No chest pain/pressure. NO bone pains. No N/V/D/C. Small amounts of urine via the penis. Hot flushes daily, multiple times. No cough, no CHEN.   7/28/22 - continues on abiraterone 500mg + prednisone since June 2020 for mCRPC. Overall feeling "alright", ongoing mild fatigue for which he occasionally takes a nap once a day. Ongoing hot flashes are tolerable. Appetite is good. Occasional cough. Bilateral percutaneous nephrostomy tubes in place passing most of the urine through the bags but still passing minimal urine via penis, no nocturia. Trace edema of BLEs. Notes intermittent cramping of hands which may be arthritis as it improves with movement. Has ongoing intermittent right buttock pain radiating down to the knee, present for the past year, pain is worse with sitting for a long time, improves with changing positions, max pain is 3/10 not requiring medication. Denies fever, chills, night sweats, headache, dizziness, balance issues, eye pain/problems, mucositis/odynophagia, chest pain, palpitations, SOB, nausea/vomiting, diarrhea/constipation, abdominal pain, dysuria, hematuria, incontinence, rash/pruritus, bleeding, weakness.   10/11/22 - continues on abiraterone 500mg daily + prednisone since June 2020 for mCRPC. Neurosurgical notes reviewed...."S/P cervical spinal fusion, 70 year old male 15 months pos op C6-7 corpectomy and posterior fusion C4-T2 on 6/29/21 with Dr. Tiffanie Martinez". Now seeing Dr Resendiz. Overall, feels "all right". No pains noted. urine flow is "fine", no incontinence, most urine still out PCNs, some from the penis. No blood, no dysuria. Hot flushes daily.  Some fatigue, RTS. Appetite is good, no weight loss. No headaches, dizziness, no balance issues, No paresthesias. Occ cough, sputum is white. No CHEN. Tubes to be changed next week. No N/V/D/C.  Some edema , improved. No chest pain/pressure  11/9/22 - continues on abiraterone 500mg daily + prednisone since June 2020 for mCRPC which he is tolerating well. Pt reports feeling generally well. No new complaints. Bilateral nephrostomy tubes in place, denies hematuria . Ongoing trace edema of BLEs. Intermittent pruritus around nephrostomy tube dressings. Denies fever, chills, night sweats, headache, dizziness, balance issues, mucositis/odynophagia, chest pain, palpitations, SOB, cough, nausea/vomiting, diarrhea/constipation, abdominal pain, dysuria, incontinence, rash, neuropathy, bleeding, muscle or joint pain/weakness. Appetite reported as good. No changes in medications  12/7/22 - continues on abiraterone 500mg daily + prednisone since June 2020 for mCRPC.  gets his Eligard 45 tomorrow form Dr Angulo. feels "all right". No pains noted except for arthrtic complaints. Appetite is okay< dropped a few pounds. usual edema, no change. No headaches, NO dizziness. Most of the urine comes from PCNs, changed every 6 weeks due to prior infections. NO fevers, no chills. Hot flushes occur, 1-2 times a day. Notes a bit more urine thru penis lately. Occ cough, no CHEN. No chest pain/pressure/palpitations. NO N/V/D/C. Mild fatigue. Independent in ADLs.   1/4/23 - continues on abiraterone 500mg daily + prednisone since June 2020 for mCRPC. Overall feeling "alright", ongoing fatigue is stable. Ongoing hot flashes are tolerable. Appetite is "ok". Ongoing occasional productive cough is stable. Bilateral perc nephrostomy tubes in place, continues to pass some urine through the penis, no nocturia. Denies fever, chills, night sweats,headache, dizziness, balance issues, eye pain/problems, mucositis/odynophagia, chest pain, palpitations, SOB, nausea/vomiting, diarrhea/constipation, abdominal pain, dysuria, hematuria, incontinence, LE edema, rash/pruritus, bleeding, muscle or joint pain/weakness.   2/13/23..... continues on abiraterone 500mg daily + prednisone since June 2020 for mCRPC. treated with cephalexin and developed rash, red skin, pruritus. Chart labeled as allergy. Hospitalization was brief. sudden onset of symptoms, Hd diarrheal stools as well. Feels  "pretty good", except for residual pruritus. No pains noted.  Has tube change set for next week. Appetite has retuned. No N/V. Diarrhea resolved, NO taste alteration. No headaches, no dizziness. had some edema. resolved. No chest pain/pressure/palpations.  has his usual fatigue and hot flushes. Cough, chronic, with clear sputum. No CHEN/SOB. No fevers of chills since discharge  Hospital Course:  Discharge Date	01-Feb-2023  Admission Date	29-Jan-2023 17:52  Reason for Admission	Syncope  Hospital Course	  69 yo M w/ HTN, gout, prostate cancer s/p b/l nephrostomy tubes, on abiraterone, admitted w/ sepsis 2/2 UTI, improved on antibiotics.  Sepsis secondary to UTI.  was febrile, tachycardic in the ED, met criteria for sepsis on admission  - U/A w/ +nitrite, large LE, prior cultures with klebsiella and E. coli  - current Ucx >3 organisms suggestive of collection contamination  - received IV zosyn (1/29/23-2/1/23),  Bcx negative, discharge on oral cephalexin to complete 10 days for complicated UTI (end date 2/7/23)  - sepsis resolved (currently afebrile, without leukocytosis, tachycardia resolved)  - IR consulted nephrostomy tubes in position and draining well, no indication for exchange at this time.   Gastroenteritis.   had reported nausea/vomiting/diarrhea after eating fried rice w/ beef  - s/p IV fluids, now resolved.   Syncope.   reports syncopal episode likely 2/2 dehydration from gastroenteritis  - s/p IV fluids, EKG sinus rhythm, troponin indeterminate but no increased  - reports symptoms resolved, ambulating around unit independently (advised call don't fall).   3/21/23..... continues on abiraterone 500mg daily + prednisone since June 2020 for mCRPC. treated with cephalexin and developed rash, red skin, pruritus. Chart labeled as allergy. Overall, he feels "all right". No pains of note. Appetite is good, no weight loss. No edema. No chest pain/pressure/palpitations. some cough, no CHEN. Hot flushes daily. Fatigue is present.  Does yoga, runs in place. No HA, no dizziness, no falls. Independent in ADLs. no fevers, no chills. Minimal urine from penis, no dysuria. PCNs are changed every 6 weeks, no blood in tubes. No N/V/D/C.   4/17/23.....  on abiraterone 500mg and prednisone since June 2020 for mCRPC. Prior transaminitis led to decrease in dose. "doing okay". Minor discomfort on left side near the PCN. Has change every 6 weeks at this time. Appetite is good, no weight loss. No N/V/D/C. Has some urine form the penis, noted if PCNs pinch. No blood in the urine, no hematuria. Hot flushes as before, about 3 times a day.  fatigue RTS. No cough, no CHEN, No fevers, no chills. No edema, no chest pains,pressure/palpitations  Exercises every AM.   5/16/23....continues on abiraterone 500mg and prednisone since June 2020 for mCRPC. Prior transaminitis led to decrease in dose. Eligard next month with Dr Angulo. Overall, feels "all right". No pains noted. Appetite is good, no weight loss. No N/V/D/C. Urine from penis on occasion, empties bladder before bed. No hematuria, no dysuria. No fevers, no chills. PCNs were changed last week. No headaches, no dizziness, no balance issues.  Exercises daily, walking and running. Occ cough, no CHEN. No chest pain/pressure/palpitations. Hot flushes remains. No fevers, no chills  6/12/23.... on abiraterone 500mg and prednisone since June 2020 for mCRPC. Prior transaminitis led to decrease in dose. Mara with Dr Angulo. Feels "well". NO pains, except for radicular pain in right thigh, present for about 6 weeks, constant. uses a topical, icy-hot. Does not awaken him, better when he is lying down, aggravated by ambulation. Appetite is normal. No weight loss. NO fevers, no chills. NO fatigue. No headaches, no dizziness, no paresthesias. No cane. No falls. No cough, no CHEN. NO N/V/D/C. Urine from PCNs. Some from the penis, no hematuria, no dysuria. No nocturia. Hot flushes daily. No edema. NO chest pain/pressure/palpitations. No back pains. Independent in ADLs  7/18/23 - continues on abiraterone + prednisone (500mg daily d/t transaminitis) for mCRPC since June 2020. Ongoing fatigue after periods activity, naps once a day. Ongoing hot flashes are tolerable. Appetite is good. Occasional cough. Bilateral perc nephrostomy tubes in place, scant blood if the tubing moves around but not persistent. Ongoing pain in right buttock radiating down posterior thigh/leg, no pain with lying down, pain is worse with sitting for a prolonged time and improves with walking, max pain is 8/10. Not taking any pain medication as the pain eases up when he gets up and walks around.   8/16/23 - continues on abiraterone + prednisone (500mg daily d/t transaminitis) for mCRPC since June 2020. Overall feeling "alright", ongoing fatigue is stable. Ongoing frequent hot flashes, tolerable. Appetite is "alright". Ongoing occasional cough. Perc nephrostomy tubes exchanged 2wks ago, occasional transient hematuria which he attributes to accidentally pulling on the tubes. Ongoing stable intermittent right buttock pain radiating down posterior thigh down to the calf, improves with movement and worse with sitting for a long time, max pain is 5/10. Using a topical cream called "Australian Dream" which has been helpful.   9/20/23 - continues on abiraterone + prednisone (500mg daily d/t transaminitis) for mCRPC since June 2020. last PCN change was last week. On a 6 week change protocol. Feels "not bad", chronic leg pains, form right buttock, down the leg. Passing urine via penis, very small amounts. No blood, no dysuria. No nocturia. No incontinence, voids about 2 times a day. Hot flushes, every day, no sweats. Fatigue is present, naps, feels refreshed afterwards. Does yoga, stretching, walking, lifts some weights, daily basis. Appetite is good, no weight Kenji Occ cough, no CHEN. No edema, no chest pain/pressure.   10/31/23 - continues on abiraterone + prednisone (500mg daily d/t transaminitis) for mCRPC since June 2020. Overall feeling "alright", ongoing fatigue is stable. Occasional hot flashes. Appetite is "alright". Perc nephrostomy tubes in place, passing minimal urine through his penis. Ongoing pain in right buttock that radiates down the posterior leg, worse in the morning when first waking up and worse with sitting for a prolonged period, improves/resolves with activity. Denies fever, chills, night sweats, headache, dizziness, balance issues, chest pain, palpitations, SOB, cough, nausea/vomiting, diarrhea/constipation, abdominal pain, dysuria, hematuria, incontinence, LE edema, bleeding.   12/5/23 - continues on abiraterone + prednisone (500mg daily d/t transaminitis) for mCRPC since June 2020. Overall feeling well, some fatigue but remains active and exercising regularly. Ongoing hot flashes are tolerable. Appetite is good. Bilateral percutaneous nephrostomy tubes in place, no hematuria. Ongoing right buttock pain that radiates down the posterior leg, worse in the morning and improves with activity, max pain is 4-5/10, declines referral to PM&R. Denies fever, chills, night sweats, headache, dizziness, balance issues, chest pain, palpitations, SOB, cough, nausea/vomiting, diarrhea/constipation, abdominal pain, LE edema, bleeding.  1/2/24 - on abiraterone + prednisone since June 2020, discontinued 12/26/23 for POD seen on 12/21/23 bone scan. Feeling well, no significant fatigue. Remains active and doing exercises daily. Occasional hot flashes are tolerable. Bilateral percutaneous nephrostomy tubes in place, no hematuria. Ongoing right buttock pain that radiates down the posterior leg, worse in the morning and improves with activity, max pain is 7-8/10, pain is stable. Denies fever, chills, night sweats, headache, dizziness, balance issues, chest pain, palpitations, SOB, cough, nausea/vomiting, diarrhea/constipation, abdominal pain, LE edema, bleeding.   2/14/24 - Xtandi started early Jan 2024 for mCRPC. Over the past week he's been very fatigued. Ongoing hot flashes. Appetite is decreased and not eating as much as he used to, weight is down 11lbs over the past 6wks. Stable cough since before start of Xtandi. Percutaneous nephrostomy tubes in place, no hematuria. Notes some pains in the hands, knees, and ankles which he attributes to arthritis. Ongoing pain in right buttock radiating down posterior leg, pain is worse in the AM when first waking up and improves with activity/walking, pain waxes and wanes, max pain is 7/10 but not taking any medication for this. Denies fever, chills, night sweats, headache, dizziness, balance issues, chest pain, palpitations, SOB, nausea/vomiting, diarrhea/constipation, abdominal pain, LE edema, bleeding.   3/14/24 - continues on Xtandi since early Jan 2024 for mCRPC. Overall feeling ok, fatigue is a little improved which he believes is because his BP hasn't been as low as it was in the past. BP at home has been 120's/70-80's in the mornings. Ongoing hot flashes are not as intense. Appetite is decreased, weight is down 7lbs over the past month, but overall down 18lbs over 2 months. Urine from bilateral percutaneous nephrostomy tubes is "good", no hematuria. Ongoing pain in right buttock down the posterior leg, pain is worse with sitting for a prolonged period, improves with getting up and moving around, not interfering with his sleep, not needing any medication for pain. Denies fever, chills, night sweats, headache, dizziness, balance issues, chest pain, palpitations, SOB, cough, nausea/vomiting, diarrhea/constipation, abdominal pain, LE edema, bleeding.  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 NO AAW benefit. Started tawanda/pred mid June 2020, discontinued 12/26/23 for disease progression. Xtandi started early Jan 2024, decreased to 80mg daily in May 2024 d/t weight loss. Switched to docetazel in 12/2024 due to PSA progression. [de-identified] : 4/16/24 - continues on Xtandi since early Jan 2024 for mCRPC. Excess lambda light chains on last blood work, no M-spike. Feels well. no Pains. Has to some fatigue, naps during the day. Appetite is "good", No N/V/D/C. No cough, no CHEN. NO chest pain/pressure. No headaches, no dizziness. No balance issues, minimal, walks with a cane. No falls. Urine  flos via tubes, no recent infections. NO blood in urine. Some minor urine amounts from penis. No nocturia, no dysuria. Hot flushes occur, less than before, 1-2 times a day. No edema noted. Independent in ADLs.  CDX done, no targets  5/15/24 - continues on Xtandi since early Jan 2024 for mCRPC. Feeling "alright". Ongoing fatigue, naps 1-2x/day. Occasional hot flashes. Decreased appetite, not eating as much as before, drinking 1-2 Ensure per day, weight is down 5lbs over the past month. Bilateral percutaneous nephrostomy tubes in place, no hematuria. Ongoing arthritic pains of the shoulders and hands at times. Denies fever, chills, night sweats, headache, dizziness, balance issues, chest pain, palpitations, SOB, cough, nausea/vomiting, diarrhea/constipation, abdominal pain, LE edema, bleeding.   6/12/24 - continues on Xtandi since early Jan 2024 for mCRPC, decreased to 80mg daily as of May 2024 for weight loss. Ongoing fatigue is stable. Ongoing hot flashes. Appetite is "a little better". Urine from nephrostomy tubes is clear yellow, no hematuria. Denies fever, chills, night sweats, headache, dizziness, balance issues, chest pain, palpitations, SOB, cough, nausea/vomiting, diarrhea/constipation, abdominal pain, LE edema, bleeding, muscle or joint pain/weakness.  7/11/24 - on Xtandi since early Jan 2024 for mCRPC, decreased to 80mg daily as of May 2024 for weight loss. Patient accompanied by brother, reports to be feeling well, appetite slowly improving, continues to have nephrostomy tubes in places, last changed 23 weeks ago draining clear yellow urine. Denies fever, chills, headaches, chest pain, sob, abdominal pain/back pain.   8/14/24 - on Xtandi since early Jan 2024 for mCRPC, decreased to 80mg daily as of May 2024 for weight loss. Overall feeling well, ongoing fatigue is stable. Ongoing hot flashes.  Appetite is stable, weight is up a couple lbs since last visit. Diarrhea yesterday "all day" with >6-7 episodes of soft/loose stool, he took PRN Imodium yesterday, no more stool today, unsure if it was r/t something he ate. Urine from nephrostomy tubes is "good", no hematuria. Denies fever, chills, night sweats, headache, dizziness, chest pain, palpitations, SOB, cough, nausea/vomiting, constipation, abdominal pain, LE edema, bleeding, muscle or joint pain/weakness.  9/17/24 - on Xtandi since early Jan 2024 for mCRPC, decreased to 80mg daily as of May 2024 for weight loss. Feeling "alright", stable fatigue. Ongoing hot flashes are tolerable. Appetite is stable. Having intermittent diarrhea approx every other week with approx 3 episodes when it occurs but resolves with PRN Imodium 1-2 tabs, having normal BMs in between, unsure if diarrhea is r/t eating spinach. Bilateral nephrostomy tubes in place. Denies fever, chills, night sweats, headache, dizziness, chest pain, palpitations, SOB, cough, nausea/vomiting, constipation, abdominal pain, hematuria, LE edema, bleeding, muscle or joint pain/weakness.  10/16/24 - on Xtandi since early Jan 2024 for mCRPC, decreased to 80mg daily as of May 2024 for weight loss. Ongoing fatigue is stable. Ongoing hot flashes are tolerable. Appetite is "alright". Occasional diarrhea is improved, only 2 episodes of diarrhea over the past month. Bilateral percutaneous nephrostomy tubes in place, still passes some urine through his penis. Ongoing intermittent arthritic pains of the shoulders and knees are stable since before cancer diagnosis. Denies fever, chills, night sweats, headache, dizziness, chest pain, palpitations, SOB, cough, nausea/vomiting, constipation, abdominal pain, dysuria, hematuria, incontinence, LE edema, bleeding.   11/14/24: presents for follow up and management of metastatic CRPC on ADT + reduced dose enzalutamide in setting of weight loss and fatigue. His PSA has been noted to be rising over the last several visits from a manjinder of 8.13 to 15.3 at last visit on 10/16/24.   12/2/24: He presents for follow up and cycle 1 of docetaxel 75mg/m2 after his PSA was noted to rise further from 15.3 to 21.4. He has stopped enzalutamide.  He denies any new significant complaints since last visit.   12/23/24: He presents for follow up and cycle 2 of docetaxel 75mg/m2. He notes transient diarrhea and increased fatigue after cycle 1. Also notes alopecia and increased blood pressure when he takes steroid premedication. Denies fever, chills, night sweats, bony pain.  1/13/25:  He presents for follow up and cycle 3 of docetaxel 75mg/m2. He denies any significant change in symptoms. He has intermittent leaking from nephrostomy sites, increased on days of tx.  Notes continued fatigue. Also notes alopecia and increased blood pressure when he takes steroid premedication. Denies fever, chills, night sweats, bony pain.  2/3/25:  He presents for follow up and cycle 4 of docetaxel. He denies any significant change in symptoms.  Notes continued fatigue. Also notes alopecia and increased blood pressure when he takes steroid premedication. Denies fever, chills, night sweats, bony pain.   2/27/25: He presents for follow up and cycle 5 of docetaxel...now dose reduced to 60mg/m2 2/2 cytopenias and fatigue.  He denies any significant change in symptoms.  Notes continued fatigue. BP has been stable. Denies fever, chills, night sweats, bony pain. Last imaging in October. Will repeat after C6. PSA continues to trend downward.   3/20/25: He presents for follow-up and cycle 6 of docetaxel. Does not appreciate a symptomatic difference between the prior dose and his reduced dose he received last cycle. Endorses hot flashes. Denies pain. Nephrostomy tubes functional. Reports occasional blood from R tube. CBC with improvement in Hb since dose reduction. Has been losing weight although endorses good appetite and is "always thirsty".   4/10/25: He presents for follow-up and cycle 7 of docetaxel. Pt dose reduced to 60mg/m2 since C4 2/2 fatigue and cytopenias. Does not appreciate a symptomatic difference between the prior dose and his reduced dose he received last cycle. Endorses occasional hot flashes. Denies pain. Nephrostomy tubes functional. Less leakage since tube exchange. No bleeding. He continues on monthly xgeva. Denies jaw pain or issue with teeth. Pt noted with elevated /84. Pt reports that he did not take terazosin today because his BP was much lower at home. He states that "he only takes his medication when his morning BP is high". Pt asymptomatic. Pt weight noted to be trending downward. Lost 7 lbs since beginning of the year. Appetite reported as good. Weight loss not intentional.   6/5/25: Since last visit Mr. Morales was admitted to the hospital for diverticular abscess formation. He was treated with IV Cipro/Flagyl. Abscess was not amenable to IR drainage. He was managed conservatively. Repeat CT imaging prior to discharge revealed decreased size of abscess. He was discharged home on PO cipro/Flagyl. He reports he is "ok"He is scheduled for Surgical f/u on 6/9. He denies fever/pain. Appetite poor.   6/25/25: Pt seen in follow up today to recent admission to the hospital. Mr Surya Cervantes presented to the ED secondary to syncope while undergoing an IR procedure for nephrostomy tube exchange. While undergoing procedure patient developed severe mid chest pain (has had prior episodes when lying supine), and undergoing bilateral nephrostomy tube exchange.  RRT 6/17 during IR procedure with HR 200s as per IR note.  Cardiology consulted, input appreciated. Tele with sinus tach, occasional PVCs. TTE reviewed by cardiology, no significant structural disease, no further inpatient cardiac workup advised by Cardiology. Suspected vasovagal/hypovolemic etiology of episode. Cardiology not advising BB at this time, low PVC burden. Suspect sinus tach possibly iso potential hypovolemia/poor PO intake. S/p IVF this hospitalization. Tachycardia improved with IVF and encouragement of PO intake. TSH wnl. Infectious workup unremarkable. Pt felt that his symptoms were 2/2 postural hypotension. Today he reports he is feeling "ok". Appetite has improved some. Brother states he has been eating better since he was discharged from hospital. No return of symptoms since d/c [ECOG Performance Status: 1 - Restricted in physically strenuous activity but ambulatory and able to carry out work of a light or sedentary nature] : Performance Status: 1 - Restricted in physically strenuous activity but ambulatory and able to carry out work of a light or sedentary nature, e.g., light house work, office work

## 2025-06-25 NOTE — ASSESSMENT
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 He started abiraterone + prednisone in June 2020, dose was reduced to 500mg d/t transaminitis. Abiraterone discontinued late December 2023 for disease progression. Xtandi started Jan 2024.  He experienced transient benefit but appears to have progressing disease at this time as manifested by steadily rising PSA since 6/2024 and new lesions noted on CT imaging performed 10/30/24.   We discussed these results and that he will need an alternative therapy to control his disease. Standard of care options include taxane chemotherapy and Pluvicto. Given further increase in PSA to 21.4, we discussed the potential risks and benefits associated with docetaxel. He was amenable and signed informed consent.   He is tolerating treatment well to date. He is noted to have worsening anemia. To reduce likelihood of significant anemia, we reduced the dose of docetaxel to 60mg/m2. Has not been able to tell if symptoms are improved after dose reduction, but Hb is improved on CBC.    mCRPC: POD on tawanda/pred ->enzalutamide.  Now on docetaxel. Dose reduced to 60 mg/m2 since C4 2/2 fatigue/cytopenias - Feb 2024 [facility] CDx was negative for any actionable mutations, MSI high not detected, TMB 8 muts/Mb, ctDNA <1%.  -Continue Eligard q6mo injection with urology, given 12/12/24, next scheduled for 6/2025 -Scheduled for cycle 9 docetaxel next week, dose reduced to 60mg/m2 pending outcome of todays labs.  -trend PSA. Labs sent today  #Syncope post procedure -w/u during admission negative. Treated for hypotension/ dehydration/ tachycardia.  -Patient scheduled for cardiology f/u  #diverticulat Abscess -S/P hospitalization. Treated with IV Cipro/Flagyl. Abscess not amenable to IR drainage. Pt discharged home on PO formulation which he has completed.  -Surgical follow up scheduled pending with Dr. West. Pt appointment delayed 2/2 insurance issues. No fevers since completion of antibiotics.   Bones: - He is off Ca + D supplementation d/t hypercalcemia.  - Monthly Xgeva inj started 5/15/24, continue  Cytopenia/anemia -Likely treatment related. No bleeding. Monitor hgb. CBC sent today/  -Docetaxel, dose reduced to 60mg/m2 C4  HTN -Concern for sporadic dosing of Terazosin. Encouraged f/u with pmd.   Instructed to contact our office with any new/worsening symptoms. Pt and family member educated regarding plan of care, all questions/concerns addressed to the best of my abilities and their apparent satisfaction.  RTC next week   [Palliative Care Plan] : not applicable at this time [With Patient/Caregiver] : With Patient/Caregiver [AdvancecareDate] : 1/2/24 [FreeTextEntry2] : Health care proxy form provided to pt 12/5/23, he still has at home. Instructed once again to bring the completed form to his next office visit.

## 2025-06-25 NOTE — PHYSICAL EXAM
[Restricted in physically strenuous activity but ambulatory and able to carry out work of a light or sedentary nature] : Status 1- Restricted in physically strenuous activity but ambulatory and able to carry out work of a light or sedentary nature, e.g., light house work, office work [Normal] : affect appropriate [de-identified] : ill appearing (+) alopecia (+) pallor [de-identified] : anicteric  [de-identified] : Dry oral mucosa [de-identified] : 1+ LE edema  [de-identified] : b/l nephrostomy tubes

## 2025-06-25 NOTE — REVIEW OF SYSTEMS
[Fever] : no fever [Chills] : no chills [Night Sweats] : no night sweats [Fatigue] : fatigue [Chest Pain] : no chest pain [Palpitations] : no palpitations [Lower Ext Edema] : no lower extremity edema [Shortness Of Breath] : no shortness of breath [Cough] : no cough [Abdominal Pain] : no abdominal pain [Vomiting] : no vomiting [Constipation] : no constipation [Dysuria] : no dysuria [Incontinence] : no incontinence [Joint Pain] : joint pain [Joint Stiffness] : no joint stiffness [Muscle Pain] : no muscle pain [Muscle Weakness] : no muscle weakness [Dizziness] : no dizziness [Hot Flashes] : hot flashes [Easy Bleeding] : no tendency for easy bleeding [Easy Bruising] : no tendency for easy bruising [de-identified] : occasional [de-identified] : hot flashes

## 2025-07-05 NOTE — PHYSICAL EXAM
[Restricted in physically strenuous activity but ambulatory and able to carry out work of a light or sedentary nature] : Status 1- Restricted in physically strenuous activity but ambulatory and able to carry out work of a light or sedentary nature, e.g., light house work, office work [Normal] : affect appropriate [de-identified] : anicteric  [de-identified] : ill appearing (+) alopecia (+) pallor [de-identified] : Dry oral mucosa [de-identified] : 1+ LE edema  [de-identified] : b/l nephrostomy tubes

## 2025-07-05 NOTE — PHYSICAL EXAM
[Restricted in physically strenuous activity but ambulatory and able to carry out work of a light or sedentary nature] : Status 1- Restricted in physically strenuous activity but ambulatory and able to carry out work of a light or sedentary nature, e.g., light house work, office work [Normal] : affect appropriate [de-identified] : anicteric  [de-identified] : ill appearing (+) alopecia (+) pallor [de-identified] : Dry oral mucosa [de-identified] : 1+ LE edema  [de-identified] : b/l nephrostomy tubes

## 2025-07-05 NOTE — HISTORY OF PRESENT ILLNESS
[Disease: _____________________] : Disease: [unfilled] [T: ___] : T[unfilled] [N: ___] : N[unfilled] [M: ___] : M[unfilled] [ECOG Performance Status: 1 - Restricted in physically strenuous activity but ambulatory and able to carry out work of a light or sedentary nature] : Performance Status: 1 - Restricted in physically strenuous activity but ambulatory and able to carry out work of a light or sedentary nature, e.g., light house work, office work [de-identified] : This 66-year-old male was first seen in consultation on July 20, 2016. In August of 2013 his PSA was 4.3. In September 30, 2013 it was 4.9. He underwent a biopsy in March 5, 2014 revealing Ade 7 (3+4) disease. He underwent external beam radiation therapy for a total dose of 8100 cGy in 45 fractions from May 25 and total July 31 of 2014. He subsequently experienced PSA failure and brachytherapy was considered. A CT scan was performed as part of that evaluation revealing a filling defect in the left ureter. In March 2016, he presented to the emergency room with a creatinine of 13.7 and potassium of 8.9. Bilateral nephrostomy tubes were placed. He was found to have GI bleeding and a colonoscopy revealed evidence of radiation proctitis. He has been on Lupron. 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 His initial staging was stage II, [id number].    The original pathology was reviewed prior to radiation. The left base revealed adenocarcinoma the prostate, Ade score 7 (3+4) involving 2 out of 2 cores, 40% of each core, with perineural invasion. The left mid zone revealed adenocarcinoma the prostate, Ade score 7 and (4+3) involving 50% of each of 2 cores, with perineural invasion noted. The left apex had 3 of 3 cores involved. One core was Ade 7 (3+4) involving 100% of the core. The other 2 cores had a Ade 6 (3+3) in less than 5% of 2 cores. Perineural invasion was identified as well.  Posttreatment biopsy revealed the presence of adenocarcinoma within the prostate gland. The PSA was 11 at the time of the biopsy.  Feels well at initial consultation. Started Lupron in January 2016 before the renal failure/obstruction. No pains. Fatigue at times. Appetite good, stable weight. Lost weight with illness in March, typical weight prior was 210, left hospital at 170. Weight stable. He was transfused multiple times. Some urine through penis recently. Has bilateral percutaneous nephrostomies. No blood, dysuria, no incontinence. Nocturia x 2-3 at this time. Right sided nephrostomy still has output, small amount in left bag. Due for change of tubes in September. Had radiation proctitis and has laser treatment. Sees GI in follow-up next month.   10/18/16...Had a colonoscopy, showed radiation colitis. No further rectal blood noted. Urine flow mostly from PCNs, some from penis. No dysuria, some  hematuria noted at times. Appetite is normal. No weight loss. No fatigue. Hot flushes occur daily. .  4/18/17...last scans 9/26.  Saw PCP and had PSA done, shows slight increase over manjinder. he was given ferrous sulfate to take for anemia. No pains. Urine flow is mostly thru PCNs. Still has occasional blood in the urine, noted in the bag. Has less volume form the left side.  Hot flushes occur a few a day, 15-20 minutes, occ sweats. Appetite is good, no weight loss. Some fatigue noted. No edema. Occ blood with stools, saw Dr Grossman in September, due for follow up. has not had major bleeding recently.   8/1/17...Surya states that he is feeling good, his urine flow is strong, slight increase, no pains anywhere, he gets hot flashes a "few  day, they are coming." He has some minor fatigue, He has percutaneous nephrostomies, He follows with Dr. Dick, no obvious hematuria, no breast tenderness.  11/14/17...Received Lupron from Dr Dick a few weeks ago. PSA was 0.02 on 8/1/17. He gets hot flashes a couple times a day, He has b/l nephrostomies, is able to pass urine through his penis. He denies hematuria, dysuria He states appetite is good. He gets some fatigue, He denies any pains.  4/17/18...Received Lupron from Dr Dick Jan 23, 2018. Not seen since November 17, due to illness. 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 Feels well. No pains noted. Some fatigue. Appetite is good. Reports that he has diarrheal for 2-3 months, not daily. He had loose stools this AM, took Imodium. Diarrhea occurs 1-2 days a week, the n stops. Some flatus, no cramps. No blood in the stool. Urine flow is via nephrostomy, minor amount thru penis, no dysuria, no incontinence. No blood in urine. Hot flushes most days.    Minor fatigue. Appetite normal.   7/17/18...On CAB. Feels "okay". No pains. Has bilateral nephrostomy tubes, due for change in August. No recent infections, occasionally passes urine via penis, no blood, no dysuria. Still has hot flushes, daily, more in the winter. Some fatigue, no physical impairment. Appetite is good. Weight stable. Diarrhea persists, on and off, not daily. Watery at times. takes Imodium on occasion. Occ small amount of blood with hard stools, secondary to radiation proctitis.   10/23/18...Feels "okay". No pains. Urine flow is minimal, most comes out of the nephrostomy tubes, nocturia -none. No blood, no dysuria. No incontinence. Hot flushes occur, day and night. Appetite is good, no weight. No dyspepsia, no nausea, no vomiting. Diarrheal stools occur episodically, every 2-3 weeks. No blood in the stool. Usually 1-2 BMs per day. Occasionally notes blood on toiler paper with hard stool. has RT proctitis.   2/5/19...Feels well, no pains. has hot flushes frequently, daily. Has some sweats with them at night. Appetite is good, no weight loss. Urine flow is minimal thru penis, nocturia does not occur. Most urine form PCNs, last catheter change January 2019, by IR at [facility]. Occ minor blood in the urine, when the catheter is loose. No back pains, no bone pains. Occ fatigue.   5/7/19...Had a calcium of 11.1 last visit. Called him and told to stop calcium and vit D. Repeat 3 weeks later was normal. Continues on Lupron and Casodex. Feels well. Hot flushes every day. He has some fatigue, unchanged. Appetite is good, no weight loss. Urine is minimal from penis, has bilateral PCNs. No bone pains. No edema.   8/8/19...Feels "all right", as a URTI. No pains noted. Hot flushes multiple time a day. Fatigue is present, mild. Urine flow is 'good', PCN bilateral, most flow form the right. Some flow thru penis, voids 1-2 times a day. NO blood, no dysuria. Due for PCN change next week. Appetite is good, no weight change.  10/29/19...Local failure prostate cancer, on CAB. Feels well. No pains., Urine flow is good. Has bilateral PCNs. Most of urine goes to PCNs. Occ small amount of blood in the bag. Voids 2 times a day. Hot flushes, daily, with sweats on occasion. has fatigue at times. Appetite is good, no weight loss. NO leg edema. No bleeding from the RT proctitis recently  1/28/20...Stopped bicalutamide after last visit. Last seen 3 months ago. PSA on 1/7 was 0.27, thus continued to rise somewhat. NO bone pains. Hot flushes daily, occ sweats. Appetite is good, no weight loss. No fatigue of note. Urine flow is good, but most flow is into the PCNs. Last change of PCNs was December after he had some leaking on one side. No hematuria, no dysuria. No incontinence.   5/6/20...Verbal permission granted for telephone services by patient, Surya Cervantes, on 5/6/20 at 1:35 PM.   Fells well. No pains noted. Nephrostomy tubes in place, replaced last week. Appetite is good, no weight loss. Hot flushes daily. He has chronic fatigue complaints. No leg edema noted. Urine through penis is minimal, only when tubes become clogged. No blood in the urine. No nausea, no vomiting. Has diarrhea at times, once every 2-3 weeks. No back pains noted. No fevers, no chills., No cough, no sputum.   6/17/20...Verbal permission for telephone contact was granted by the patient, Surya Cervantes, on June 17, at 4 PM. Feels "all right". NO pains. Hot flushes, daily, occ minor sweats. Fatigue is present, rated mild, occasional naps. Appetite is good, no weight loss. No edema. Urine flow is minimal from penis, has bilateral PCNs. No blood. No incontinence. No recent infections. No cough NO CHEN. No F/C. Helps in care of his mother, watches TV, not very active. Jeana from [employer].   7/29/20...Verbal permission for telephone contact was granted by the patient, Surya Cervantes, on July 29, 2020 at 3:10 PM.  Started tawanda/pred on June 16, 2020. Feels  "pretty good". He was having diarrhea prior and he no longer had diarrhea.   No pains at this time. No edema. No headaches. Checks BP on occasion.  BPs have been "normal", he will check often. Urine flow "about the same",. Most urine comes from PCNs. No nocturia, no  hematuria, no dysuria. Appetite is normal, thinks he may have lost a few pounds. No cough, No CHEN. NO F/C. Hot flushes occur daily. No change. Mild fatigue.  8/26/20...On abiraterone and prednisone since June, PSA dropped significantly. taking meds correctly. Feels 'all right", no pains. Fatigue is present, as before, "not terrible", takes naps. Slightly more fatigue than before. Appetite is good, but lost weight. eats with his mother, she eats less and so does he. No nausea, no vomiting. No headaches, no edema of note. Most of urine comes from PCNs, Urine from penis is minimal, does not get up at night. No blood, no dysuria. No incontinence. No blood in the PCN bags.  Has leg cramps daily, particularly in the AM. Dr Angulo administers Lupron, due for Rx on 9/1/20.   9/22/20...He is seen in the office today in follow-up. He's been on abiraterone and prednisone since June. He had elevated transaminases on September 3 and his abiraterone was subsequently held. He will have repeat blood work done today after today's visit. He states that he feels "all right." His appetite is stated to be "all right." His weight has been stable. He denies dyspepsia, nausea or vomiting. He denies constipation or diarrheal stools. There is no cough or shortness of breath. He denies skin rashes or pruritus. There are no complaints of pains at this time. Fatigue is present but it does not impair his daily activities. He takes occasional naps during the day. He denies arthralgias or myalgias. There are no headaches or dizziness. He has bilateral percutaneous nephrostomy tubes draining yellow urine. There is no blood in the percutaneous nephrostomy bags. Most of the urine comes from the percutaneous nephrostomy tubes with minimal amount of urine coming out through the penis. There is no nocturia. There is no hematuria or dysuria. There is no incontinence. There is no edema in the extremities. He has occasional hot flashes but felt it has improved these past few days. He denies fevers or chills. He infrequently checks his blood pressure at home. He would get blood pressure readings ranging from 150s-160s systolic over 80s-90s diastolic. He remains independent in his activities of daily living.   10/27/20...Verbal permission for telephone services granted by the patient,  Surya Cervantes on October 27, 2020 at 3:48 PM.  Stared abiraterone in June. Feels "pretty good". NO increased edema. No headaches. Hot flushes are less. Appetite is good, no weight loss. Has PCNs, has urine form the penis at times. NO blood in the urine. No dysuria. No nocturia. No incontinence. No bone pains noted. Fatigue  RTs, takes a nap at times. Cares for his elderly mother at home.   11/25/20...Verbal permission for telephone services granted by the patient,  Surya Cervantes on November 25, 2020 at 1:50 PM On 1/2 dose tawanda due to transaminases. No pains, feels "all right". Some fatigue. Urine flow form penis in minimal, has bilateral PCNs. Occasional small amount of blood in the right PCN tube. Appetite is good, no weight loss. No cough, no CHEN. Hot flushes daily. No headaches, no edema. BP monitored at home, 151/89, pulse 95.   12/23/20...Verbal permission for telephone services granted by the patient,  Surya Cervantes on December 23, 2020 at  1:32 PM.  Feels well. No pains noted. Urine flow RTS. Has PCNs, minimal urine via penis on occasion. No blood in urine or tubes of note. Appetite is good, no weight loss. NO edema of the legs. NO headaches, no dizziness. No cough, no CHEN. No F/C. Hot flushes occur daily, less often. Due Lupron next month from Dr Angulo. Mild fatigue, take a nap. Independent in ADLs, cares for his mother. BP at home 138/90, pulse 71. Left arm 147/92  1/28/21...Verbal permission for telephone services granted by the patient,  Surya Cervantes on January 28, 2021 at 315 PM.  Surya reports that he has no pains.  There is no significant urine within his blood.  In the interim, he had to have the left percutaneous nephrostomy tube changed as there was pus present.  They had some difficulties apparently.  The tube was clogged and he was unable to change it over a guidewire.  He was not treated with any antibiotics.  He does have some urine from the penis at times.  There is no fevers or chills.  His appetite is good and there is no weight loss.  He reports that his weight is 190 pounds today.  He says his blood pressure was 146/92 in the left arm and 145/85 on the right arm.  The pulse rate was 75.  He reports no edema or headaches.  There are some hot flushes.  2/25/21...Verbal permission for telephone services granted by the patient,  Surya Cervantes on 2/25/21 at 3:10 PM Feels "all right". NO pains. He was having issues with PCNs clogged, had to have removal and reinsertion. It was the right side this week, done on Tuesday. Told to flush it daily rather than every 3 days. Appetite is good, no weight loss, no edema. No cough, no CHEN. BP is monitored, right side 139/84 today, left 134/82, pulse 79. No headaches, no significant fatigue, does nap on occasion. NO leg weakness. No N/V/D/C. Minimal urine from penis. There was more flow when the dysfunction occurred. NO blood in the urine, no dysuria.   4/7/21...  10/26/21....Last evaluation was 4/7/21, by telephone. Completed antibiotics. Was in rehab for 6 weeks. Strength is good, no pains. Appetite is good, gained some weight. urine flow is good, no incontinence, very little urine from penis, mostly from the tubes. No headaches, no dizziness, no balance issues, No falls. Hot flushes occur. Lives with mother, helps her. No edema at this time.   From neurosurgery, this summary of events....70 yo Male w/ PMHx of HTN, prostate CA (on chemo, s/p radiation), b/l nephrostomy tubes, presented  to [facility] ED on 6/14/21 with weakness and confusion. In ED foulurine in b/l nephrostomy bags noted, tachycardic, and hypotensive. s/p IVF and levo drip for septic shock, developed fluid overload with pitting edema and decreased  EF. Imaging of the spine concerning for discitis and osteomyelitis c-spine with T1 inflammation/signal changes.   Hospital coursed remarkable for BC + Klebsiella and strep anguinous. Started on vanco/zosyn 6/14. As per ID switched to ceftriaxone 2 g on 6/17. Hospitalization c/b ASHLEY on CKD with right hydronephrosis. IR consulted- no intervention, both nephrostomy tubes flushing adequately. Nephrology  consulted, started on stress dose steroids/hydrocortisone 50 mg q6 and Midodrine. CTAP showed Sludge in gallbladder.  Patient  underwent C6-7 corpectomy and posterior fusion C4-T2 on 6/29/21. Hospital course c/b tachycardia, fever with low O2 sats. Chest CT negative for  PE, lower extremity Dopplers negative. s/p PICC, discharged to inpatient rehab on 7/12/21 and then was discharged home after completion of rehab.   Last Eligard was August with Dr Angulo, 30 mg dose.   11/30/21...on tawanda/pred since June 2020. Feels  "all right". Has hot flushes and fatigue. Does not prevent activities. He falls asleep easily if he sits in a chair. Independent in ADLs, cares for his mother. No infections recently, No F/C. No recent adventures with with his PCNs. No blood in urine, occ urine via penis. Appetite is good, gained 2.2 kilos. Edema decreased. No chest pain/pressure. No cough/ CHEN. No headaches, no dizziness. No N/V/D/C.   1/11/22....telehealth today. Feels  "all right". No pains of note. t flushes daily. Has some fatigue as well. No edema noted. NO cough/CHEN No chest pain/pressure. No F/C. Appetite is good, no weight loss. Weighs 176. Checks BP at home, 142/98 RP 72 today, L arm, 142/91. Takes terazosin at night. Sees PCP in March. Needs to call PCP for tighter BP control. NO headaches, no dizziness. No F/C. No hematuria, has bilateral PCNS, has some small amount of urine from the penis. Had change of PCNs last week. Independent in ADLs.   2/8/22 - telehealth visit today. Continues on abiraterone 500mg daily + prednisone. Reports his BP today was 143/94 with HR of 74. Overall feeling "alright". Moderate fatigue, takes naps during the day. Occasional hot flashes. Appetite is good, weight today is 180lbs, he attributes recent weight gain to eating better after being discharged from the hospital. Mild ptosis of left eyelid comes and goes and has reportedly been stable for his "whole life". Bilat percutaneous nephrostomy tubes in place, no hematuria. Ongoing mild BLE edema is reportedly stable. Ongoing intermittent arthritis pains of left knee and right shoulder are stable. Denies fever, chills, headache, dizziness, balance issues, eye pain/problems, mucositis/odynophagia, chest pain, palpitations, SOB, cough, nausea/vomiting, diarrhea/constipation, abdominal pain, hematuria, rash/pruritus, neuropathy, bleeding, weakness, anxiety/depression.  3/17/22...tawanda/pred started mid June 2020. PSA was 11.6 at start. Feels "all right". No pains. No major issues with the PCNs, having a change done next week. Appetite is good, no weight loss. No cough, No CHEN. Some leg edema. No chest pain/pressure. Urine from penis is relatively little. No dysuria, no blood. has calcium oxalate excretion, which causes his tubes to be clogged. Notes some bruising. NO headaches, no dizziness, no balance issues. No F/C. No N/V/C, some minor diarrheal stools at times. Takes Imodium as as needed. Fatigue is present at times, has hot flushes occur multiple times a day.   4/20/22 - continues on abiraterone (500mg daily) + prednisone since June 2020 for mCRPC. Overall feeling "alright", ongoing mild fatigue is stable. Intermittent hot flashes are tolerable. Mild night sweats during the summer when it gets warmer. Ongoing mildly productive cough which he's had for "a long time". Occasional diarrhea that doesn't last more than a day. Bilateral percutaneous nephrostomy tubes in place, passing minimal urine from his penis. Occasional hematuria especially from right nephrostomy bag, clear today. Ongoing mild BLE edema. Ongoing chronic left knee swelling and pain 2/2 arthritis. Ongoing right shoulder pain 2/2 arthritis as well. Denies fever, chills, headache, dizziness, balance issues, eye pain/problems, mucositis/odynophagia, chest pain, palpitations, SOB, nausea/vomiting, constipation, abdominal pain, rash/pruritus, bleeding, muscle pain/weakness  5/24/22 - continues on abiraterone (500mg daily) + prednisone since June 2020 for mCRPC. BP today is 164/95, asymptomatic. BP at home has been 150s/80-90s. On occasion the DBP has been >100. Overall feeling "alright", ongoing fatigue is stable. Ongoing intermittent hot flashes. Appetite is good. Vision is changing in his left eye, he has an appt with [facility] this Thurs. Bilateral nephrostomy tubes in place, occasional hematuria at times resolves after 2-3 days. Ongoing trace edema of BLEs. Intermittent pruritus around nephrostomy tube dressings. Denies fever, chills, night sweats, headache, dizziness, balance issues, mucositis/odynophagia, chest pain, palpitations, SOB, cough, nausea/vomiting, diarrhea/constipation, abdominal pain, dysuria, incontinence,  rash, neuropathy, bleeding, muscle or joint pain/weakness.   6/21/22 - continues on abiraterone, half dose + prednisone since June 2020 for mCRPC. PSA 11.6 at start of treatment in June 2020, manjinder PSA at o.o5 August 2021, slow rise, recent PSA in May 2022 was 0.17.  feels  "all right". No pains noted. Has bilateral PCNs, changed last week. No infections, no F/C. Some fatigue, always, no worse. Appetite is good, no weight loss. NO headaches, no dizziness, no balance issues. Some edema noted, as before. No chest pain/pressure. NO bone pains. No N/V/D/C. Small amounts of urine via the penis. Hot flushes daily, multiple times. No cough, no CHEN.   7/28/22 - continues on abiraterone 500mg + prednisone since June 2020 for mCRPC. Overall feeling "alright", ongoing mild fatigue for which he occasionally takes a nap once a day. Ongoing hot flashes are tolerable. Appetite is good. Occasional cough. Bilateral percutaneous nephrostomy tubes in place passing most of the urine through the bags but still passing minimal urine via penis, no nocturia. Trace edema of BLEs. Notes intermittent cramping of hands which may be arthritis as it improves with movement. Has ongoing intermittent right buttock pain radiating down to the knee, present for the past year, pain is worse with sitting for a long time, improves with changing positions, max pain is 3/10 not requiring medication. Denies fever, chills, night sweats, headache, dizziness, balance issues, eye pain/problems, mucositis/odynophagia, chest pain, palpitations, SOB, nausea/vomiting, diarrhea/constipation, abdominal pain, dysuria, hematuria, incontinence, rash/pruritus, bleeding, weakness.   10/11/22 - continues on abiraterone 500mg daily + prednisone since June 2020 for mCRPC. Neurosurgical notes reviewed...."S/P cervical spinal fusion, 70 year old male 15 months pos op C6-7 corpectomy and posterior fusion C4-T2 on 6/29/21 with Dr. Tiffanie Martinez". Now seeing Dr Resendiz. Overall, feels "all right". No pains noted. urine flow is "fine", no incontinence, most urine still out PCNs, some from the penis. No blood, no dysuria. Hot flushes daily.  Some fatigue, RTS. Appetite is good, no weight loss. No headaches, dizziness, no balance issues, No paresthesias. Occ cough, sputum is white. No CHEN. Tubes to be changed next week. No N/V/D/C.  Some edema , improved. No chest pain/pressure  11/9/22 - continues on abiraterone 500mg daily + prednisone since June 2020 for mCRPC which he is tolerating well. Pt reports feeling generally well. No new complaints. Bilateral nephrostomy tubes in place, denies hematuria . Ongoing trace edema of BLEs. Intermittent pruritus around nephrostomy tube dressings. Denies fever, chills, night sweats, headache, dizziness, balance issues, mucositis/odynophagia, chest pain, palpitations, SOB, cough, nausea/vomiting, diarrhea/constipation, abdominal pain, dysuria, incontinence, rash, neuropathy, bleeding, muscle or joint pain/weakness. Appetite reported as good. No changes in medications  12/7/22 - continues on abiraterone 500mg daily + prednisone since June 2020 for mCRPC.  gets his Eligard 45 tomorrow form Dr Angulo. feels "all right". No pains noted except for arthrtic complaints. Appetite is okay< dropped a few pounds. usual edema, no change. No headaches, NO dizziness. Most of the urine comes from PCNs, changed every 6 weeks due to prior infections. NO fevers, no chills. Hot flushes occur, 1-2 times a day. Notes a bit more urine thru penis lately. Occ cough, no CHEN. No chest pain/pressure/palpitations. NO N/V/D/C. Mild fatigue. Independent in ADLs.   1/4/23 - continues on abiraterone 500mg daily + prednisone since June 2020 for mCRPC. Overall feeling "alright", ongoing fatigue is stable. Ongoing hot flashes are tolerable. Appetite is "ok". Ongoing occasional productive cough is stable. Bilateral perc nephrostomy tubes in place, continues to pass some urine through the penis, no nocturia. Denies fever, chills, night sweats,headache, dizziness, balance issues, eye pain/problems, mucositis/odynophagia, chest pain, palpitations, SOB, nausea/vomiting, diarrhea/constipation, abdominal pain, dysuria, hematuria, incontinence, LE edema, rash/pruritus, bleeding, muscle or joint pain/weakness.   2/13/23..... continues on abiraterone 500mg daily + prednisone since June 2020 for mCRPC. treated with cephalexin and developed rash, red skin, pruritus. Chart labeled as allergy. Hospitalization was brief. sudden onset of symptoms, Hd diarrheal stools as well. Feels  "pretty good", except for residual pruritus. No pains noted.  Has tube change set for next week. Appetite has retuned. No N/V. Diarrhea resolved, NO taste alteration. No headaches, no dizziness. had some edema. resolved. No chest pain/pressure/palpations.  has his usual fatigue and hot flushes. Cough, chronic, with clear sputum. No CHEN/SOB. No fevers of chills since discharge  Hospital Course:  Discharge Date	01-Feb-2023  Admission Date	29-Jan-2023 17:52  Reason for Admission	Syncope  Hospital Course	  71 yo M w/ HTN, gout, prostate cancer s/p b/l nephrostomy tubes, on abiraterone, admitted w/ sepsis 2/2 UTI, improved on antibiotics.  Sepsis secondary to UTI.  was febrile, tachycardic in the ED, met criteria for sepsis on admission  - U/A w/ +nitrite, large LE, prior cultures with klebsiella and E. coli  - current Ucx >3 organisms suggestive of collection contamination  - received IV zosyn (1/29/23-2/1/23),  Bcx negative, discharge on oral cephalexin to complete 10 days for complicated UTI (end date 2/7/23)  - sepsis resolved (currently afebrile, without leukocytosis, tachycardia resolved)  - IR consulted nephrostomy tubes in position and draining well, no indication for exchange at this time.   Gastroenteritis.   had reported nausea/vomiting/diarrhea after eating fried rice w/ beef  - s/p IV fluids, now resolved.   Syncope.   reports syncopal episode likely 2/2 dehydration from gastroenteritis  - s/p IV fluids, EKG sinus rhythm, troponin indeterminate but no increased  - reports symptoms resolved, ambulating around unit independently (advised call don't fall).   3/21/23..... continues on abiraterone 500mg daily + prednisone since June 2020 for mCRPC. treated with cephalexin and developed rash, red skin, pruritus. Chart labeled as allergy. Overall, he feels "all right". No pains of note. Appetite is good, no weight loss. No edema. No chest pain/pressure/palpitations. some cough, no CHEN. Hot flushes daily. Fatigue is present.  Does yoga, runs in place. No HA, no dizziness, no falls. Independent in ADLs. no fevers, no chills. Minimal urine from penis, no dysuria. PCNs are changed every 6 weeks, no blood in tubes. No N/V/D/C.   4/17/23.....  on abiraterone 500mg and prednisone since June 2020 for mCRPC. Prior transaminitis led to decrease in dose. "doing okay". Minor discomfort on left side near the PCN. Has change every 6 weeks at this time. Appetite is good, no weight loss. No N/V/D/C. Has some urine form the penis, noted if PCNs pinch. No blood in the urine, no hematuria. Hot flushes as before, about 3 times a day.  fatigue RTS. No cough, no CHEN, No fevers, no chills. No edema, no chest pains,pressure/palpitations  Exercises every AM.   5/16/23....continues on abiraterone 500mg and prednisone since June 2020 for mCRPC. Prior transaminitis led to decrease in dose. Eligard next month with Dr Angulo. Overall, feels "all right". No pains noted. Appetite is good, no weight loss. No N/V/D/C. Urine from penis on occasion, empties bladder before bed. No hematuria, no dysuria. No fevers, no chills. PCNs were changed last week. No headaches, no dizziness, no balance issues.  Exercises daily, walking and running. Occ cough, no CHEN. No chest pain/pressure/palpitations. Hot flushes remains. No fevers, no chills  6/12/23.... on abiraterone 500mg and prednisone since June 2020 for mCRPC. Prior transaminitis led to decrease in dose. Mara with Dr Angulo. Feels "well". NO pains, except for radicular pain in right thigh, present for about 6 weeks, constant. uses a topical, icy-hot. Does not awaken him, better when he is lying down, aggravated by ambulation. Appetite is normal. No weight loss. NO fevers, no chills. NO fatigue. No headaches, no dizziness, no paresthesias. No cane. No falls. No cough, no CHEN. NO N/V/D/C. Urine from PCNs. Some from the penis, no hematuria, no dysuria. No nocturia. Hot flushes daily. No edema. NO chest pain/pressure/palpitations. No back pains. Independent in ADLs  7/18/23 - continues on abiraterone + prednisone (500mg daily d/t transaminitis) for mCRPC since June 2020. Ongoing fatigue after periods activity, naps once a day. Ongoing hot flashes are tolerable. Appetite is good. Occasional cough. Bilateral perc nephrostomy tubes in place, scant blood if the tubing moves around but not persistent. Ongoing pain in right buttock radiating down posterior thigh/leg, no pain with lying down, pain is worse with sitting for a prolonged time and improves with walking, max pain is 8/10. Not taking any pain medication as the pain eases up when he gets up and walks around.   8/16/23 - continues on abiraterone + prednisone (500mg daily d/t transaminitis) for mCRPC since June 2020. Overall feeling "alright", ongoing fatigue is stable. Ongoing frequent hot flashes, tolerable. Appetite is "alright". Ongoing occasional cough. Perc nephrostomy tubes exchanged 2wks ago, occasional transient hematuria which he attributes to accidentally pulling on the tubes. Ongoing stable intermittent right buttock pain radiating down posterior thigh down to the calf, improves with movement and worse with sitting for a long time, max pain is 5/10. Using a topical cream called "[place] Dream" which has been helpful.   9/20/23 - continues on abiraterone + prednisone (500mg daily d/t transaminitis) for mCRPC since June 2020. last PCN change was last week. On a 6 week change protocol. Feels "not bad", chronic leg pains, form right buttock, down the leg. Passing urine via penis, very small amounts. No blood, no dysuria. No nocturia. No incontinence, voids about 2 times a day. Hot flushes, every day, no sweats. Fatigue is present, naps, feels refreshed afterwards. Does yoga, stretching, walking, lifts some weights, daily basis. Appetite is good, no weight Kenji Occ cough, no CHEN. No edema, no chest pain/pressure.   10/31/23 - continues on abiraterone + prednisone (500mg daily d/t transaminitis) for mCRPC since June 2020. Overall feeling "alright", ongoing fatigue is stable. Occasional hot flashes. Appetite is "alright". Perc nephrostomy tubes in place, passing minimal urine through his penis. Ongoing pain in right buttock that radiates down the posterior leg, worse in the morning when first waking up and worse with sitting for a prolonged period, improves/resolves with activity. Denies fever, chills, night sweats, headache, dizziness, balance issues, chest pain, palpitations, SOB, cough, nausea/vomiting, diarrhea/constipation, abdominal pain, dysuria, hematuria, incontinence, LE edema, bleeding.   12/5/23 - continues on abiraterone + prednisone (500mg daily d/t transaminitis) for mCRPC since June 2020. Overall feeling well, some fatigue but remains active and exercising regularly. Ongoing hot flashes are tolerable. Appetite is good. Bilateral percutaneous nephrostomy tubes in place, no hematuria. Ongoing right buttock pain that radiates down the posterior leg, worse in the morning and improves with activity, max pain is 4-5/10, declines referral to PM&R. Denies fever, chills, night sweats, headache, dizziness, balance issues, chest pain, palpitations, SOB, cough, nausea/vomiting, diarrhea/constipation, abdominal pain, LE edema, bleeding.  1/2/24 - on abiraterone + prednisone since June 2020, discontinued 12/26/23 for POD seen on 12/21/23 bone scan. Feeling well, no significant fatigue. Remains active and doing exercises daily. Occasional hot flashes are tolerable. Bilateral percutaneous nephrostomy tubes in place, no hematuria. Ongoing right buttock pain that radiates down the posterior leg, worse in the morning and improves with activity, max pain is 7-8/10, pain is stable. Denies fever, chills, night sweats, headache, dizziness, balance issues, chest pain, palpitations, SOB, cough, nausea/vomiting, diarrhea/constipation, abdominal pain, LE edema, bleeding.   2/14/24 - Xtandi started early Jan 2024 for mCRPC. Over the past week he's been very fatigued. Ongoing hot flashes. Appetite is decreased and not eating as much as he used to, weight is down 11lbs over the past 6wks. Stable cough since before start of Xtandi. Percutaneous nephrostomy tubes in place, no hematuria. Notes some pains in the hands, knees, and ankles which he attributes to arthritis. Ongoing pain in right buttock radiating down posterior leg, pain is worse in the AM when first waking up and improves with activity/walking, pain waxes and wanes, max pain is 7/10 but not taking any medication for this. Denies fever, chills, night sweats, headache, dizziness, balance issues, chest pain, palpitations, SOB, nausea/vomiting, diarrhea/constipation, abdominal pain, LE edema, bleeding.   3/14/24 - continues on Xtandi since early Jan 2024 for mCRPC. Overall feeling ok, fatigue is a little improved which he believes is because his BP hasn't been as low as it was in the past. BP at home has been 120's/70-80's in the mornings. Ongoing hot flashes are not as intense. Appetite is decreased, weight is down 7lbs over the past month, but overall down 18lbs over 2 months. Urine from bilateral percutaneous nephrostomy tubes is "good", no hematuria. Ongoing pain in right buttock down the posterior leg, pain is worse with sitting for a prolonged period, improves with getting up and moving around, not interfering with his sleep, not needing any medication for pain. Denies fever, chills, night sweats, headache, dizziness, balance issues, chest pain, palpitations, SOB, cough, nausea/vomiting, diarrhea/constipation, abdominal pain, LE edema, bleeding.  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 NO AAW benefit. Started tawanda/pred mid June 2020, discontinued 12/26/23 for disease progression. Xtandi started early Jan 2024, decreased to 80mg daily in May 2024 d/t weight loss. Switched to docetazel in 12/2024 due to PSA progression. [de-identified] : 11/14/24: presents for follow up and management of metastatic CRPC on ADT + reduced dose enzalutamide in setting of weight loss and fatigue. His PSA has been noted to be rising over the last several visits from a manjinder of 8.13 to 15.3 at last visit on 10/16/24.   12/2/24: He presents for follow up and cycle 1 of docetaxel 75mg/m2 after his PSA was noted to rise further from 15.3 to 21.4. He has stopped enzalutamide.  He denies any new significant complaints since last visit.   12/23/24: He presents for follow up and cycle 2 of docetaxel 75mg/m2. He notes transient diarrhea and increased fatigue after cycle 1. Also notes alopecia and increased blood pressure when he takes steroid premedication. Denies fever, chills, night sweats, bony pain.  1/13/25:  He presents for follow up and cycle 3 of docetaxel 75mg/m2. He denies any significant change in symptoms. He has intermittent leaking from nephrostomy sites, increased on days of tx.  Notes continued fatigue. Also notes alopecia and increased blood pressure when he takes steroid premedication. Denies fever, chills, night sweats, bony pain.  2/3/25:  He presents for follow up and cycle 4 of docetaxel. He denies any significant change in symptoms.  Notes continued fatigue. Also notes alopecia and increased blood pressure when he takes steroid premedication. Denies fever, chills, night sweats, bony pain.   2/27/25: He presents for follow up and cycle 5 of docetaxel...now dose reduced to 60mg/m2 2/2 cytopenias and fatigue.  He denies any significant change in symptoms.  Notes continued fatigue. BP has been stable. Denies fever, chills, night sweats, bony pain. Last imaging in October. Will repeat after C6. PSA continues to trend downward.   3/20/25: He presents for follow-up and cycle 6 of docetaxel. Does not appreciate a symptomatic difference between the prior dose and his reduced dose he received last cycle. Endorses hot flashes. Denies pain. Nephrostomy tubes functional. Reports occasional blood from R tube. CBC with improvement in Hb since dose reduction. Has been losing weight although endorses good appetite and is "always thirsty".   4/10/25: He presents for follow-up and cycle 7 of docetaxel. Pt dose reduced to 60mg/m2 since C4 2/2 fatigue and cytopenias. Does not appreciate a symptomatic difference between the prior dose and his reduced dose he received last cycle. Endorses occasional hot flashes. Denies pain. Nephrostomy tubes functional. Less leakage since tube exchange. No bleeding. He continues on monthly xgeva. Denies jaw pain or issue with teeth. Pt noted with elevated /84. Pt reports that he did not take terazosin today because his BP was much lower at home. He states that "he only takes his medication when his morning BP is high". Pt asymptomatic. Pt weight noted to be trending downward. Lost 7 lbs since beginning of the year. Appetite reported as good. Weight loss not intentional.   6/5/25: Since last visit Mr. Morales was admitted to the hospital for diverticular abscess formation. He was treated with IV Cipro/Flagyl. Abscess was not amenable to IR drainage. He was managed conservatively. Repeat CT imaging prior to discharge revealed decreased size of abscess. He was discharged home on PO cipro/Flagyl. He reports he is "ok"He is scheduled for Surgical f/u on 6/9. He denies fever/pain. Appetite poor.   6/25/25: Pt seen in follow up today to recent admission to the hospital. Mr Surya Cervantes presented to the ED secondary to syncope while undergoing an IR procedure for nephrostomy tube exchange. While undergoing procedure patient developed severe mid chest pain (has had prior episodes when lying supine), and undergoing bilateral nephrostomy tube exchange.  RRT 6/17 during IR procedure with HR 200s as per IR note.  Cardiology consulted, input appreciated. Tele with sinus tach, occasional PVCs. TTE reviewed by cardiology, no significant structural disease, no further inpatient cardiac workup advised by Cardiology. Suspected vasovagal/hypovolemic etiology of episode. Cardiology not advising BB at this time, low PVC burden. Suspect sinus tach possibly iso potential hypovolemia/poor PO intake. S/p IVF this hospitalization. Tachycardia improved with IVF and encouragement of PO intake. TSH wnl. Infectious workup unremarkable. Pt felt that his symptoms were 2/2 postural hypotension. Today he reports he is feeling "ok". Appetite has improved some. Brother states he has been eating better since he was discharged from hospital. No return of symptoms since d/c  7/3/25: He presents for follow up. Since his last visit he denies any new symptoms.

## 2025-07-05 NOTE — REVIEW OF SYSTEMS
[Fatigue] : fatigue [Joint Pain] : joint pain [Hot Flashes] : hot flashes [Fever] : no fever [Chills] : no chills [Night Sweats] : no night sweats [Chest Pain] : no chest pain [Palpitations] : no palpitations [Lower Ext Edema] : no lower extremity edema [Shortness Of Breath] : no shortness of breath [Abdominal Pain] : no abdominal pain [Cough] : no cough [Vomiting] : no vomiting [Constipation] : no constipation [Dysuria] : no dysuria [Incontinence] : no incontinence [Joint Stiffness] : no joint stiffness [Muscle Pain] : no muscle pain [Muscle Weakness] : no muscle weakness [Dizziness] : no dizziness [Easy Bleeding] : no tendency for easy bleeding [Easy Bruising] : no tendency for easy bruising [de-identified] : occasional [de-identified] : hot flashes

## 2025-07-05 NOTE — HISTORY OF PRESENT ILLNESS
[Disease: _____________________] : Disease: [unfilled] [T: ___] : T[unfilled] [N: ___] : N[unfilled] [M: ___] : M[unfilled] [ECOG Performance Status: 1 - Restricted in physically strenuous activity but ambulatory and able to carry out work of a light or sedentary nature] : Performance Status: 1 - Restricted in physically strenuous activity but ambulatory and able to carry out work of a light or sedentary nature, e.g., light house work, office work [de-identified] : This 66-year-old male was first seen in consultation on July 20, 2016. In August of 2013 his PSA was 4.3. In September 30, 2013 it was 4.9. He underwent a biopsy in March 5, 2014 revealing Ade 7 (3+4) disease. He underwent external beam radiation therapy for a total dose of 8100 cGy in 45 fractions from May 25 and total July 31 of 2014. He subsequently experienced PSA failure and brachytherapy was considered. A CT scan was performed as part of that evaluation revealing a filling defect in the left ureter. In March 2016, he presented to the emergency room with a creatinine of 13.7 and potassium of 8.9. Bilateral nephrostomy tubes were placed. He was found to have GI bleeding and a colonoscopy revealed evidence of radiation proctitis. He has been on Lupron. 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 His initial staging was stage II, [id number].    The original pathology was reviewed prior to radiation. The left base revealed adenocarcinoma the prostate, Ade score 7 (3+4) involving 2 out of 2 cores, 40% of each core, with perineural invasion. The left mid zone revealed adenocarcinoma the prostate, Ade score 7 and (4+3) involving 50% of each of 2 cores, with perineural invasion noted. The left apex had 3 of 3 cores involved. One core was Ade 7 (3+4) involving 100% of the core. The other 2 cores had a Ade 6 (3+3) in less than 5% of 2 cores. Perineural invasion was identified as well.  Posttreatment biopsy revealed the presence of adenocarcinoma within the prostate gland. The PSA was 11 at the time of the biopsy.  Feels well at initial consultation. Started Lupron in January 2016 before the renal failure/obstruction. No pains. Fatigue at times. Appetite good, stable weight. Lost weight with illness in March, typical weight prior was 210, left hospital at 170. Weight stable. He was transfused multiple times. Some urine through penis recently. Has bilateral percutaneous nephrostomies. No blood, dysuria, no incontinence. Nocturia x 2-3 at this time. Right sided nephrostomy still has output, small amount in left bag. Due for change of tubes in September. Had radiation proctitis and has laser treatment. Sees GI in follow-up next month.   10/18/16...Had a colonoscopy, showed radiation colitis. No further rectal blood noted. Urine flow mostly from PCNs, some from penis. No dysuria, some  hematuria noted at times. Appetite is normal. No weight loss. No fatigue. Hot flushes occur daily. .  4/18/17...last scans 9/26.  Saw PCP and had PSA done, shows slight increase over manjinder. he was given ferrous sulfate to take for anemia. No pains. Urine flow is mostly thru PCNs. Still has occasional blood in the urine, noted in the bag. Has less volume form the left side.  Hot flushes occur a few a day, 15-20 minutes, occ sweats. Appetite is good, no weight loss. Some fatigue noted. No edema. Occ blood with stools, saw Dr Grossman in September, due for follow up. has not had major bleeding recently.   8/1/17...Surya states that he is feeling good, his urine flow is strong, slight increase, no pains anywhere, he gets hot flashes a "few  day, they are coming." He has some minor fatigue, He has percutaneous nephrostomies, He follows with Dr. Dick, no obvious hematuria, no breast tenderness.  11/14/17...Received Lupron from Dr Dick a few weeks ago. PSA was 0.02 on 8/1/17. He gets hot flashes a couple times a day, He has b/l nephrostomies, is able to pass urine through his penis. He denies hematuria, dysuria He states appetite is good. He gets some fatigue, He denies any pains.  4/17/18...Received Lupron from Dr Dick Jan 23, 2018. Not seen since November 17, due to illness. 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 Feels well. No pains noted. Some fatigue. Appetite is good. Reports that he has diarrheal for 2-3 months, not daily. He had loose stools this AM, took Imodium. Diarrhea occurs 1-2 days a week, the n stops. Some flatus, no cramps. No blood in the stool. Urine flow is via nephrostomy, minor amount thru penis, no dysuria, no incontinence. No blood in urine. Hot flushes most days.    Minor fatigue. Appetite normal.   7/17/18...On CAB. Feels "okay". No pains. Has bilateral nephrostomy tubes, due for change in August. No recent infections, occasionally passes urine via penis, no blood, no dysuria. Still has hot flushes, daily, more in the winter. Some fatigue, no physical impairment. Appetite is good. Weight stable. Diarrhea persists, on and off, not daily. Watery at times. takes Imodium on occasion. Occ small amount of blood with hard stools, secondary to radiation proctitis.   10/23/18...Feels "okay". No pains. Urine flow is minimal, most comes out of the nephrostomy tubes, nocturia -none. No blood, no dysuria. No incontinence. Hot flushes occur, day and night. Appetite is good, no weight. No dyspepsia, no nausea, no vomiting. Diarrheal stools occur episodically, every 2-3 weeks. No blood in the stool. Usually 1-2 BMs per day. Occasionally notes blood on toiler paper with hard stool. has RT proctitis.   2/5/19...Feels well, no pains. has hot flushes frequently, daily. Has some sweats with them at night. Appetite is good, no weight loss. Urine flow is minimal thru penis, nocturia does not occur. Most urine form PCNs, last catheter change January 2019, by IR at [facility]. Occ minor blood in the urine, when the catheter is loose. No back pains, no bone pains. Occ fatigue.   5/7/19...Had a calcium of 11.1 last visit. Called him and told to stop calcium and vit D. Repeat 3 weeks later was normal. Continues on Lupron and Casodex. Feels well. Hot flushes every day. He has some fatigue, unchanged. Appetite is good, no weight loss. Urine is minimal from penis, has bilateral PCNs. No bone pains. No edema.   8/8/19...Feels "all right", as a URTI. No pains noted. Hot flushes multiple time a day. Fatigue is present, mild. Urine flow is 'good', PCN bilateral, most flow form the right. Some flow thru penis, voids 1-2 times a day. NO blood, no dysuria. Due for PCN change next week. Appetite is good, no weight change.  10/29/19...Local failure prostate cancer, on CAB. Feels well. No pains., Urine flow is good. Has bilateral PCNs. Most of urine goes to PCNs. Occ small amount of blood in the bag. Voids 2 times a day. Hot flushes, daily, with sweats on occasion. has fatigue at times. Appetite is good, no weight loss. NO leg edema. No bleeding from the RT proctitis recently  1/28/20...Stopped bicalutamide after last visit. Last seen 3 months ago. PSA on 1/7 was 0.27, thus continued to rise somewhat. NO bone pains. Hot flushes daily, occ sweats. Appetite is good, no weight loss. No fatigue of note. Urine flow is good, but most flow is into the PCNs. Last change of PCNs was December after he had some leaking on one side. No hematuria, no dysuria. No incontinence.   5/6/20...Verbal permission granted for telephone services by patient, Surya Cervantes, on 5/6/20 at 1:35 PM.   Fells well. No pains noted. Nephrostomy tubes in place, replaced last week. Appetite is good, no weight loss. Hot flushes daily. He has chronic fatigue complaints. No leg edema noted. Urine through penis is minimal, only when tubes become clogged. No blood in the urine. No nausea, no vomiting. Has diarrhea at times, once every 2-3 weeks. No back pains noted. No fevers, no chills., No cough, no sputum.   6/17/20...Verbal permission for telephone contact was granted by the patient, Surya Cervantes, on June 17, at 4 PM. Feels "all right". NO pains. Hot flushes, daily, occ minor sweats. Fatigue is present, rated mild, occasional naps. Appetite is good, no weight loss. No edema. Urine flow is minimal from penis, has bilateral PCNs. No blood. No incontinence. No recent infections. No cough NO CHEN. No F/C. Helps in care of his mother, watches TV, not very active. Jeana from [employer].   7/29/20...Verbal permission for telephone contact was granted by the patient, Surya Cervantes, on July 29, 2020 at 3:10 PM.  Started tawanda/pred on June 16, 2020. Feels  "pretty good". He was having diarrhea prior and he no longer had diarrhea.   No pains at this time. No edema. No headaches. Checks BP on occasion.  BPs have been "normal", he will check often. Urine flow "about the same",. Most urine comes from PCNs. No nocturia, no  hematuria, no dysuria. Appetite is normal, thinks he may have lost a few pounds. No cough, No CHEN. NO F/C. Hot flushes occur daily. No change. Mild fatigue.  8/26/20...On abiraterone and prednisone since June, PSA dropped significantly. taking meds correctly. Feels 'all right", no pains. Fatigue is present, as before, "not terrible", takes naps. Slightly more fatigue than before. Appetite is good, but lost weight. eats with his mother, she eats less and so does he. No nausea, no vomiting. No headaches, no edema of note. Most of urine comes from PCNs, Urine from penis is minimal, does not get up at night. No blood, no dysuria. No incontinence. No blood in the PCN bags.  Has leg cramps daily, particularly in the AM. Dr Angulo administers Lupron, due for Rx on 9/1/20.   9/22/20...He is seen in the office today in follow-up. He's been on abiraterone and prednisone since June. He had elevated transaminases on September 3 and his abiraterone was subsequently held. He will have repeat blood work done today after today's visit. He states that he feels "all right." His appetite is stated to be "all right." His weight has been stable. He denies dyspepsia, nausea or vomiting. He denies constipation or diarrheal stools. There is no cough or shortness of breath. He denies skin rashes or pruritus. There are no complaints of pains at this time. Fatigue is present but it does not impair his daily activities. He takes occasional naps during the day. He denies arthralgias or myalgias. There are no headaches or dizziness. He has bilateral percutaneous nephrostomy tubes draining yellow urine. There is no blood in the percutaneous nephrostomy bags. Most of the urine comes from the percutaneous nephrostomy tubes with minimal amount of urine coming out through the penis. There is no nocturia. There is no hematuria or dysuria. There is no incontinence. There is no edema in the extremities. He has occasional hot flashes but felt it has improved these past few days. He denies fevers or chills. He infrequently checks his blood pressure at home. He would get blood pressure readings ranging from 150s-160s systolic over 80s-90s diastolic. He remains independent in his activities of daily living.   10/27/20...Verbal permission for telephone services granted by the patient,  Surya Cervantes on October 27, 2020 at 3:48 PM.  Stared abiraterone in June. Feels "pretty good". NO increased edema. No headaches. Hot flushes are less. Appetite is good, no weight loss. Has PCNs, has urine form the penis at times. NO blood in the urine. No dysuria. No nocturia. No incontinence. No bone pains noted. Fatigue  RTs, takes a nap at times. Cares for his elderly mother at home.   11/25/20...Verbal permission for telephone services granted by the patient,  Surya Cervantes on November 25, 2020 at 1:50 PM On 1/2 dose tawanda due to transaminases. No pains, feels "all right". Some fatigue. Urine flow form penis in minimal, has bilateral PCNs. Occasional small amount of blood in the right PCN tube. Appetite is good, no weight loss. No cough, no CHEN. Hot flushes daily. No headaches, no edema. BP monitored at home, 151/89, pulse 95.   12/23/20...Verbal permission for telephone services granted by the patient,  Surya Cervantes on December 23, 2020 at  1:32 PM.  Feels well. No pains noted. Urine flow RTS. Has PCNs, minimal urine via penis on occasion. No blood in urine or tubes of note. Appetite is good, no weight loss. NO edema of the legs. NO headaches, no dizziness. No cough, no CHEN. No F/C. Hot flushes occur daily, less often. Due Lupron next month from Dr Angulo. Mild fatigue, take a nap. Independent in ADLs, cares for his mother. BP at home 138/90, pulse 71. Left arm 147/92  1/28/21...Verbal permission for telephone services granted by the patient,  Surya Cervantes on January 28, 2021 at 315 PM.  Surya reports that he has no pains.  There is no significant urine within his blood.  In the interim, he had to have the left percutaneous nephrostomy tube changed as there was pus present.  They had some difficulties apparently.  The tube was clogged and he was unable to change it over a guidewire.  He was not treated with any antibiotics.  He does have some urine from the penis at times.  There is no fevers or chills.  His appetite is good and there is no weight loss.  He reports that his weight is 190 pounds today.  He says his blood pressure was 146/92 in the left arm and 145/85 on the right arm.  The pulse rate was 75.  He reports no edema or headaches.  There are some hot flushes.  2/25/21...Verbal permission for telephone services granted by the patient,  Surya Cervantes on 2/25/21 at 3:10 PM Feels "all right". NO pains. He was having issues with PCNs clogged, had to have removal and reinsertion. It was the right side this week, done on Tuesday. Told to flush it daily rather than every 3 days. Appetite is good, no weight loss, no edema. No cough, no CHEN. BP is monitored, right side 139/84 today, left 134/82, pulse 79. No headaches, no significant fatigue, does nap on occasion. NO leg weakness. No N/V/D/C. Minimal urine from penis. There was more flow when the dysfunction occurred. NO blood in the urine, no dysuria.   4/7/21...  10/26/21....Last evaluation was 4/7/21, by telephone. Completed antibiotics. Was in rehab for 6 weeks. Strength is good, no pains. Appetite is good, gained some weight. urine flow is good, no incontinence, very little urine from penis, mostly from the tubes. No headaches, no dizziness, no balance issues, No falls. Hot flushes occur. Lives with mother, helps her. No edema at this time.   From neurosurgery, this summary of events....70 yo Male w/ PMHx of HTN, prostate CA (on chemo, s/p radiation), b/l nephrostomy tubes, presented  to [facility] ED on 6/14/21 with weakness and confusion. In ED foulurine in b/l nephrostomy bags noted, tachycardic, and hypotensive. s/p IVF and levo drip for septic shock, developed fluid overload with pitting edema and decreased  EF. Imaging of the spine concerning for discitis and osteomyelitis c-spine with T1 inflammation/signal changes.   Hospital coursed remarkable for BC + Klebsiella and strep anguinous. Started on vanco/zosyn 6/14. As per ID switched to ceftriaxone 2 g on 6/17. Hospitalization c/b ASHLEY on CKD with right hydronephrosis. IR consulted- no intervention, both nephrostomy tubes flushing adequately. Nephrology  consulted, started on stress dose steroids/hydrocortisone 50 mg q6 and Midodrine. CTAP showed Sludge in gallbladder.  Patient  underwent C6-7 corpectomy and posterior fusion C4-T2 on 6/29/21. Hospital course c/b tachycardia, fever with low O2 sats. Chest CT negative for  PE, lower extremity Dopplers negative. s/p PICC, discharged to inpatient rehab on 7/12/21 and then was discharged home after completion of rehab.   Last Eligard was August with Dr Angulo, 30 mg dose.   11/30/21...on tawanda/pred since June 2020. Feels  "all right". Has hot flushes and fatigue. Does not prevent activities. He falls asleep easily if he sits in a chair. Independent in ADLs, cares for his mother. No infections recently, No F/C. No recent adventures with with his PCNs. No blood in urine, occ urine via penis. Appetite is good, gained 2.2 kilos. Edema decreased. No chest pain/pressure. No cough/ CHEN. No headaches, no dizziness. No N/V/D/C.   1/11/22....telehealth today. Feels  "all right". No pains of note. t flushes daily. Has some fatigue as well. No edema noted. NO cough/CHEN No chest pain/pressure. No F/C. Appetite is good, no weight loss. Weighs 176. Checks BP at home, 142/98 RP 72 today, L arm, 142/91. Takes terazosin at night. Sees PCP in March. Needs to call PCP for tighter BP control. NO headaches, no dizziness. No F/C. No hematuria, has bilateral PCNS, has some small amount of urine from the penis. Had change of PCNs last week. Independent in ADLs.   2/8/22 - telehealth visit today. Continues on abiraterone 500mg daily + prednisone. Reports his BP today was 143/94 with HR of 74. Overall feeling "alright". Moderate fatigue, takes naps during the day. Occasional hot flashes. Appetite is good, weight today is 180lbs, he attributes recent weight gain to eating better after being discharged from the hospital. Mild ptosis of left eyelid comes and goes and has reportedly been stable for his "whole life". Bilat percutaneous nephrostomy tubes in place, no hematuria. Ongoing mild BLE edema is reportedly stable. Ongoing intermittent arthritis pains of left knee and right shoulder are stable. Denies fever, chills, headache, dizziness, balance issues, eye pain/problems, mucositis/odynophagia, chest pain, palpitations, SOB, cough, nausea/vomiting, diarrhea/constipation, abdominal pain, hematuria, rash/pruritus, neuropathy, bleeding, weakness, anxiety/depression.  3/17/22...tawanda/pred started mid June 2020. PSA was 11.6 at start. Feels "all right". No pains. No major issues with the PCNs, having a change done next week. Appetite is good, no weight loss. No cough, No CHEN. Some leg edema. No chest pain/pressure. Urine from penis is relatively little. No dysuria, no blood. has calcium oxalate excretion, which causes his tubes to be clogged. Notes some bruising. NO headaches, no dizziness, no balance issues. No F/C. No N/V/C, some minor diarrheal stools at times. Takes Imodium as as needed. Fatigue is present at times, has hot flushes occur multiple times a day.   4/20/22 - continues on abiraterone (500mg daily) + prednisone since June 2020 for mCRPC. Overall feeling "alright", ongoing mild fatigue is stable. Intermittent hot flashes are tolerable. Mild night sweats during the summer when it gets warmer. Ongoing mildly productive cough which he's had for "a long time". Occasional diarrhea that doesn't last more than a day. Bilateral percutaneous nephrostomy tubes in place, passing minimal urine from his penis. Occasional hematuria especially from right nephrostomy bag, clear today. Ongoing mild BLE edema. Ongoing chronic left knee swelling and pain 2/2 arthritis. Ongoing right shoulder pain 2/2 arthritis as well. Denies fever, chills, headache, dizziness, balance issues, eye pain/problems, mucositis/odynophagia, chest pain, palpitations, SOB, nausea/vomiting, constipation, abdominal pain, rash/pruritus, bleeding, muscle pain/weakness  5/24/22 - continues on abiraterone (500mg daily) + prednisone since June 2020 for mCRPC. BP today is 164/95, asymptomatic. BP at home has been 150s/80-90s. On occasion the DBP has been >100. Overall feeling "alright", ongoing fatigue is stable. Ongoing intermittent hot flashes. Appetite is good. Vision is changing in his left eye, he has an appt with [facility] this Thurs. Bilateral nephrostomy tubes in place, occasional hematuria at times resolves after 2-3 days. Ongoing trace edema of BLEs. Intermittent pruritus around nephrostomy tube dressings. Denies fever, chills, night sweats, headache, dizziness, balance issues, mucositis/odynophagia, chest pain, palpitations, SOB, cough, nausea/vomiting, diarrhea/constipation, abdominal pain, dysuria, incontinence,  rash, neuropathy, bleeding, muscle or joint pain/weakness.   6/21/22 - continues on abiraterone, half dose + prednisone since June 2020 for mCRPC. PSA 11.6 at start of treatment in June 2020, manjinder PSA at o.o5 August 2021, slow rise, recent PSA in May 2022 was 0.17.  feels  "all right". No pains noted. Has bilateral PCNs, changed last week. No infections, no F/C. Some fatigue, always, no worse. Appetite is good, no weight loss. NO headaches, no dizziness, no balance issues. Some edema noted, as before. No chest pain/pressure. NO bone pains. No N/V/D/C. Small amounts of urine via the penis. Hot flushes daily, multiple times. No cough, no CHEN.   7/28/22 - continues on abiraterone 500mg + prednisone since June 2020 for mCRPC. Overall feeling "alright", ongoing mild fatigue for which he occasionally takes a nap once a day. Ongoing hot flashes are tolerable. Appetite is good. Occasional cough. Bilateral percutaneous nephrostomy tubes in place passing most of the urine through the bags but still passing minimal urine via penis, no nocturia. Trace edema of BLEs. Notes intermittent cramping of hands which may be arthritis as it improves with movement. Has ongoing intermittent right buttock pain radiating down to the knee, present for the past year, pain is worse with sitting for a long time, improves with changing positions, max pain is 3/10 not requiring medication. Denies fever, chills, night sweats, headache, dizziness, balance issues, eye pain/problems, mucositis/odynophagia, chest pain, palpitations, SOB, nausea/vomiting, diarrhea/constipation, abdominal pain, dysuria, hematuria, incontinence, rash/pruritus, bleeding, weakness.   10/11/22 - continues on abiraterone 500mg daily + prednisone since June 2020 for mCRPC. Neurosurgical notes reviewed...."S/P cervical spinal fusion, 70 year old male 15 months pos op C6-7 corpectomy and posterior fusion C4-T2 on 6/29/21 with Dr. Tiffanie Martinez". Now seeing Dr Resendiz. Overall, feels "all right". No pains noted. urine flow is "fine", no incontinence, most urine still out PCNs, some from the penis. No blood, no dysuria. Hot flushes daily.  Some fatigue, RTS. Appetite is good, no weight loss. No headaches, dizziness, no balance issues, No paresthesias. Occ cough, sputum is white. No CHEN. Tubes to be changed next week. No N/V/D/C.  Some edema , improved. No chest pain/pressure  11/9/22 - continues on abiraterone 500mg daily + prednisone since June 2020 for mCRPC which he is tolerating well. Pt reports feeling generally well. No new complaints. Bilateral nephrostomy tubes in place, denies hematuria . Ongoing trace edema of BLEs. Intermittent pruritus around nephrostomy tube dressings. Denies fever, chills, night sweats, headache, dizziness, balance issues, mucositis/odynophagia, chest pain, palpitations, SOB, cough, nausea/vomiting, diarrhea/constipation, abdominal pain, dysuria, incontinence, rash, neuropathy, bleeding, muscle or joint pain/weakness. Appetite reported as good. No changes in medications  12/7/22 - continues on abiraterone 500mg daily + prednisone since June 2020 for mCRPC.  gets his Eligard 45 tomorrow form Dr Angulo. feels "all right". No pains noted except for arthrtic complaints. Appetite is okay< dropped a few pounds. usual edema, no change. No headaches, NO dizziness. Most of the urine comes from PCNs, changed every 6 weeks due to prior infections. NO fevers, no chills. Hot flushes occur, 1-2 times a day. Notes a bit more urine thru penis lately. Occ cough, no CHEN. No chest pain/pressure/palpitations. NO N/V/D/C. Mild fatigue. Independent in ADLs.   1/4/23 - continues on abiraterone 500mg daily + prednisone since June 2020 for mCRPC. Overall feeling "alright", ongoing fatigue is stable. Ongoing hot flashes are tolerable. Appetite is "ok". Ongoing occasional productive cough is stable. Bilateral perc nephrostomy tubes in place, continues to pass some urine through the penis, no nocturia. Denies fever, chills, night sweats,headache, dizziness, balance issues, eye pain/problems, mucositis/odynophagia, chest pain, palpitations, SOB, nausea/vomiting, diarrhea/constipation, abdominal pain, dysuria, hematuria, incontinence, LE edema, rash/pruritus, bleeding, muscle or joint pain/weakness.   2/13/23..... continues on abiraterone 500mg daily + prednisone since June 2020 for mCRPC. treated with cephalexin and developed rash, red skin, pruritus. Chart labeled as allergy. Hospitalization was brief. sudden onset of symptoms, Hd diarrheal stools as well. Feels  "pretty good", except for residual pruritus. No pains noted.  Has tube change set for next week. Appetite has retuned. No N/V. Diarrhea resolved, NO taste alteration. No headaches, no dizziness. had some edema. resolved. No chest pain/pressure/palpations.  has his usual fatigue and hot flushes. Cough, chronic, with clear sputum. No CHEN/SOB. No fevers of chills since discharge  Hospital Course:  Discharge Date	01-Feb-2023  Admission Date	29-Jan-2023 17:52  Reason for Admission	Syncope  Hospital Course	  69 yo M w/ HTN, gout, prostate cancer s/p b/l nephrostomy tubes, on abiraterone, admitted w/ sepsis 2/2 UTI, improved on antibiotics.  Sepsis secondary to UTI.  was febrile, tachycardic in the ED, met criteria for sepsis on admission  - U/A w/ +nitrite, large LE, prior cultures with klebsiella and E. coli  - current Ucx >3 organisms suggestive of collection contamination  - received IV zosyn (1/29/23-2/1/23),  Bcx negative, discharge on oral cephalexin to complete 10 days for complicated UTI (end date 2/7/23)  - sepsis resolved (currently afebrile, without leukocytosis, tachycardia resolved)  - IR consulted nephrostomy tubes in position and draining well, no indication for exchange at this time.   Gastroenteritis.   had reported nausea/vomiting/diarrhea after eating fried rice w/ beef  - s/p IV fluids, now resolved.   Syncope.   reports syncopal episode likely 2/2 dehydration from gastroenteritis  - s/p IV fluids, EKG sinus rhythm, troponin indeterminate but no increased  - reports symptoms resolved, ambulating around unit independently (advised call don't fall).   3/21/23..... continues on abiraterone 500mg daily + prednisone since June 2020 for mCRPC. treated with cephalexin and developed rash, red skin, pruritus. Chart labeled as allergy. Overall, he feels "all right". No pains of note. Appetite is good, no weight loss. No edema. No chest pain/pressure/palpitations. some cough, no CHEN. Hot flushes daily. Fatigue is present.  Does yoga, runs in place. No HA, no dizziness, no falls. Independent in ADLs. no fevers, no chills. Minimal urine from penis, no dysuria. PCNs are changed every 6 weeks, no blood in tubes. No N/V/D/C.   4/17/23.....  on abiraterone 500mg and prednisone since June 2020 for mCRPC. Prior transaminitis led to decrease in dose. "doing okay". Minor discomfort on left side near the PCN. Has change every 6 weeks at this time. Appetite is good, no weight loss. No N/V/D/C. Has some urine form the penis, noted if PCNs pinch. No blood in the urine, no hematuria. Hot flushes as before, about 3 times a day.  fatigue RTS. No cough, no CHEN, No fevers, no chills. No edema, no chest pains,pressure/palpitations  Exercises every AM.   5/16/23....continues on abiraterone 500mg and prednisone since June 2020 for mCRPC. Prior transaminitis led to decrease in dose. Eligard next month with Dr Angulo. Overall, feels "all right". No pains noted. Appetite is good, no weight loss. No N/V/D/C. Urine from penis on occasion, empties bladder before bed. No hematuria, no dysuria. No fevers, no chills. PCNs were changed last week. No headaches, no dizziness, no balance issues.  Exercises daily, walking and running. Occ cough, no CHEN. No chest pain/pressure/palpitations. Hot flushes remains. No fevers, no chills  6/12/23.... on abiraterone 500mg and prednisone since June 2020 for mCRPC. Prior transaminitis led to decrease in dose. Mara with Dr Angulo. Feels "well". NO pains, except for radicular pain in right thigh, present for about 6 weeks, constant. uses a topical, icy-hot. Does not awaken him, better when he is lying down, aggravated by ambulation. Appetite is normal. No weight loss. NO fevers, no chills. NO fatigue. No headaches, no dizziness, no paresthesias. No cane. No falls. No cough, no CHEN. NO N/V/D/C. Urine from PCNs. Some from the penis, no hematuria, no dysuria. No nocturia. Hot flushes daily. No edema. NO chest pain/pressure/palpitations. No back pains. Independent in ADLs  7/18/23 - continues on abiraterone + prednisone (500mg daily d/t transaminitis) for mCRPC since June 2020. Ongoing fatigue after periods activity, naps once a day. Ongoing hot flashes are tolerable. Appetite is good. Occasional cough. Bilateral perc nephrostomy tubes in place, scant blood if the tubing moves around but not persistent. Ongoing pain in right buttock radiating down posterior thigh/leg, no pain with lying down, pain is worse with sitting for a prolonged time and improves with walking, max pain is 8/10. Not taking any pain medication as the pain eases up when he gets up and walks around.   8/16/23 - continues on abiraterone + prednisone (500mg daily d/t transaminitis) for mCRPC since June 2020. Overall feeling "alright", ongoing fatigue is stable. Ongoing frequent hot flashes, tolerable. Appetite is "alright". Ongoing occasional cough. Perc nephrostomy tubes exchanged 2wks ago, occasional transient hematuria which he attributes to accidentally pulling on the tubes. Ongoing stable intermittent right buttock pain radiating down posterior thigh down to the calf, improves with movement and worse with sitting for a long time, max pain is 5/10. Using a topical cream called "[place] Dream" which has been helpful.   9/20/23 - continues on abiraterone + prednisone (500mg daily d/t transaminitis) for mCRPC since June 2020. last PCN change was last week. On a 6 week change protocol. Feels "not bad", chronic leg pains, form right buttock, down the leg. Passing urine via penis, very small amounts. No blood, no dysuria. No nocturia. No incontinence, voids about 2 times a day. Hot flushes, every day, no sweats. Fatigue is present, naps, feels refreshed afterwards. Does yoga, stretching, walking, lifts some weights, daily basis. Appetite is good, no weight Kenji Occ cough, no CHEN. No edema, no chest pain/pressure.   10/31/23 - continues on abiraterone + prednisone (500mg daily d/t transaminitis) for mCRPC since June 2020. Overall feeling "alright", ongoing fatigue is stable. Occasional hot flashes. Appetite is "alright". Perc nephrostomy tubes in place, passing minimal urine through his penis. Ongoing pain in right buttock that radiates down the posterior leg, worse in the morning when first waking up and worse with sitting for a prolonged period, improves/resolves with activity. Denies fever, chills, night sweats, headache, dizziness, balance issues, chest pain, palpitations, SOB, cough, nausea/vomiting, diarrhea/constipation, abdominal pain, dysuria, hematuria, incontinence, LE edema, bleeding.   12/5/23 - continues on abiraterone + prednisone (500mg daily d/t transaminitis) for mCRPC since June 2020. Overall feeling well, some fatigue but remains active and exercising regularly. Ongoing hot flashes are tolerable. Appetite is good. Bilateral percutaneous nephrostomy tubes in place, no hematuria. Ongoing right buttock pain that radiates down the posterior leg, worse in the morning and improves with activity, max pain is 4-5/10, declines referral to PM&R. Denies fever, chills, night sweats, headache, dizziness, balance issues, chest pain, palpitations, SOB, cough, nausea/vomiting, diarrhea/constipation, abdominal pain, LE edema, bleeding.  1/2/24 - on abiraterone + prednisone since June 2020, discontinued 12/26/23 for POD seen on 12/21/23 bone scan. Feeling well, no significant fatigue. Remains active and doing exercises daily. Occasional hot flashes are tolerable. Bilateral percutaneous nephrostomy tubes in place, no hematuria. Ongoing right buttock pain that radiates down the posterior leg, worse in the morning and improves with activity, max pain is 7-8/10, pain is stable. Denies fever, chills, night sweats, headache, dizziness, balance issues, chest pain, palpitations, SOB, cough, nausea/vomiting, diarrhea/constipation, abdominal pain, LE edema, bleeding.   2/14/24 - Xtandi started early Jan 2024 for mCRPC. Over the past week he's been very fatigued. Ongoing hot flashes. Appetite is decreased and not eating as much as he used to, weight is down 11lbs over the past 6wks. Stable cough since before start of Xtandi. Percutaneous nephrostomy tubes in place, no hematuria. Notes some pains in the hands, knees, and ankles which he attributes to arthritis. Ongoing pain in right buttock radiating down posterior leg, pain is worse in the AM when first waking up and improves with activity/walking, pain waxes and wanes, max pain is 7/10 but not taking any medication for this. Denies fever, chills, night sweats, headache, dizziness, balance issues, chest pain, palpitations, SOB, nausea/vomiting, diarrhea/constipation, abdominal pain, LE edema, bleeding.   3/14/24 - continues on Xtandi since early Jan 2024 for mCRPC. Overall feeling ok, fatigue is a little improved which he believes is because his BP hasn't been as low as it was in the past. BP at home has been 120's/70-80's in the mornings. Ongoing hot flashes are not as intense. Appetite is decreased, weight is down 7lbs over the past month, but overall down 18lbs over 2 months. Urine from bilateral percutaneous nephrostomy tubes is "good", no hematuria. Ongoing pain in right buttock down the posterior leg, pain is worse with sitting for a prolonged period, improves with getting up and moving around, not interfering with his sleep, not needing any medication for pain. Denies fever, chills, night sweats, headache, dizziness, balance issues, chest pain, palpitations, SOB, cough, nausea/vomiting, diarrhea/constipation, abdominal pain, LE edema, bleeding.  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 NO AAW benefit. Started tawanda/pred mid June 2020, discontinued 12/26/23 for disease progression. Xtandi started early Jan 2024, decreased to 80mg daily in May 2024 d/t weight loss. Switched to docetazel in 12/2024 due to PSA progression. [de-identified] : 11/14/24: presents for follow up and management of metastatic CRPC on ADT + reduced dose enzalutamide in setting of weight loss and fatigue. His PSA has been noted to be rising over the last several visits from a manjinder of 8.13 to 15.3 at last visit on 10/16/24.   12/2/24: He presents for follow up and cycle 1 of docetaxel 75mg/m2 after his PSA was noted to rise further from 15.3 to 21.4. He has stopped enzalutamide.  He denies any new significant complaints since last visit.   12/23/24: He presents for follow up and cycle 2 of docetaxel 75mg/m2. He notes transient diarrhea and increased fatigue after cycle 1. Also notes alopecia and increased blood pressure when he takes steroid premedication. Denies fever, chills, night sweats, bony pain.  1/13/25:  He presents for follow up and cycle 3 of docetaxel 75mg/m2. He denies any significant change in symptoms. He has intermittent leaking from nephrostomy sites, increased on days of tx.  Notes continued fatigue. Also notes alopecia and increased blood pressure when he takes steroid premedication. Denies fever, chills, night sweats, bony pain.  2/3/25:  He presents for follow up and cycle 4 of docetaxel. He denies any significant change in symptoms.  Notes continued fatigue. Also notes alopecia and increased blood pressure when he takes steroid premedication. Denies fever, chills, night sweats, bony pain.   2/27/25: He presents for follow up and cycle 5 of docetaxel...now dose reduced to 60mg/m2 2/2 cytopenias and fatigue.  He denies any significant change in symptoms.  Notes continued fatigue. BP has been stable. Denies fever, chills, night sweats, bony pain. Last imaging in October. Will repeat after C6. PSA continues to trend downward.   3/20/25: He presents for follow-up and cycle 6 of docetaxel. Does not appreciate a symptomatic difference between the prior dose and his reduced dose he received last cycle. Endorses hot flashes. Denies pain. Nephrostomy tubes functional. Reports occasional blood from R tube. CBC with improvement in Hb since dose reduction. Has been losing weight although endorses good appetite and is "always thirsty".   4/10/25: He presents for follow-up and cycle 7 of docetaxel. Pt dose reduced to 60mg/m2 since C4 2/2 fatigue and cytopenias. Does not appreciate a symptomatic difference between the prior dose and his reduced dose he received last cycle. Endorses occasional hot flashes. Denies pain. Nephrostomy tubes functional. Less leakage since tube exchange. No bleeding. He continues on monthly xgeva. Denies jaw pain or issue with teeth. Pt noted with elevated /84. Pt reports that he did not take terazosin today because his BP was much lower at home. He states that "he only takes his medication when his morning BP is high". Pt asymptomatic. Pt weight noted to be trending downward. Lost 7 lbs since beginning of the year. Appetite reported as good. Weight loss not intentional.   6/5/25: Since last visit Mr. Morales was admitted to the hospital for diverticular abscess formation. He was treated with IV Cipro/Flagyl. Abscess was not amenable to IR drainage. He was managed conservatively. Repeat CT imaging prior to discharge revealed decreased size of abscess. He was discharged home on PO cipro/Flagyl. He reports he is "ok"He is scheduled for Surgical f/u on 6/9. He denies fever/pain. Appetite poor.   6/25/25: Pt seen in follow up today to recent admission to the hospital. Mr Surya Cervantes presented to the ED secondary to syncope while undergoing an IR procedure for nephrostomy tube exchange. While undergoing procedure patient developed severe mid chest pain (has had prior episodes when lying supine), and undergoing bilateral nephrostomy tube exchange.  RRT 6/17 during IR procedure with HR 200s as per IR note.  Cardiology consulted, input appreciated. Tele with sinus tach, occasional PVCs. TTE reviewed by cardiology, no significant structural disease, no further inpatient cardiac workup advised by Cardiology. Suspected vasovagal/hypovolemic etiology of episode. Cardiology not advising BB at this time, low PVC burden. Suspect sinus tach possibly iso potential hypovolemia/poor PO intake. S/p IVF this hospitalization. Tachycardia improved with IVF and encouragement of PO intake. TSH wnl. Infectious workup unremarkable. Pt felt that his symptoms were 2/2 postural hypotension. Today he reports he is feeling "ok". Appetite has improved some. Brother states he has been eating better since he was discharged from hospital. No return of symptoms since d/c  7/3/25: He presents for follow up. Since his last visit he denies any new symptoms.

## 2025-07-05 NOTE — ASSESSMENT
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 He started abiraterone + prednisone in June 2020, dose was reduced to 500mg d/t transaminitis. Abiraterone discontinued late December 2023 for disease progression. Xtandi started Jan 2024.  He experienced transient benefit but appears to have progressing disease at this time as manifested by steadily rising PSA since 6/2024 and new lesions noted on CT imaging performed 10/30/24.   We discussed these results and that he will need an alternative therapy to control his disease. Standard of care options include taxane chemotherapy and Pluvicto. Given further increase in PSA to 21.4, we discussed the potential risks and benefits associated with docetaxel. He was amenable and signed informed consent.   He tolerated docetaxel with dose reduction to 60mg/m2. Recent course complicated by suspected diverticulitis causing intrabdominal fluid collection. His PSA appears to be gradually increasing. Hgb noted to be decreased at visit last week.   We will plan to proceed with docetaxel next week with close monitoring of counts and PSA. Assuming PSA continues to rise we discussed potential alternatives including cabazitaxel and Pluvicto. We will tentatively plan to transition to cabazitaxel.   mCRPC: POD on tawanda/pred ->enzalutamide.  Now on docetaxel. Dose reduced to 60 mg/m2 since C4 2/2 fatigue/cytopenias - Feb 2024 [employer] Liquid CDx was negative for any actionable mutations, MSI high not detected, TMB 8 muts/Mb, ctDNA <1%.  -Continue Eligard q6mo injection with urology, next due 12/2025 -proceed with C9 docetaxel, delayed by 1 week to 7/10/25 -tentatively plan to continue therapy with potential switch to cabazitaxel in August  #Syncope post procedure -w/u during admission negative. Treated for hypotension/ dehydration/ tachycardia.  -Patient scheduled for cardiology f/u  #diverticulat Abscess -S/P hospitalization. Treated with IV Cipro/Flagyl. Abscess not amenable to IR drainage. Pt discharged home on PO formulation which he has completed.  -Surgical follow up scheduled pending with Dr. West. Pt appointment delayed 2/2 insurance issues. No fevers since completion of antibiotics.   Bones: - He is off Ca + D supplementation d/t hypercalcemia.  - Monthly Xgeva inj started 5/15/24, continue  Cytopenia/anemia -Likely treatment related. No bleeding. Monitor hgb. -Docetaxel, dose reduced to 60mg/m2 C4  Instructed to contact our office with any new/worsening symptoms. Pt and family member educated regarding plan of care, all questions/concerns addressed to the best of my abilities and their apparent satisfaction.  RTC next week for treatment   [AdvancecareDate] : 1/2/24 [FreeTextEntry2] : Health care proxy form provided to pt 12/5/23, he still has at home. Instructed once again to bring the completed form to his next office visit.

## 2025-07-05 NOTE — REVIEW OF SYSTEMS
[Fatigue] : fatigue [Joint Pain] : joint pain [Hot Flashes] : hot flashes [Fever] : no fever [Chills] : no chills [Night Sweats] : no night sweats [Chest Pain] : no chest pain [Palpitations] : no palpitations [Lower Ext Edema] : no lower extremity edema [Shortness Of Breath] : no shortness of breath [Cough] : no cough [Abdominal Pain] : no abdominal pain [Vomiting] : no vomiting [Constipation] : no constipation [Dysuria] : no dysuria [Incontinence] : no incontinence [Joint Stiffness] : no joint stiffness [Muscle Pain] : no muscle pain [Muscle Weakness] : no muscle weakness [Dizziness] : no dizziness [Easy Bleeding] : no tendency for easy bleeding [Easy Bruising] : no tendency for easy bruising [de-identified] : occasional [de-identified] : hot flashes

## 2025-07-07 NOTE — PHYSICAL EXAM
[No HSM] : no hepatosplenomegaly [No Rash or Lesion] : No rash or lesion [Alert] : alert [Oriented to Person] : oriented to person [Oriented to Place] : oriented to place [Oriented to Time] : oriented to time [Calm] : calm [Abdomen Masses] : No abdominal masses [Abdomen Tenderness] : ~T No ~M abdominal tenderness [Wheezing] : no wheezing was heard [de-identified] : well-appearing, awake, alert and oriented x4

## 2025-07-07 NOTE — HISTORY OF PRESENT ILLNESS
[FreeTextEntry1] : Mr. Cervantes presented for follow-up.  He is currently doing fine.  He denies abdominal pain.  He was recently admitted to the hospital for malnutrition and he is currently taking nutritional supplements.  He is tolerating a low fiber diet without any problems.  He is passing gas and stool.  He denies rectal bleeding.  He is in the process of changing his treatment from metastatic prostate cancer.

## 2025-07-07 NOTE — ASSESSMENT
[FreeTextEntry1] : Mr. Adrien james presented to the office for follow-up after his hospital admission for incidentally found diverticular abscess.  He was not treated with antibiotics and the abscess got smaller.  He was never symptomatic from the abscess.  He has metastatic prostate cancer and bilateral nephrostomies.  He is currently in the process of changing his medication as the current one is losing its efficacy.  We had a long discussion about the results and treatment options for diverticulitis.  Given that he was never symptomatic in the first place and he has a lot going on at this point with his treatment for prostate cancer, I recommended watching it for now.  We discussed about the symptoms that he should look out for and come back.  These include sharp pain in the left lower quadrant, diffuse abdominal pain, and difficulty with bowel movements, distention, nausea vomiting.  I told him to come to the ED if any of the symptoms or care, otherwise we will keep actively watching him.  He understands and agrees with the plan.  I spent 30 minutes with the patient and his brother discussing the plan of care.  All questions were answered.